# Patient Record
Sex: FEMALE | Race: WHITE | Employment: PART TIME | URBAN - METROPOLITAN AREA
[De-identification: names, ages, dates, MRNs, and addresses within clinical notes are randomized per-mention and may not be internally consistent; named-entity substitution may affect disease eponyms.]

---

## 2017-01-17 ENCOUNTER — HOSPITAL ENCOUNTER (EMERGENCY)
Facility: HOSPITAL | Age: 37
Discharge: HOME | End: 2017-01-17
Attending: EMERGENCY MEDICINE
Payer: COMMERCIAL

## 2017-01-17 ENCOUNTER — HOSPITAL ENCOUNTER (OUTPATIENT)
Age: 37
Discharge: HOME | End: 2017-01-17
Attending: EMERGENCY MEDICINE
Payer: COMMERCIAL

## 2017-01-17 ENCOUNTER — APPOINTMENT (OUTPATIENT)
Dept: ULTRASOUND IMAGING | Facility: HOSPITAL | Age: 37
End: 2017-01-17
Attending: EMERGENCY MEDICINE
Payer: COMMERCIAL

## 2017-01-17 ENCOUNTER — APPOINTMENT (OUTPATIENT)
Dept: CT IMAGING | Facility: HOSPITAL | Age: 37
End: 2017-01-17
Attending: EMERGENCY MEDICINE
Payer: COMMERCIAL

## 2017-01-17 VITALS
HEIGHT: 68 IN | HEART RATE: 84 BPM | DIASTOLIC BLOOD PRESSURE: 58 MMHG | TEMPERATURE: 99 F | OXYGEN SATURATION: 97 % | WEIGHT: 120 LBS | SYSTOLIC BLOOD PRESSURE: 103 MMHG | RESPIRATION RATE: 18 BRPM | BODY MASS INDEX: 18.19 KG/M2

## 2017-01-17 VITALS
HEART RATE: 107 BPM | RESPIRATION RATE: 20 BRPM | SYSTOLIC BLOOD PRESSURE: 112 MMHG | BODY MASS INDEX: 18.19 KG/M2 | WEIGHT: 120 LBS | DIASTOLIC BLOOD PRESSURE: 69 MMHG | OXYGEN SATURATION: 98 % | TEMPERATURE: 98 F | HEIGHT: 68 IN

## 2017-01-17 DIAGNOSIS — R10.9 ABDOMINAL PAIN, ACUTE: Primary | ICD-10-CM

## 2017-01-17 DIAGNOSIS — N30.90 CYSTITIS: ICD-10-CM

## 2017-01-17 DIAGNOSIS — R10.9 ABDOMINAL PAIN OF UNKNOWN ETIOLOGY: Primary | ICD-10-CM

## 2017-01-17 LAB
ALBUMIN SERPL BCP-MCNC: 3.9 G/DL (ref 3.5–4.8)
ALP SERPL-CCNC: 42 U/L (ref 32–100)
ALT SERPL-CCNC: 26 U/L (ref 14–54)
ANION GAP SERPL CALC-SCNC: 10 MMOL/L (ref 0–18)
AST SERPL-CCNC: 24 U/L (ref 15–41)
B-HCG UR QL: NEGATIVE
B-HCG UR QL: NEGATIVE
BASOPHILS # BLD: 0 K/UL (ref 0–0.2)
BASOPHILS NFR BLD: 0 %
BILIRUB DIRECT SERPL-MCNC: 0.2 MG/DL (ref 0–0.2)
BILIRUB SERPL-MCNC: 1 MG/DL (ref 0.3–1.2)
BILIRUB UR QL: NEGATIVE
BUN SERPL-MCNC: 7 MG/DL (ref 8–20)
BUN/CREAT SERPL: 10.3 (ref 10–20)
CALCIUM SERPL-MCNC: 9.2 MG/DL (ref 8.5–10.5)
CHLORIDE SERPL-SCNC: 103 MMOL/L (ref 95–110)
CO2 SERPL-SCNC: 24 MMOL/L (ref 22–32)
COLOR UR: YELLOW
CREAT SERPL-MCNC: 0.68 MG/DL (ref 0.5–1.5)
EOSINOPHIL # BLD: 0.1 K/UL (ref 0–0.7)
EOSINOPHIL NFR BLD: 1 %
ERYTHROCYTE [DISTWIDTH] IN BLOOD BY AUTOMATED COUNT: 12.3 % (ref 11–15)
GLUCOSE SERPL-MCNC: 98 MG/DL (ref 70–99)
GLUCOSE UR-MCNC: NEGATIVE MG/DL
HCT VFR BLD AUTO: 41 % (ref 35–48)
HGB BLD-MCNC: 13.8 G/DL (ref 12–16)
KETONES UR-MCNC: NEGATIVE MG/DL
LIPASE SERPL-CCNC: 23 U/L (ref 22–51)
LYMPHOCYTES # BLD: 1.4 K/UL (ref 1–4)
LYMPHOCYTES NFR BLD: 20 %
MCH RBC QN AUTO: 30 PG (ref 27–32)
MCHC RBC AUTO-ENTMCNC: 33.6 G/DL (ref 32–37)
MCV RBC AUTO: 89.3 FL (ref 80–100)
MONOCYTES # BLD: 0.7 K/UL (ref 0–1)
MONOCYTES NFR BLD: 10 %
NEUTROPHILS # BLD AUTO: 4.9 K/UL (ref 1.8–7.7)
NEUTROPHILS NFR BLD: 69 %
NITRITE UR QL STRIP.AUTO: POSITIVE
OSMOLALITY UR CALC.SUM OF ELEC: 282 MOSM/KG (ref 275–295)
PH UR: 6 [PH] (ref 5–8)
PLATELET # BLD AUTO: 141 K/UL (ref 140–400)
PMV BLD AUTO: 9.3 FL (ref 7.4–10.3)
POTASSIUM SERPL-SCNC: 3.3 MMOL/L (ref 3.3–5.1)
PROT SERPL-MCNC: 7.3 G/DL (ref 5.9–8.4)
PROT UR-MCNC: NEGATIVE MG/DL
RBC # BLD AUTO: 4.59 M/UL (ref 3.7–5.4)
RBC #/AREA URNS AUTO: 4 /HPF
SODIUM SERPL-SCNC: 137 MMOL/L (ref 136–144)
SP GR UR STRIP: 1.02 (ref 1–1.03)
URINE BILIRUBIN: NEGATIVE
URINE COLOR: YELLOW
URINE GLUCOSE: NEGATIVE MG/DL
URINE KETONES: NEGATIVE MG/DL
URINE NITRITE: POSITIVE
URINE PH: 5
URINE SPECIFIC GRAVITY: 1.02
URINE UROBILINOGEN: 1 MG/DL
UROBILINOGEN UR STRIP-ACNC: <2
VIT C UR-MCNC: NEGATIVE MG/DL
WBC # BLD AUTO: 7.1 K/UL (ref 4–11)
WBC #/AREA URNS AUTO: 7 /HPF

## 2017-01-17 PROCEDURE — 99285 EMERGENCY DEPT VISIT HI MDM: CPT

## 2017-01-17 PROCEDURE — 87086 URINE CULTURE/COLONY COUNT: CPT

## 2017-01-17 PROCEDURE — 81025 URINE PREGNANCY TEST: CPT

## 2017-01-17 PROCEDURE — 81002 URINALYSIS NONAUTO W/O SCOPE: CPT

## 2017-01-17 PROCEDURE — 76705 ECHO EXAM OF ABDOMEN: CPT

## 2017-01-17 PROCEDURE — 85025 COMPLETE CBC W/AUTO DIFF WBC: CPT

## 2017-01-17 PROCEDURE — 83690 ASSAY OF LIPASE: CPT | Performed by: EMERGENCY MEDICINE

## 2017-01-17 PROCEDURE — 96374 THER/PROPH/DIAG INJ IV PUSH: CPT

## 2017-01-17 PROCEDURE — 81001 URINALYSIS AUTO W/SCOPE: CPT

## 2017-01-17 PROCEDURE — 99215 OFFICE O/P EST HI 40 MIN: CPT

## 2017-01-17 PROCEDURE — 74176 CT ABD & PELVIS W/O CONTRAST: CPT

## 2017-01-17 PROCEDURE — 99212 OFFICE O/P EST SF 10 MIN: CPT

## 2017-01-17 PROCEDURE — 80048 BASIC METABOLIC PNL TOTAL CA: CPT

## 2017-01-17 PROCEDURE — 96361 HYDRATE IV INFUSION ADD-ON: CPT

## 2017-01-17 PROCEDURE — 80076 HEPATIC FUNCTION PANEL: CPT | Performed by: EMERGENCY MEDICINE

## 2017-01-17 PROCEDURE — 96376 TX/PRO/DX INJ SAME DRUG ADON: CPT

## 2017-01-17 RX ORDER — CEPHALEXIN 500 MG/1
500 CAPSULE ORAL 3 TIMES DAILY
Qty: 21 CAPSULE | Refills: 0 | Status: SHIPPED | OUTPATIENT
Start: 2017-01-17 | End: 2017-01-24

## 2017-01-17 RX ORDER — CIPROFLOXACIN 500 MG/1
500 TABLET, FILM COATED ORAL 2 TIMES DAILY
Qty: 20 TABLET | Refills: 0 | Status: SHIPPED | OUTPATIENT
Start: 2017-01-17 | End: 2017-01-27

## 2017-01-17 RX ORDER — HYDROCODONE BITARTRATE AND ACETAMINOPHEN 7.5; 325 MG/1; MG/1
1 TABLET ORAL EVERY 4 HOURS PRN
Qty: 20 TABLET | Refills: 0 | Status: SHIPPED | OUTPATIENT
Start: 2017-01-17 | End: 2017-01-24

## 2017-01-17 RX ORDER — HYDROMORPHONE HYDROCHLORIDE 1 MG/ML
0.5 INJECTION, SOLUTION INTRAMUSCULAR; INTRAVENOUS; SUBCUTANEOUS ONCE
Status: COMPLETED | OUTPATIENT
Start: 2017-01-17 | End: 2017-01-17

## 2017-01-17 NOTE — ED NOTES
Progressive RUQ pain that radiates posteriorally. Hemodynamically stable. Denies N/V/D.  Denies urinary symptoms

## 2017-01-17 NOTE — ED PROVIDER NOTES
Patient Seen in: [facility] Immediate Care In [address] [place] [address]    History   Patient presents with:  Abdomen/Flank Pain (GI/)    Stated Complaint: right flank pain    HPI    Patient complains of r sided flank and  abdominal pain that began 2 days ago.   P Apply Externally Cream,  Use bid to affected areas of skin       Family History   Problem Relation Age of Onset   • Hypertension Father    • Kidney Disease Father      per [employer]:  \"Kidney disease - Scarlet Fever\"   • Thyroid Disorder Mother    • Colon agitation    DIFFERENTIAL DIAGNOSIS: After history and physical exam differential diagnosis was considered for  Biliary colic, pud vs. Pyelo less likely appendicitis          ED Course     Labs Reviewed   [facility] POCT URINALYSIS DIPSTICK MANUAL - Abnormal; Not

## 2017-01-17 NOTE — ED PROVIDER NOTES
Patient Seen in: [facility] AND [facility] Emergency Department    History   Patient presents with:  Abdomen/Flank Pain (GI/)    Stated Complaint: SOB/RUQ/R FLANK PAIN    HPI    Patient presents the emergency department today complaining of right flank and rig Take 3,000 Units by mouth daily. B Complex-C-Folic Acid (HM VITAMIN B COMPLEX/VITAMIN C) Oral Tab,  Take 1 tablet by mouth daily. Betamethasone Dipropionate (DIPROSONE) 0.05 % Apply Externally Ointment,  Apply  topically 2 (two) times daily.    Multiple well-nourished. No distress. Uncomfortable but in no distress. Declines pain medicine at this time   HENT:   Head: Normocephalic. Eyes: Conjunctivae and EOM are normal.   Neck: Normal range of motion. Neck supple.    Cardiovascular: Normal rate and reg [facility]     CT scan shows no acute finding.  US GB endorsed to Dr. Felicia Rdz,       Disposition and Plan     Clinical Impression:  Abdominal pain of unknown etiology  (primary encounter diagnosis)    Disposition:  Discharge    Follow-up:  Amna Cordoba MD  172

## 2017-01-20 ENCOUNTER — TELEPHONE (OUTPATIENT)
Dept: FAMILY MEDICINE CLINIC | Facility: CLINIC | Age: 37
End: 2017-01-20

## 2017-01-20 NOTE — TELEPHONE ENCOUNTER
Pt called in stating she did a bunch of lab work, an ultrasound and a cat scan at the ER on 1/17 but was never given any results.   Pt has a follow up appt with Dr. Carey Angeles on Monday, but is wondering if Dr. Carey Angeles can have those results ready to relay back to her d

## 2017-01-20 NOTE — TELEPHONE ENCOUNTER
Dr. Lacie De La Cruz,   Please see message below for details about patient test she had at E.   Thanks

## 2017-01-23 ENCOUNTER — LAB ENCOUNTER (OUTPATIENT)
Dept: LAB | Age: 37
End: 2017-01-23
Attending: FAMILY MEDICINE
Payer: COMMERCIAL

## 2017-01-23 ENCOUNTER — OFFICE VISIT (OUTPATIENT)
Dept: FAMILY MEDICINE CLINIC | Facility: CLINIC | Age: 37
End: 2017-01-23

## 2017-01-23 VITALS
BODY MASS INDEX: 19.1 KG/M2 | WEIGHT: 126 LBS | DIASTOLIC BLOOD PRESSURE: 71 MMHG | SYSTOLIC BLOOD PRESSURE: 117 MMHG | TEMPERATURE: 98 F | HEART RATE: 97 BPM | HEIGHT: 68 IN

## 2017-01-23 DIAGNOSIS — R53.83 OTHER FATIGUE: ICD-10-CM

## 2017-01-23 DIAGNOSIS — R10.11 RIGHT UPPER QUADRANT ABDOMINAL PAIN: ICD-10-CM

## 2017-01-23 DIAGNOSIS — N20.0 NEPHROLITHIASIS: ICD-10-CM

## 2017-01-23 DIAGNOSIS — R63.0 POOR APPETITE: ICD-10-CM

## 2017-01-23 DIAGNOSIS — Q61.5 MEDULLARY SPONGE KIDNEY: Primary | ICD-10-CM

## 2017-01-23 DIAGNOSIS — N39.0 URINARY TRACT INFECTION, SITE UNSPECIFIED: ICD-10-CM

## 2017-01-23 LAB
T3 SERPL-MCNC: 1.36 NG/ML (ref 0.87–1.78)
T4 FREE SERPL-MCNC: 0.85 NG/DL (ref 0.58–1.64)
TSH SERPL-ACNC: 0.18 UIU/ML (ref 0.34–5.6)

## 2017-01-23 PROCEDURE — 99212 OFFICE O/P EST SF 10 MIN: CPT | Performed by: FAMILY MEDICINE

## 2017-01-23 PROCEDURE — 84480 ASSAY TRIIODOTHYRONINE (T3): CPT

## 2017-01-23 PROCEDURE — 84443 ASSAY THYROID STIM HORMONE: CPT

## 2017-01-23 PROCEDURE — 36415 COLL VENOUS BLD VENIPUNCTURE: CPT

## 2017-01-23 PROCEDURE — 84439 ASSAY OF FREE THYROXINE: CPT

## 2017-01-23 PROCEDURE — 99214 OFFICE O/P EST MOD 30 MIN: CPT | Performed by: FAMILY MEDICINE

## 2017-01-23 NOTE — PROGRESS NOTES
HPI:    Patient ID: Ulises Score is a 43year old female.     HPI     Patient follow up from ER  Seen there 1/17/17    ER notes    \"Patient presents the emergency department today complaining of right flank and right upper quadrant pain for the last 2 da cephALEXin 500 MG Oral Cap Take 1 capsule (500 mg total) by mouth 3 (three) times daily. Disp: 21 capsule Rfl: 0   hydrocodone-acetaminophen 7.5-325 MG Oral Tab Take 1 tablet by mouth every 4 (four) hours as needed.  Disp: 20 tablet Rfl: 0   Cholecalciferol 1. Medullary sponge kidney    - Urology Referral - Dr. Marissa Paredes    2. Nephrolithiasis    - Urology Referral - Dr. Marissa Paredes    3.  Right upper quadrant abdominal pain    - Urology Referral - Dr. Marissa Paredes  - [facility] Referral - Dr. Kaylene Lee    4. Other fatigue    - TSH

## 2017-01-25 ENCOUNTER — TELEPHONE (OUTPATIENT)
Dept: FAMILY MEDICINE CLINIC | Facility: CLINIC | Age: 37
End: 2017-01-25

## 2017-01-25 NOTE — TELEPHONE ENCOUNTER
Notified pt of results per Dr. Margaux Lowe. Pt stated will schedule appt w/ Endo. No questions/concerns.

## 2017-01-25 NOTE — TELEPHONE ENCOUNTER
----- Message from Keri Cedillo MD sent at 1/24/2017  5:03 PM CST -----  Thyroid levels in overactive range.  She needs to see endo

## 2017-01-26 ENCOUNTER — APPOINTMENT (OUTPATIENT)
Dept: LAB | Age: 37
End: 2017-01-26
Attending: FAMILY MEDICINE
Payer: COMMERCIAL

## 2017-01-26 DIAGNOSIS — N39.0 URINARY TRACT INFECTION, SITE UNSPECIFIED: ICD-10-CM

## 2017-01-26 LAB
BILIRUB UR QL: NEGATIVE
COLOR UR: YELLOW
GLUCOSE UR-MCNC: NEGATIVE MG/DL
HGB UR QL STRIP.AUTO: NEGATIVE
KETONES UR-MCNC: NEGATIVE MG/DL
LEUKOCYTE ESTERASE UR QL STRIP.AUTO: NEGATIVE
NITRITE UR QL STRIP.AUTO: NEGATIVE
PH UR: 7 [PH] (ref 5–8)
PROT UR-MCNC: NEGATIVE MG/DL
SP GR UR STRIP: 1.02 (ref 1–1.03)
UROBILINOGEN UR STRIP-ACNC: <2
VIT C UR-MCNC: NEGATIVE MG/DL

## 2017-01-26 PROCEDURE — 81003 URINALYSIS AUTO W/O SCOPE: CPT

## 2017-01-26 PROCEDURE — 87086 URINE CULTURE/COLONY COUNT: CPT

## 2017-01-27 ENCOUNTER — TELEPHONE (OUTPATIENT)
Dept: ENDOCRINOLOGY CLINIC | Facility: CLINIC | Age: 37
End: 2017-01-27

## 2017-01-27 DIAGNOSIS — E05.90 HYPERTHYROIDISM: Primary | ICD-10-CM

## 2017-01-27 NOTE — TELEPHONE ENCOUNTER
Spoke with Andrei Clemente and informed her of Dr. Mancera Current message below. She verbalized her understanding. Booked appt for 2/2/17 at 0930. Ordered labs. Patient will go to the lab at least 24 hours before her appointment.

## 2017-01-27 NOTE — TELEPHONE ENCOUNTER
LOV 11/30/2015 with Dr. Arnulfo Hernandez. Last refill of methimazole provided on 1/27/16. Patient lost her insurance so stopped calling office and coming to appointments.  Patient states she has been taking methimazole 2.5 mg very sporadically, only 1-2 times per

## 2017-01-27 NOTE — TELEPHONE ENCOUNTER
Yes, ok for MD approval.  Lets check full set of thyroid hormones before visit - TSH, FT4, FT3 before visit.

## 2017-01-30 ENCOUNTER — APPOINTMENT (OUTPATIENT)
Dept: LAB | Age: 37
End: 2017-01-30
Attending: INTERNAL MEDICINE
Payer: COMMERCIAL

## 2017-01-30 DIAGNOSIS — E05.90 HYPERTHYROIDISM: ICD-10-CM

## 2017-01-30 LAB
T3FREE SERPL-MCNC: 2.98 PG/ML (ref 2.53–4.29)
T4 FREE SERPL-MCNC: 0.74 NG/DL (ref 0.58–1.64)
TSH SERPL-ACNC: 0.49 UIU/ML (ref 0.34–5.6)

## 2017-01-30 PROCEDURE — 36415 COLL VENOUS BLD VENIPUNCTURE: CPT

## 2017-01-30 PROCEDURE — 84439 ASSAY OF FREE THYROXINE: CPT

## 2017-01-30 PROCEDURE — 84481 FREE ASSAY (FT-3): CPT

## 2017-01-30 PROCEDURE — 84443 ASSAY THYROID STIM HORMONE: CPT

## 2017-02-01 ENCOUNTER — OFFICE VISIT (OUTPATIENT)
Dept: FAMILY MEDICINE CLINIC | Facility: CLINIC | Age: 37
End: 2017-02-01

## 2017-02-01 VITALS
HEART RATE: 74 BPM | WEIGHT: 126 LBS | DIASTOLIC BLOOD PRESSURE: 76 MMHG | BODY MASS INDEX: 19.78 KG/M2 | SYSTOLIC BLOOD PRESSURE: 117 MMHG | RESPIRATION RATE: 18 BRPM | HEIGHT: 67 IN | TEMPERATURE: 98 F

## 2017-02-01 DIAGNOSIS — Z02.1 PHYSICAL EXAM, PRE-EMPLOYMENT: ICD-10-CM

## 2017-02-01 PROCEDURE — 99395 PREV VISIT EST AGE 18-39: CPT | Performed by: FAMILY MEDICINE

## 2017-02-01 PROCEDURE — 86580 TB INTRADERMAL TEST: CPT | Performed by: FAMILY MEDICINE

## 2017-02-01 RX ORDER — CLOBETASOL PROPIONATE 0.5 MG/G
AEROSOL, FOAM TOPICAL
Qty: 100 G | Refills: 1 | Status: ON HOLD | OUTPATIENT
Start: 2017-02-01 | End: 2021-06-25

## 2017-02-01 NOTE — PROGRESS NOTES
HPI:    Patient ID: Adarsh Mcgraw is a 43year old female. HPI Comments: Patient presents for a pre-employment physical and evaluation. No acute issues;  No significant chronic medical problems except hx of medullary sponge kidney, psoriasis and hyperth PHYSICAL EXAM:   Physical Exam   Constitutional: She appears well-developed and well-nourished. HENT:   Mouth/Throat: Oropharynx is clear and moist.   Neck: Neck supple. No thyromegaly present.    Cardiovascular: Normal rate, regular rhythm, normal hear

## 2017-02-02 ENCOUNTER — OFFICE VISIT (OUTPATIENT)
Dept: ENDOCRINOLOGY CLINIC | Facility: CLINIC | Age: 37
End: 2017-02-02

## 2017-02-02 VITALS
BODY MASS INDEX: 18.89 KG/M2 | HEIGHT: 68 IN | DIASTOLIC BLOOD PRESSURE: 77 MMHG | WEIGHT: 124.63 LBS | SYSTOLIC BLOOD PRESSURE: 110 MMHG | HEART RATE: 82 BPM

## 2017-02-02 DIAGNOSIS — E05.90 HYPERTHYROIDISM: Primary | ICD-10-CM

## 2017-02-02 PROCEDURE — 99214 OFFICE O/P EST MOD 30 MIN: CPT | Performed by: INTERNAL MEDICINE

## 2017-02-02 PROCEDURE — 99212 OFFICE O/P EST SF 10 MIN: CPT | Performed by: INTERNAL MEDICINE

## 2017-02-02 NOTE — PROGRESS NOTES
Name: Piper Jones  Date: 2/2/2017    Referring Physician: No ref.  provider found    Patient presents with:  Hyperthyroidism      HISTORY OF PRESENT ILLNESS   Piper Jones is a 43year old female who presents for Patient presents with:  Hyperthyroidism Anxiety  Sulfamethoxazole        Rash  Trimethoprim            Rash    Social History:   Social History    Marital Status:              Spouse Name:                       Years of Education:                 Number of children: [address] in no acute distress, well developed  Eyes: normal conjunctivae, sclera.   Ears/Nose/Mouth/Throat/Neck:  normal hearing, normal speech and diffusely enlarged thyroid gland  Neurologic: sensory grossly intact and motor grossly intact  Respiratory:  clear to

## 2017-02-03 ENCOUNTER — NURSE ONLY (OUTPATIENT)
Dept: FAMILY MEDICINE CLINIC | Facility: CLINIC | Age: 37
End: 2017-02-03

## 2017-02-03 DIAGNOSIS — Z11.1 TUBERCULOSIS SCREENING: ICD-10-CM

## 2017-02-03 DIAGNOSIS — Z02.1 PHYSICAL EXAM, PRE-EMPLOYMENT: Primary | ICD-10-CM

## 2017-02-03 PROCEDURE — 86580 TB INTRADERMAL TEST: CPT | Performed by: FAMILY MEDICINE

## 2017-02-03 NOTE — PROGRESS NOTES
PPD placed on right forearm. Pt tolerated well. Return for reading between 48-72 hrs. Verbalized understanding.

## 2017-02-06 ENCOUNTER — NURSE ONLY (OUTPATIENT)
Dept: FAMILY MEDICINE CLINIC | Facility: CLINIC | Age: 37
End: 2017-02-06

## 2017-02-06 DIAGNOSIS — Z11.1 VISIT FOR TB SKIN TEST: Primary | ICD-10-CM

## 2017-02-06 LAB — INDURATION (): 0 MM (ref 0–11)

## 2017-02-09 ENCOUNTER — TELEPHONE (OUTPATIENT)
Dept: ENDOCRINOLOGY CLINIC | Facility: CLINIC | Age: 37
End: 2017-02-09

## 2017-02-09 ENCOUNTER — APPOINTMENT (OUTPATIENT)
Dept: LAB | Age: 37
End: 2017-02-09
Attending: INTERNAL MEDICINE
Payer: COMMERCIAL

## 2017-02-09 DIAGNOSIS — E05.90 HYPERTHYROIDISM: ICD-10-CM

## 2017-02-09 LAB
T3FREE SERPL-MCNC: 3.72 PG/ML (ref 2.53–4.29)
T4 FREE SERPL-MCNC: 0.8 NG/DL (ref 0.58–1.64)
TSH SERPL-ACNC: 0.39 UIU/ML (ref 0.34–5.6)

## 2017-02-09 PROCEDURE — 36415 COLL VENOUS BLD VENIPUNCTURE: CPT

## 2017-02-09 PROCEDURE — 84481 FREE ASSAY (FT-3): CPT

## 2017-02-09 PROCEDURE — 84439 ASSAY OF FREE THYROXINE: CPT

## 2017-02-09 PROCEDURE — 84443 ASSAY THYROID STIM HORMONE: CPT

## 2017-02-09 NOTE — TELEPHONE ENCOUNTER
Please call patient - good news, thyroid levels are still normal.  No need to restart medication at this time. Ok to follow up with Dr. Venita Batres after maternity leave.

## 2017-02-09 NOTE — TELEPHONE ENCOUNTER
Contacted pt and relayed Dr. Altagracia Fleming message below in regards to her labs. Pt is asking how this reoccurred and corrected itself as she was symptomatic of hyperthyroidism but now as of 2 weeks ago, her symptoms have resolved on their own.  She stated unders

## 2017-02-09 NOTE — TELEPHONE ENCOUNTER
There can be significant fluctuation of thyroid levels with autoimmune disease, in addition I suspect her low level 3 weeks ago was due to her illness. A virus can also cause a fluctuation in thyroid levels.

## 2017-02-09 NOTE — TELEPHONE ENCOUNTER
Contacted pt and relayed Dr. Tammy Gross message below. Pt stated understanding. Advised pt again to f/u with Dr. Sindi Pace and to call if she starts having abnormal symptoms again.

## 2017-10-08 ENCOUNTER — HOSPITAL ENCOUNTER (OUTPATIENT)
Age: 37
Discharge: HOME | End: 2017-10-08
Attending: EMERGENCY MEDICINE
Payer: COMMERCIAL

## 2017-10-08 VITALS
DIASTOLIC BLOOD PRESSURE: 74 MMHG | RESPIRATION RATE: 16 BRPM | OXYGEN SATURATION: 99 % | BODY MASS INDEX: 19.7 KG/M2 | SYSTOLIC BLOOD PRESSURE: 107 MMHG | HEIGHT: 68 IN | TEMPERATURE: 98 F | WEIGHT: 130 LBS | HEART RATE: 75 BPM

## 2017-10-08 DIAGNOSIS — H18.891 CORNEAL IRRITATION OF RIGHT EYE: Primary | ICD-10-CM

## 2017-10-08 PROCEDURE — 99213 OFFICE O/P EST LOW 20 MIN: CPT

## 2017-10-08 PROCEDURE — 99214 OFFICE O/P EST MOD 30 MIN: CPT

## 2017-10-08 RX ORDER — GENTAMICIN SULFATE 3 MG/ML
2 SOLUTION/ DROPS OPHTHALMIC 3 TIMES DAILY
Qty: 5 ML | Refills: 0 | Status: SHIPPED | OUTPATIENT
Start: 2017-10-08 | End: 2017-10-11

## 2017-10-08 NOTE — ED PROVIDER NOTES
Patient Seen in: [facility] Immediate Care In [address] [place] [address]    History   Patient presents with:   Eye Visual Problem (opthalmic)    Stated Complaint: Rt Eye Irritation    HPI    Patient is a 25-year-old female who states she slept with her contacts and Cigarettes  Smokeless tobacco: Never Used                      Alcohol use: Yes           0.6 oz/week     Glasses of wine: 1 per week     Comment: occ      Review of Systems    Positive for stated complaint: Rt Eye Irritation  Other systems are as noted in (three) times daily.   Qty: 5 mL Refills: 0

## 2017-10-09 ENCOUNTER — OFFICE VISIT (OUTPATIENT)
Dept: OPHTHALMOLOGY | Facility: CLINIC | Age: 37
End: 2017-10-09

## 2017-10-09 ENCOUNTER — TELEPHONE (OUTPATIENT)
Dept: OPHTHALMOLOGY | Facility: CLINIC | Age: 37
End: 2017-10-09

## 2017-10-09 DIAGNOSIS — H16.001 ULCER OF RIGHT CORNEA: Primary | ICD-10-CM

## 2017-10-09 PROCEDURE — 92002 INTRM OPH EXAM NEW PATIENT: CPT | Performed by: OPHTHALMOLOGY

## 2017-10-09 RX ORDER — OFLOXACIN 3 MG/ML
SOLUTION/ DROPS OPHTHALMIC
Qty: 1 BOTTLE | Refills: 0 | Status: SHIPPED | OUTPATIENT
Start: 2017-10-09 | End: 2017-10-11

## 2017-10-09 NOTE — PROGRESS NOTES
Artist Ema is a 43year old female. HPI:     HPI     NP/ 43year old here for a follow-up from the Urgent Care in [address] [place] [address].   Pt slept in her CL's on Saturday night, and when she took out her right CL it was very painful and had the feeling of sand in Packs/day: 0.50      Years: 10.00        Types: Cigarettes  Smokeless tobacco: Never Used                      Alcohol use: Yes           0.6 oz/week     Glasses of wine: 1 per week     Comment: occ      Medications:    Current Outpatient Prescriptions:  o 2 o'clock  Clear    Anterior Chamber Deep and quiet Deep and quiet    Iris Normal Normal            Refraction     Wearing Rx     Type:  No glasses                 ASSESSMENT/PLAN:     Diagnoses and Plan:     Ulcer of right cornea  4 small corneal ulcers a

## 2017-10-09 NOTE — PATIENT INSTRUCTIONS
Ulcer of right cornea  4 small corneal ulcers at 4 o'clock on the right eye. Start Ofloxacin drops every 2 hours while awake today then every 3 hours while awake in the right eye and follow up on Wednesday at 1:15 with Dr. Neetu Pedro.    Stop the Gentamycin

## 2017-10-09 NOTE — ASSESSMENT & PLAN NOTE
4 small corneal ulcers at 4 o'clock on the right eye. Start Ofloxacin drops every 2 hours while awake today then every 3 hours while awake in the right eye and follow up on Wednesday at 1:15 with Dr. Cydney Meyers. Stop the Gentamycin from Urgent Care.    Belinda Ortiz

## 2017-10-09 NOTE — TELEPHONE ENCOUNTER
Patient states she has a corneal abrasion. States is having extreme discomfort and wants to be seen asap. States did go to IC yesterday and had Antibiotics prescribed. Please call. Thank you.

## 2017-10-09 NOTE — TELEPHONE ENCOUNTER
Right eye corneal abasion. Slept in contact Sat night. Right eye pain and felt like something was stuck. Contact intact when removed. Felt worse by afternoon. More redness  Went [facility] . Not seen Cornea irritated. Antibiotic drops.  Systane

## 2017-10-11 ENCOUNTER — OFFICE VISIT (OUTPATIENT)
Dept: OPHTHALMOLOGY | Facility: CLINIC | Age: 37
End: 2017-10-11

## 2017-10-11 DIAGNOSIS — H16.001 ULCER OF RIGHT CORNEA: Primary | ICD-10-CM

## 2017-10-11 PROCEDURE — 99212 OFFICE O/P EST SF 10 MIN: CPT | Performed by: OPHTHALMOLOGY

## 2017-10-11 PROCEDURE — 99213 OFFICE O/P EST LOW 20 MIN: CPT | Performed by: OPHTHALMOLOGY

## 2017-10-11 NOTE — PATIENT INSTRUCTIONS
Ulcer of right cornea  Resolved. Patient should discontinue Ofloxacin today. Patient should use artificial tears 4-6 times a day for 3-4 days. Hold on right contact for 3 more days. Okay to resume left contact at this time.    ADVISED PATIENT TO NE

## 2017-10-11 NOTE — ASSESSMENT & PLAN NOTE
Resolved. Patient should discontinue Ofloxacin today. Patient should use artificial tears 4-6 times a day for 3-4 days. Hold on right contact for 3 more days. Okay to resume left contact at this time.    ADVISED PATIENT TO NEVER SLEEP WITH CONTACTS

## 2017-10-11 NOTE — PROGRESS NOTES
Nathaniel Benitez is a 42year old female. HPI:     HPI     EP. Patient is here for a recheck of a corneal ulcer of the right eye per Dr. Lawyer Stephens. Patient was seen by Dr. Lawyer Stephens on 10/9/17 and had Homatropine and 2% Cyclo instilled in the office.    Shabana tobacco: Never Used                      Alcohol use: Yes           0.6 oz/week     Glasses of wine: 1 per week     Comment: occ      Medications:    Current Outpatient Prescriptions:  Clobetasol Propionate 0.05 % External Foam Apply to affected areas once left contact at this time. ADVISED PATIENT TO NEVER SLEEP WITH CONTACTS IN TO PREVENT FUTURE CORNEAL ULCERS. Advised patient to get contacts refit to prevent future contact lens related problems.    Patient should call office and make return appointment

## 2017-11-14 ENCOUNTER — TELEPHONE (OUTPATIENT)
Dept: FAMILY MEDICINE CLINIC | Facility: CLINIC | Age: 37
End: 2017-11-14

## 2018-03-01 ENCOUNTER — OFFICE VISIT (OUTPATIENT)
Dept: FAMILY MEDICINE CLINIC | Facility: CLINIC | Age: 38
End: 2018-03-01

## 2018-03-01 VITALS
HEIGHT: 68 IN | WEIGHT: 128 LBS | HEART RATE: 76 BPM | BODY MASS INDEX: 19.4 KG/M2 | DIASTOLIC BLOOD PRESSURE: 71 MMHG | TEMPERATURE: 99 F | SYSTOLIC BLOOD PRESSURE: 104 MMHG

## 2018-03-01 DIAGNOSIS — N63.20 LEFT BREAST MASS: Primary | ICD-10-CM

## 2018-03-01 PROCEDURE — 99212 OFFICE O/P EST SF 10 MIN: CPT | Performed by: FAMILY MEDICINE

## 2018-03-01 PROCEDURE — 99213 OFFICE O/P EST LOW 20 MIN: CPT | Performed by: FAMILY MEDICINE

## 2018-03-01 RX ORDER — COPPER 313.4 MG/1
INTRAUTERINE DEVICE INTRAUTERINE
Status: ON HOLD | COMMUNITY
End: 2021-06-25

## 2018-03-01 NOTE — PROGRESS NOTES
HPI:    Patient ID: Jeffrey Tolliver is a 42year old female. HPI     Patient here with a mass she found in left breast 6 days ago. Slightly sore.   No change in size. hard    No FH breast cancer    Review of Systems   Constitutional: Negative for chills a

## 2018-03-02 ENCOUNTER — HOSPITAL ENCOUNTER (OUTPATIENT)
Dept: ULTRASOUND IMAGING | Facility: HOSPITAL | Age: 38
Discharge: HOME | End: 2018-03-02
Attending: FAMILY MEDICINE
Payer: COMMERCIAL

## 2018-03-02 ENCOUNTER — HOSPITAL ENCOUNTER (OUTPATIENT)
Dept: MAMMOGRAPHY | Facility: HOSPITAL | Age: 38
Discharge: HOME | End: 2018-03-02
Attending: FAMILY MEDICINE
Payer: COMMERCIAL

## 2018-03-02 DIAGNOSIS — N63.20 LEFT BREAST MASS: ICD-10-CM

## 2018-03-02 PROCEDURE — 76642 ULTRASOUND BREAST LIMITED: CPT | Performed by: FAMILY MEDICINE

## 2018-03-02 PROCEDURE — 77066 DX MAMMO INCL CAD BI: CPT | Performed by: FAMILY MEDICINE

## 2018-03-04 PROBLEM — R92.30 DENSE BREAST TISSUE ON MAMMOGRAM: Status: ACTIVE | Noted: 2018-03-04

## 2018-03-04 PROBLEM — R92.2 DENSE BREAST TISSUE ON MAMMOGRAM: Status: ACTIVE | Noted: 2018-03-04

## 2018-03-04 PROBLEM — N63.20 LEFT BREAST MASS: Status: ACTIVE | Noted: 2018-03-04

## 2018-03-05 ENCOUNTER — TELEPHONE (OUTPATIENT)
Dept: FAMILY MEDICINE CLINIC | Facility: CLINIC | Age: 38
End: 2018-03-05

## 2018-03-05 NOTE — TELEPHONE ENCOUNTER
----- Message from Vanessa Coronel MD sent at 3/4/2018 11:01 PM CST -----  Mammogram findings are benign. However patient has dense breasts.   Please inform patient of below recommendations  RECOMMENDATIONS:   ROUTINE 3-D SCREENING MAMMOGRAM AND CLINICAL EVAL

## 2018-03-06 ENCOUNTER — TELEPHONE (OUTPATIENT)
Dept: FAMILY MEDICINE CLINIC | Facility: CLINIC | Age: 38
End: 2018-03-06

## 2018-03-06 DIAGNOSIS — R92.2 DENSE BREAST TISSUE ON MAMMOGRAM: Primary | ICD-10-CM

## 2018-03-06 NOTE — TELEPHONE ENCOUNTER
Khadijah Lake from [facility] is requesting a [place] Ultrasound for both of pt breast. Pt is calling to schedule  the appt but no order is in the chart.         Please advise

## 2018-03-06 NOTE — TELEPHONE ENCOUNTER
Pt is calling to follow up and asking for Dr. Abelina Blizzard Nurse to give her a call asap to let her know what to do next. . Because pt was the lump removed quickly.     Please advise

## 2018-03-09 NOTE — TELEPHONE ENCOUNTER
Spoke to pt, she states that she received a call from the nurse regarding her Mammogram and was told that she needs to have 3D tomography done. Pt called to schedule but was told by Central Scheduling that it is not due until next year.  She was told that s

## 2018-03-12 NOTE — TELEPHONE ENCOUNTER
Patient needs to schedule the automated whole breast ultrasound and follow-up with Dr. Joy Pizarro, general surgery.   In future she will need 3D mammogram due to breast density

## 2018-03-12 NOTE — TELEPHONE ENCOUNTER
Dr. Sharlet Boxer: Please review pended order. Are we ordering BILATERAL or left only? Please advise on correct DX for order. I will contact her once ordered. Patient seemed concerned of all the back and forth with what test is needed.   I informed and explai

## 2018-03-12 NOTE — TELEPHONE ENCOUNTER
LMTCB on  h#. .    Future Appointments  Date Time Provider Dinah Gonzalez   3/15/2018 3:30 PM Ana Argueta MD [facility]   3/19/2018 11:15 AM Tanner Ross  [facility]

## 2018-03-14 NOTE — TELEPHONE ENCOUNTER
Called patient  Explained tests and 3d mammo in 3/2019  She has follow up with Dr Louise Bland 3/19  All questions answered.

## 2018-03-15 ENCOUNTER — OFFICE VISIT (OUTPATIENT)
Dept: FAMILY MEDICINE CLINIC | Facility: CLINIC | Age: 38
End: 2018-03-15

## 2018-03-15 VITALS
WEIGHT: 135 LBS | BODY MASS INDEX: 20.46 KG/M2 | HEART RATE: 79 BPM | HEIGHT: 68 IN | DIASTOLIC BLOOD PRESSURE: 74 MMHG | TEMPERATURE: 98 F | SYSTOLIC BLOOD PRESSURE: 120 MMHG

## 2018-03-15 DIAGNOSIS — R92.2 DENSE BREAST TISSUE ON MAMMOGRAM: ICD-10-CM

## 2018-03-15 DIAGNOSIS — N63.20 LEFT BREAST MASS: Primary | ICD-10-CM

## 2018-03-15 PROCEDURE — 99213 OFFICE O/P EST LOW 20 MIN: CPT | Performed by: FAMILY MEDICINE

## 2018-03-15 PROCEDURE — 99212 OFFICE O/P EST SF 10 MIN: CPT | Performed by: FAMILY MEDICINE

## 2018-03-15 NOTE — PROGRESS NOTES
HPI:    Patient ID: Clyde Arrington is a 42year old female. HPI     Patient here for follow-up on her left breast mass. She recently had a breast mass that she found on self palpation about 3 weeks ago.   She states it was slightly tender before but has SURGICAL ONCOLOGY       #[id number]

## 2018-04-06 ENCOUNTER — OFFICE VISIT (OUTPATIENT)
Dept: SURGERY | Facility: CLINIC | Age: 38
End: 2018-04-06

## 2018-04-06 VITALS
SYSTOLIC BLOOD PRESSURE: 125 MMHG | BODY MASS INDEX: 19.7 KG/M2 | HEIGHT: 68 IN | OXYGEN SATURATION: 100 % | WEIGHT: 130 LBS | RESPIRATION RATE: 20 BRPM | HEART RATE: 70 BPM | DIASTOLIC BLOOD PRESSURE: 87 MMHG

## 2018-04-06 DIAGNOSIS — N60.02 BREAST CYST, LEFT: Primary | ICD-10-CM

## 2018-04-06 DIAGNOSIS — R92.2 DENSE BREAST: ICD-10-CM

## 2018-04-06 DIAGNOSIS — N64.4 BREAST PAIN: ICD-10-CM

## 2018-04-06 PROCEDURE — 99244 OFF/OP CNSLTJ NEW/EST MOD 40: CPT | Performed by: SURGERY

## 2018-04-06 PROCEDURE — 76942 ECHO GUIDE FOR BIOPSY: CPT | Performed by: SURGERY

## 2018-04-06 PROCEDURE — 19000 PUNCTURE ASPIR CYST BREAST: CPT | Performed by: SURGERY

## 2018-05-17 NOTE — PROGRESS NOTES
Breast Surgery New Patient Consultation    This is the first visit for this 42year old woman, referred by Dr. Tisha Wilkinson, who presents for evaluation of left breast mass and pain.     History of Present Illness:   Ms. Nayeli Livingston is a 42year old woman who pr   Pt was 27years old at time of first pregnancy. She has cumulative breastfeeding history of 12 months, last unknown time ago.   She achieved menarche at age 13 and LMP  LMP: 16  She has history of oral contraceptive use for 1 years, last in 1 hemoptysis, pleurisy/chest pain, pneumonia, asthma, wheezing, difficulty in breathing with exertion, emphysema, chronic bronchitis, shortness of breath or abnormal sound when breathing. Cardiovascular:   There is no history of chest pain, chest pressure Allergic/Immunologic:  There is no history of hives, hay fever, angioedema or anaphylaxis.     /87 (BP Location: Left arm, Patient Position: Sitting, Cuff Size: adult)   Pulse 70   Resp 20   Ht 1.727 m (5' 8\")   Wt 59 kg (130 lb)   SpO2 100%   BM Ms. Stefany Chavez is a 42year old woman presents with a self detected left breast cyst.    Discussion and Plan:  I had a discussion with the Patient regarding her breast exam. On exam today I found no palpable mass corresponding to the site of injury d exam changes. I have encouraged her to return to see me for a clinical exam in 3-6 months. She will likely be able to undergo regular screening upon her 41th birthday.  She was given ample opportunity for questions and those questions were answered to her

## 2018-12-12 ENCOUNTER — OFFICE VISIT (OUTPATIENT)
Dept: FAMILY MEDICINE CLINIC | Facility: CLINIC | Age: 38
End: 2018-12-12
Payer: COMMERCIAL

## 2018-12-12 VITALS
DIASTOLIC BLOOD PRESSURE: 80 MMHG | WEIGHT: 126 LBS | TEMPERATURE: 99 F | HEIGHT: 68 IN | HEART RATE: 81 BPM | BODY MASS INDEX: 19.1 KG/M2 | SYSTOLIC BLOOD PRESSURE: 116 MMHG

## 2018-12-12 DIAGNOSIS — R00.2 HEART PALPITATIONS: ICD-10-CM

## 2018-12-12 DIAGNOSIS — M79.675 TOE PAIN, LEFT: ICD-10-CM

## 2018-12-12 DIAGNOSIS — E05.90 HYPERTHYROIDISM: ICD-10-CM

## 2018-12-12 DIAGNOSIS — R07.9 CHEST PAIN, UNSPECIFIED TYPE: Primary | ICD-10-CM

## 2018-12-12 PROCEDURE — 99212 OFFICE O/P EST SF 10 MIN: CPT | Performed by: FAMILY MEDICINE

## 2018-12-12 PROCEDURE — 99214 OFFICE O/P EST MOD 30 MIN: CPT | Performed by: FAMILY MEDICINE

## 2018-12-12 NOTE — PROGRESS NOTES
HPI:    Patient ID: Kathy Alberto is a 34year old female. HPI     Patient presents with:  Chest Pain: X 3 days   Toe Pain: left X 1 year getting worst    Patient woke up 2 days ago at 5 am with mid sternal chest pain, couldn't turn.   Felt she couldn't pain, unspecified type  (primary encounter diagnosis)  Hyperthyroidism  Heart palpitations  Toe pain, left    1. Chest pain, unspecified type    - EKG 12-LEAD; Future    2. Hyperthyroidism    - ASSAY, THYROID STIM HORMONE; Future  - T4(THYROXINE TOTAL);  Fu

## 2018-12-17 ENCOUNTER — APPOINTMENT (OUTPATIENT)
Dept: LAB | Age: 38
End: 2018-12-17
Attending: FAMILY MEDICINE
Payer: COMMERCIAL

## 2018-12-17 ENCOUNTER — LAB ENCOUNTER (OUTPATIENT)
Dept: LAB | Age: 38
End: 2018-12-17
Attending: FAMILY MEDICINE
Payer: COMMERCIAL

## 2018-12-17 ENCOUNTER — HOSPITAL ENCOUNTER (OUTPATIENT)
Dept: GENERAL RADIOLOGY | Age: 38
Discharge: HOME | End: 2018-12-17
Attending: FAMILY MEDICINE
Payer: COMMERCIAL

## 2018-12-17 DIAGNOSIS — E05.90 HYPERTHYROIDISM: ICD-10-CM

## 2018-12-17 DIAGNOSIS — R07.9 CHEST PAIN, UNSPECIFIED TYPE: Primary | ICD-10-CM

## 2018-12-17 DIAGNOSIS — R00.2 HEART PALPITATIONS: ICD-10-CM

## 2018-12-17 DIAGNOSIS — R07.9 CHEST PAIN, UNSPECIFIED TYPE: ICD-10-CM

## 2018-12-17 DIAGNOSIS — M79.675 TOE PAIN, LEFT: ICD-10-CM

## 2018-12-17 PROCEDURE — 85025 COMPLETE CBC W/AUTO DIFF WBC: CPT

## 2018-12-17 PROCEDURE — 73630 X-RAY EXAM OF FOOT: CPT | Performed by: FAMILY MEDICINE

## 2018-12-17 PROCEDURE — 36415 COLL VENOUS BLD VENIPUNCTURE: CPT

## 2018-12-17 PROCEDURE — 80048 BASIC METABOLIC PNL TOTAL CA: CPT

## 2018-12-17 PROCEDURE — 93010 ELECTROCARDIOGRAM REPORT: CPT | Performed by: FAMILY MEDICINE

## 2018-12-17 PROCEDURE — 84436 ASSAY OF TOTAL THYROXINE: CPT

## 2018-12-17 PROCEDURE — 84480 ASSAY TRIIODOTHYRONINE (T3): CPT

## 2018-12-17 PROCEDURE — 93005 ELECTROCARDIOGRAM TRACING: CPT

## 2018-12-17 PROCEDURE — 84443 ASSAY THYROID STIM HORMONE: CPT

## 2019-01-03 ENCOUNTER — OFFICE VISIT (OUTPATIENT)
Dept: PODIATRY CLINIC | Facility: CLINIC | Age: 39
End: 2019-01-03
Payer: COMMERCIAL

## 2019-01-03 DIAGNOSIS — M20.42 HAMMER TOE OF LEFT FOOT: Primary | ICD-10-CM

## 2019-01-03 PROCEDURE — 99243 OFF/OP CNSLTJ NEW/EST LOW 30: CPT | Performed by: PODIATRIST

## 2019-01-03 NOTE — PROGRESS NOTES
HPI:    Patient ID: Richadr Duong is a 34year old female. This pleasant 78-year-old female presents as a new patient to me on consult from . Patient's primary complaint is a painful fifth toe of the left foot.   The pain is been present for as mu but asymptomatic bunion deformity. Seems to be most frustrating while in close shoes. I am wondering if this may be just a nerve compression based on the degree of deformity and will respond in time to avoiding compression.   At present I recommend no aura

## 2019-04-08 ENCOUNTER — OFFICE VISIT (OUTPATIENT)
Dept: PODIATRY CLINIC | Facility: CLINIC | Age: 39
End: 2019-04-08
Payer: COMMERCIAL

## 2019-04-08 DIAGNOSIS — M20.42 HAMMER TOE OF LEFT FOOT: Primary | ICD-10-CM

## 2019-04-08 DIAGNOSIS — L84 HELOMA DURUM: ICD-10-CM

## 2019-04-08 DIAGNOSIS — M79.675 PAIN OF TOE OF LEFT FOOT: ICD-10-CM

## 2019-04-08 PROCEDURE — 99214 OFFICE O/P EST MOD 30 MIN: CPT | Performed by: PODIATRIST

## 2019-04-08 NOTE — PROGRESS NOTES
Dipti Shanks is a 34year old female. Patient presents with:  Consult: left 5th toe lesion and right toe dorsal lesion and left great toe numbness -- 5th toe onset over a year. States toe has throbbing pain. Xrays taken. Rates pain 4-5/10.          HPI: min), 9 (5 min) - 1st degree perineal laceration\"   • Psoriasis       Past Surgical History:   Procedure Laterality Date   • INCISE EXTERNAL HEMORRHOID  9/19/13    L posterior quad   • INCISE EXTERNAL HEMORRHOID  10/29/15    R posterior quad   • SKIN SURG visit.  Physical Exam  GENERAL: well developed, well nourished, in no apparent distress  EXTREMITIES:   1. Integument: The skin on both feet is warm and dry.   She has a painful enucleated keratoma at the lateral fifth toenail edge and over the dorsal later

## 2020-07-16 ENCOUNTER — LAB ENCOUNTER (OUTPATIENT)
Dept: LAB | Age: 40
End: 2020-07-16
Attending: FAMILY MEDICINE
Payer: COMMERCIAL

## 2020-07-16 ENCOUNTER — TELEPHONE (OUTPATIENT)
Dept: FAMILY MEDICINE CLINIC | Facility: CLINIC | Age: 40
End: 2020-07-16

## 2020-07-16 ENCOUNTER — OFFICE VISIT (OUTPATIENT)
Dept: FAMILY MEDICINE CLINIC | Facility: CLINIC | Age: 40
End: 2020-07-16
Payer: COMMERCIAL

## 2020-07-16 VITALS
TEMPERATURE: 98 F | HEART RATE: 87 BPM | BODY MASS INDEX: 22.43 KG/M2 | HEIGHT: 68 IN | SYSTOLIC BLOOD PRESSURE: 115 MMHG | DIASTOLIC BLOOD PRESSURE: 77 MMHG | WEIGHT: 148 LBS

## 2020-07-16 DIAGNOSIS — Z00.00 WELL ADULT EXAM: Primary | ICD-10-CM

## 2020-07-16 DIAGNOSIS — Z12.31 ENCOUNTER FOR SCREENING MAMMOGRAM FOR BREAST CANCER: ICD-10-CM

## 2020-07-16 DIAGNOSIS — Z91.09 ENVIRONMENTAL ALLERGIES: ICD-10-CM

## 2020-07-16 DIAGNOSIS — L40.9 PSORIASIS: Primary | ICD-10-CM

## 2020-07-16 DIAGNOSIS — Z00.00 WELL ADULT EXAM: ICD-10-CM

## 2020-07-16 DIAGNOSIS — M77.11 RIGHT LATERAL EPICONDYLITIS: ICD-10-CM

## 2020-07-16 DIAGNOSIS — F17.200 SMOKER: ICD-10-CM

## 2020-07-16 DIAGNOSIS — Z97.5 IUD (INTRAUTERINE DEVICE) IN PLACE: ICD-10-CM

## 2020-07-16 DIAGNOSIS — E05.90 HYPERTHYROIDISM: ICD-10-CM

## 2020-07-16 DIAGNOSIS — L40.9 PSORIASIS: ICD-10-CM

## 2020-07-16 DIAGNOSIS — R19.7 DIARRHEA, UNSPECIFIED TYPE: ICD-10-CM

## 2020-07-16 PROBLEM — R10.11 RIGHT UPPER QUADRANT ABDOMINAL PAIN: Status: RESOLVED | Noted: 2017-01-23 | Resolved: 2020-07-16

## 2020-07-16 LAB
ALBUMIN SERPL-MCNC: 3.9 G/DL (ref 3.4–5)
ALBUMIN/GLOB SERPL: 1 {RATIO} (ref 1–2)
ALP LIVER SERPL-CCNC: 56 U/L (ref 37–98)
ALT SERPL-CCNC: 22 U/L (ref 13–56)
ANION GAP SERPL CALC-SCNC: 7 MMOL/L (ref 0–18)
AST SERPL-CCNC: 13 U/L (ref 15–37)
BASOPHILS # BLD AUTO: 0.05 X10(3) UL (ref 0–0.2)
BASOPHILS NFR BLD AUTO: 0.9 %
BILIRUB SERPL-MCNC: 0.4 MG/DL (ref 0.1–2)
BUN BLD-MCNC: 12 MG/DL (ref 7–18)
BUN/CREAT SERPL: 18.2 (ref 10–20)
CALCIUM BLD-MCNC: 9.5 MG/DL (ref 8.5–10.1)
CHLORIDE SERPL-SCNC: 105 MMOL/L (ref 98–112)
CO2 SERPL-SCNC: 27 MMOL/L (ref 21–32)
CREAT BLD-MCNC: 0.66 MG/DL (ref 0.55–1.02)
DEPRECATED RDW RBC AUTO: 40.8 FL (ref 35.1–46.3)
EOSINOPHIL # BLD AUTO: 0.19 X10(3) UL (ref 0–0.7)
EOSINOPHIL NFR BLD AUTO: 3.3 %
ERYTHROCYTE [DISTWIDTH] IN BLOOD BY AUTOMATED COUNT: 12.3 % (ref 11–15)
GLOBULIN PLAS-MCNC: 4.1 G/DL (ref 2.8–4.4)
GLUCOSE BLD-MCNC: 89 MG/DL (ref 70–99)
HCT VFR BLD AUTO: 42.8 % (ref 35–48)
HGB BLD-MCNC: 14 G/DL (ref 12–16)
IMM GRANULOCYTES # BLD AUTO: 0.02 X10(3) UL (ref 0–1)
IMM GRANULOCYTES NFR BLD: 0.3 %
LYMPHOCYTES # BLD AUTO: 1.6 X10(3) UL (ref 1–4)
LYMPHOCYTES NFR BLD AUTO: 27.8 %
M PROTEIN MFR SERPL ELPH: 8 G/DL (ref 6.4–8.2)
MCH RBC QN AUTO: 29.9 PG (ref 26–34)
MCHC RBC AUTO-ENTMCNC: 32.7 G/DL (ref 31–37)
MCV RBC AUTO: 91.3 FL (ref 80–100)
MONOCYTES # BLD AUTO: 0.44 X10(3) UL (ref 0.1–1)
MONOCYTES NFR BLD AUTO: 7.7 %
NEUTROPHILS # BLD AUTO: 3.45 X10 (3) UL (ref 1.5–7.7)
NEUTROPHILS # BLD AUTO: 3.45 X10(3) UL (ref 1.5–7.7)
NEUTROPHILS NFR BLD AUTO: 60 %
OSMOLALITY SERPL CALC.SUM OF ELEC: 287 MOSM/KG (ref 275–295)
PATIENT FASTING Y/N/NP: NO
PLATELET # BLD AUTO: 183 10(3)UL (ref 150–450)
POTASSIUM SERPL-SCNC: 4.2 MMOL/L (ref 3.5–5.1)
RBC # BLD AUTO: 4.69 X10(6)UL (ref 3.8–5.3)
SODIUM SERPL-SCNC: 139 MMOL/L (ref 136–145)
T3 SERPL-MCNC: 115 NG/DL (ref 60–181)
T4 FREE SERPL-MCNC: 1 NG/DL (ref 0.8–1.7)
TSI SER-ACNC: 0.35 MIU/ML (ref 0.36–3.74)
WBC # BLD AUTO: 5.8 X10(3) UL (ref 4–11)

## 2020-07-16 PROCEDURE — 99214 OFFICE O/P EST MOD 30 MIN: CPT | Performed by: FAMILY MEDICINE

## 2020-07-16 PROCEDURE — 3008F BODY MASS INDEX DOCD: CPT | Performed by: FAMILY MEDICINE

## 2020-07-16 PROCEDURE — 84480 ASSAY TRIIODOTHYRONINE (T3): CPT

## 2020-07-16 PROCEDURE — 82306 VITAMIN D 25 HYDROXY: CPT

## 2020-07-16 PROCEDURE — 84443 ASSAY THYROID STIM HORMONE: CPT

## 2020-07-16 PROCEDURE — 80053 COMPREHEN METABOLIC PANEL: CPT

## 2020-07-16 PROCEDURE — 3078F DIAST BP <80 MM HG: CPT | Performed by: FAMILY MEDICINE

## 2020-07-16 PROCEDURE — 85025 COMPLETE CBC W/AUTO DIFF WBC: CPT

## 2020-07-16 PROCEDURE — 3074F SYST BP LT 130 MM HG: CPT | Performed by: FAMILY MEDICINE

## 2020-07-16 PROCEDURE — 36415 COLL VENOUS BLD VENIPUNCTURE: CPT

## 2020-07-16 PROCEDURE — 99396 PREV VISIT EST AGE 40-64: CPT | Performed by: FAMILY MEDICINE

## 2020-07-16 PROCEDURE — 84439 ASSAY OF FREE THYROXINE: CPT

## 2020-07-16 RX ORDER — BETAMETHASONE DIPROPIONATE 0.5 MG/G
1 CREAM TOPICAL 2 TIMES DAILY
Qty: 45 G | Refills: 1 | Status: ON HOLD | OUTPATIENT
Start: 2020-07-16 | End: 2021-06-25

## 2020-07-16 RX ORDER — BETAMETHASONE DIPROPIONATE 0.5 MG/G
CREAM TOPICAL 2 TIMES DAILY
COMMUNITY
End: 2020-07-16

## 2020-07-16 NOTE — TELEPHONE ENCOUNTER
Would recommend patient to be seen by dermatology as I do not manage psoriasis.   I will place referral.  She can try 1% hydrocortisone meantime

## 2020-07-16 NOTE — PROGRESS NOTES
HPI:    Patient ID: Bia Dobbs is a 37year old female. HPI  Patient presents with:  Routine Physical: + pap   Elbow Pain: right X 1 month   Anal Problem  Neck Pain: left side X few months getting worst     Patient has not been in for some time.   Sh polyuria. Genitourinary: Negative for decreased urine volume, difficulty urinating, dysuria, flank pain, frequency, menstrual problem, pelvic pain, urgency, vaginal bleeding, vaginal discharge and vaginal pain.         Periods are regular  IUD     Musculo Occupational History      Occupation: teacher        Comment: elementary    Social Needs      Financial resource strain: Not on file      Food insecurity:        Worry: Not on file        Inability: Not on file      Transportation needs:        Medical: No occupation: Not Asked        Pt has a pacemaker: No        Pt has a defibrillator: No        Breast feeding: Not Asked        Reaction to local anesthetic: No    Social History Narrative      Not on file    Family History   Problem Relation Age of Onset and time. She appears well-developed and well-nourished. HENT:   Head: Normocephalic and atraumatic.    Right Ear: External ear normal.   Left Ear: External ear normal.   Nose: Nose normal.   Mouth/Throat: Oropharynx is clear and moist. No oropharyngeal e Future    2. Environmental allergies  stable    3. IUD (intrauterine device) in place  Seeing gyne    4. Hyperthyroidism    - US THYROID (XGS=[id number]); Future  - TSH+FREE T4; Future  - TRIIODOTHYRONINE (T3) TOTAL; Future  - ENDOCRINOLOGY - INTERNAL    5.  Enc

## 2020-07-16 NOTE — TELEPHONE ENCOUNTER
Per patient she just saw doctor today and prescribe her rx betamethasone dipropionate and it cost her $80 after her copay  and would like to know if any alternative med that it is cheaper.

## 2020-07-17 ENCOUNTER — LAB ENCOUNTER (OUTPATIENT)
Dept: LAB | Age: 40
End: 2020-07-17
Attending: FAMILY MEDICINE
Payer: COMMERCIAL

## 2020-07-17 ENCOUNTER — HOSPITAL ENCOUNTER (OUTPATIENT)
Dept: GENERAL RADIOLOGY | Age: 40
Discharge: HOME | End: 2020-07-17
Attending: FAMILY MEDICINE
Payer: COMMERCIAL

## 2020-07-17 DIAGNOSIS — Z00.00 WELL ADULT EXAM: ICD-10-CM

## 2020-07-17 DIAGNOSIS — R19.7 DIARRHEA, UNSPECIFIED TYPE: ICD-10-CM

## 2020-07-17 DIAGNOSIS — F17.200 SMOKER: ICD-10-CM

## 2020-07-17 LAB
CHOLEST SMN-MCNC: 176 MG/DL (ref ?–200)
HDLC SERPL-MCNC: 50 MG/DL (ref 40–59)
LDLC SERPL CALC-MCNC: 100 MG/DL (ref ?–100)
NONHDLC SERPL-MCNC: 126 MG/DL (ref ?–130)
PATIENT FASTING Y/N/NP: YES
TRIGL SERPL-MCNC: 131 MG/DL (ref 30–149)
VLDLC SERPL CALC-MCNC: 26 MG/DL (ref 0–30)

## 2020-07-17 PROCEDURE — 80061 LIPID PANEL: CPT

## 2020-07-17 PROCEDURE — 36415 COLL VENOUS BLD VENIPUNCTURE: CPT

## 2020-07-17 PROCEDURE — 71046 X-RAY EXAM CHEST 2 VIEWS: CPT | Performed by: FAMILY MEDICINE

## 2020-07-17 PROCEDURE — 87046 STOOL CULTR AEROBIC BACT EA: CPT

## 2020-07-17 PROCEDURE — 87427 SHIGA-LIKE TOXIN AG IA: CPT

## 2020-07-17 PROCEDURE — 87045 FECES CULTURE AEROBIC BACT: CPT

## 2020-07-20 DIAGNOSIS — E05.90 HYPERTHYROIDISM: ICD-10-CM

## 2020-07-20 DIAGNOSIS — R79.89 LOW TSH LEVEL: Primary | ICD-10-CM

## 2020-07-20 LAB — 25(OH)D3 SERPL-MCNC: 24.9 NG/ML (ref 30–100)

## 2020-07-27 ENCOUNTER — HOSPITAL ENCOUNTER (OUTPATIENT)
Dept: MAMMOGRAPHY | Age: 40
Discharge: HOME | End: 2020-07-27
Attending: FAMILY MEDICINE
Payer: COMMERCIAL

## 2020-07-27 DIAGNOSIS — Z12.31 ENCOUNTER FOR SCREENING MAMMOGRAM FOR BREAST CANCER: ICD-10-CM

## 2020-07-27 PROCEDURE — 77063 BREAST TOMOSYNTHESIS BI: CPT | Performed by: FAMILY MEDICINE

## 2020-07-27 PROCEDURE — 77067 SCR MAMMO BI INCL CAD: CPT | Performed by: FAMILY MEDICINE

## 2020-08-04 ENCOUNTER — HOSPITAL ENCOUNTER (OUTPATIENT)
Dept: ULTRASOUND IMAGING | Age: 40
Discharge: HOME | End: 2020-08-04
Attending: FAMILY MEDICINE
Payer: COMMERCIAL

## 2020-08-04 DIAGNOSIS — E05.90 HYPERTHYROIDISM: ICD-10-CM

## 2020-08-04 PROCEDURE — 76536 US EXAM OF HEAD AND NECK: CPT | Performed by: FAMILY MEDICINE

## 2020-08-05 ENCOUNTER — HOSPITAL ENCOUNTER (EMERGENCY)
Facility: HOSPITAL | Age: 40
Discharge: HOME | End: 2020-08-05
Attending: EMERGENCY MEDICINE
Payer: COMMERCIAL

## 2020-08-05 ENCOUNTER — HOSPITAL ENCOUNTER (OUTPATIENT)
Age: 40
Discharge: ANOTHER HOSPITAL | End: 2020-08-05
Attending: FAMILY MEDICINE
Payer: COMMERCIAL

## 2020-08-05 VITALS
HEART RATE: 73 BPM | BODY MASS INDEX: 21.98 KG/M2 | RESPIRATION RATE: 18 BRPM | HEIGHT: 68 IN | SYSTOLIC BLOOD PRESSURE: 127 MMHG | DIASTOLIC BLOOD PRESSURE: 92 MMHG | TEMPERATURE: 98 F | WEIGHT: 145 LBS

## 2020-08-05 VITALS
RESPIRATION RATE: 19 BRPM | DIASTOLIC BLOOD PRESSURE: 78 MMHG | SYSTOLIC BLOOD PRESSURE: 112 MMHG | OXYGEN SATURATION: 97 % | HEART RATE: 63 BPM

## 2020-08-05 DIAGNOSIS — R11.0 NAUSEA: ICD-10-CM

## 2020-08-05 DIAGNOSIS — H81.12 BENIGN PAROXYSMAL POSITIONAL VERTIGO OF LEFT EAR: Primary | ICD-10-CM

## 2020-08-05 DIAGNOSIS — R42 DIZZINESS: Primary | ICD-10-CM

## 2020-08-05 LAB
ANION GAP SERPL CALC-SCNC: 3 MMOL/L (ref 0–18)
B-HCG UR QL: NEGATIVE
BASOPHILS # BLD AUTO: 0.03 X10(3) UL (ref 0–0.2)
BASOPHILS NFR BLD AUTO: 0.4 %
BUN BLD-MCNC: 12 MG/DL (ref 7–18)
BUN/CREAT SERPL: 15.2 (ref 10–20)
CALCIUM BLD-MCNC: 9.2 MG/DL (ref 8.5–10.1)
CHLORIDE SERPL-SCNC: 106 MMOL/L (ref 98–112)
CO2 SERPL-SCNC: 30 MMOL/L (ref 21–32)
CREAT BLD-MCNC: 0.79 MG/DL (ref 0.55–1.02)
DEPRECATED RDW RBC AUTO: 39.1 FL (ref 35.1–46.3)
EOSINOPHIL # BLD AUTO: 0.19 X10(3) UL (ref 0–0.7)
EOSINOPHIL NFR BLD AUTO: 2.8 %
ERYTHROCYTE [DISTWIDTH] IN BLOOD BY AUTOMATED COUNT: 11.9 % (ref 11–15)
GLUCOSE BLD-MCNC: 134 MG/DL (ref 70–99)
GLUCOSE BLDC GLUCOMTR-MCNC: 147 MG/DL (ref 70–99)
HCT VFR BLD AUTO: 38.9 % (ref 35–48)
HGB BLD-MCNC: 13.1 G/DL (ref 12–16)
IMM GRANULOCYTES # BLD AUTO: 0.01 X10(3) UL (ref 0–1)
IMM GRANULOCYTES NFR BLD: 0.1 %
LYMPHOCYTES # BLD AUTO: 2.03 X10(3) UL (ref 1–4)
LYMPHOCYTES NFR BLD AUTO: 30.3 %
MCH RBC QN AUTO: 30 PG (ref 26–34)
MCHC RBC AUTO-ENTMCNC: 33.7 G/DL (ref 31–37)
MCV RBC AUTO: 89.2 FL (ref 80–100)
MONOCYTES # BLD AUTO: 0.39 X10(3) UL (ref 0.1–1)
MONOCYTES NFR BLD AUTO: 5.8 %
NEUTROPHILS # BLD AUTO: 4.05 X10 (3) UL (ref 1.5–7.7)
NEUTROPHILS # BLD AUTO: 4.05 X10(3) UL (ref 1.5–7.7)
NEUTROPHILS NFR BLD AUTO: 60.6 %
OSMOLALITY SERPL CALC.SUM OF ELEC: 290 MOSM/KG (ref 275–295)
PLATELET # BLD AUTO: 181 10(3)UL (ref 150–450)
POTASSIUM SERPL-SCNC: 3.4 MMOL/L (ref 3.5–5.1)
RBC # BLD AUTO: 4.36 X10(6)UL (ref 3.8–5.3)
SODIUM SERPL-SCNC: 139 MMOL/L (ref 136–145)
WBC # BLD AUTO: 6.7 X10(3) UL (ref 4–11)

## 2020-08-05 PROCEDURE — 82962 GLUCOSE BLOOD TEST: CPT

## 2020-08-05 PROCEDURE — 96375 TX/PRO/DX INJ NEW DRUG ADDON: CPT

## 2020-08-05 PROCEDURE — 81025 URINE PREGNANCY TEST: CPT

## 2020-08-05 PROCEDURE — 93010 ELECTROCARDIOGRAM REPORT: CPT | Performed by: EMERGENCY MEDICINE

## 2020-08-05 PROCEDURE — A9150 MISC/EXPER NON-PRESCRIPT DRU: HCPCS | Performed by: FAMILY MEDICINE

## 2020-08-05 PROCEDURE — 99284 EMERGENCY DEPT VISIT MOD MDM: CPT

## 2020-08-05 PROCEDURE — 96374 THER/PROPH/DIAG INJ IV PUSH: CPT

## 2020-08-05 PROCEDURE — 85025 COMPLETE CBC W/AUTO DIFF WBC: CPT | Performed by: EMERGENCY MEDICINE

## 2020-08-05 PROCEDURE — 93005 ELECTROCARDIOGRAM TRACING: CPT

## 2020-08-05 PROCEDURE — 99204 OFFICE O/P NEW MOD 45 MIN: CPT | Performed by: FAMILY MEDICINE

## 2020-08-05 PROCEDURE — 80048 BASIC METABOLIC PNL TOTAL CA: CPT | Performed by: EMERGENCY MEDICINE

## 2020-08-05 RX ORDER — METOCLOPRAMIDE HYDROCHLORIDE 5 MG/ML
5 INJECTION INTRAMUSCULAR; INTRAVENOUS ONCE
Status: COMPLETED | OUTPATIENT
Start: 2020-08-05 | End: 2020-08-05

## 2020-08-05 RX ORDER — ONDANSETRON 2 MG/ML
INJECTION INTRAMUSCULAR; INTRAVENOUS
Status: COMPLETED
Start: 2020-08-05 | End: 2020-08-05

## 2020-08-05 RX ORDER — ONDANSETRON 4 MG/1
4 TABLET, ORALLY DISINTEGRATING ORAL ONCE
Status: COMPLETED | OUTPATIENT
Start: 2020-08-05 | End: 2020-08-05

## 2020-08-05 RX ORDER — METOCLOPRAMIDE 10 MG/1
10 TABLET ORAL 3 TIMES DAILY PRN
Qty: 20 TABLET | Refills: 0 | Status: SHIPPED | OUTPATIENT
Start: 2020-08-05 | End: 2020-09-04

## 2020-08-05 RX ORDER — KETOROLAC TROMETHAMINE 15 MG/ML
15 INJECTION, SOLUTION INTRAMUSCULAR; INTRAVENOUS ONCE
Status: COMPLETED | OUTPATIENT
Start: 2020-08-05 | End: 2020-08-05

## 2020-08-05 RX ORDER — MECLIZINE HYDROCHLORIDE 25 MG/1
25 TABLET ORAL ONCE
Status: COMPLETED | OUTPATIENT
Start: 2020-08-05 | End: 2020-08-05

## 2020-08-05 RX ORDER — ONDANSETRON 2 MG/ML
4 INJECTION INTRAMUSCULAR; INTRAVENOUS ONCE
Status: COMPLETED | OUTPATIENT
Start: 2020-08-05 | End: 2020-08-05

## 2020-08-05 RX ORDER — MECLIZINE HYDROCHLORIDE 25 MG/1
25 TABLET ORAL 3 TIMES DAILY PRN
Qty: 20 TABLET | Refills: 0 | Status: ON HOLD | OUTPATIENT
Start: 2020-08-05 | End: 2021-06-25

## 2020-08-05 NOTE — ED INITIAL ASSESSMENT (HPI)
Sudden onset of intense dizziness started around one hour ago. Had angelica time getting out of car now nauseated and can barely walk.

## 2020-08-06 ENCOUNTER — TELEPHONE (OUTPATIENT)
Dept: FAMILY MEDICINE CLINIC | Facility: CLINIC | Age: 40
End: 2020-08-06

## 2020-08-06 ENCOUNTER — TELEMEDICINE (OUTPATIENT)
Dept: FAMILY MEDICINE CLINIC | Facility: CLINIC | Age: 40
End: 2020-08-06
Payer: COMMERCIAL

## 2020-08-06 DIAGNOSIS — H81.11 BENIGN PAROXYSMAL POSITIONAL VERTIGO OF RIGHT EAR: Primary | ICD-10-CM

## 2020-08-06 PROCEDURE — 99213 OFFICE O/P EST LOW 20 MIN: CPT | Performed by: PHYSICIAN ASSISTANT

## 2020-08-06 NOTE — ED INITIAL ASSESSMENT (HPI)
Pt c/o dizziness, spinning sensation since 5:45pm today. Pt also c/o nausea. Denies weakness/numbness. Speech clear.

## 2020-08-06 NOTE — ED PROVIDER NOTES
Patient Seen in: [facility] Emergency Department      History   Patient presents with:  Dizziness    Stated Complaint: cerebellar stroke/ ado    HPI    57-year-old female with past medical history significant for anxiety, hypothyroidism, psoriasis for stated complaint: cerebellar stroke/ ado  Other systems are as noted in HPI. Constitutional and vital signs reviewed. All other systems reviewed and negative except as noted above.     Physical Exam     ED Triage Vitals   BP 08/05/20 1945 123/84 CBC W/ DIFFERENTIAL[[id number]]                              Final result                 Please view results for these tests on the individual orders.    RAINBOW DRAW BLUE   RAINBOW DRAW LAVENDER   RAINBOW DRAW LIGHT GREEN   RAINBOW DRAW GOLD   CBC

## 2020-08-06 NOTE — PROGRESS NOTES
Please note that the following visit was completed using two-way, real-time interactive audio and/or video communication.   This has been done in good bennett to provide continuity of care in the best interest of the provider-patient relationship, due to the was made aware of where to find [facility] notice of privacy practices, telehealth consent form and other related consent forms and documents. which are located on the [facility] website.  The patient verbally agreed to telehealth consent form, related consents and th Metoclopramide HCl 10 MG Oral Tab Take 1 tablet (10 mg total) by mouth 3 (three) times daily as needed. 20 tablet 0   • betamethasone dipropionate 0.05 % External Cream Apply 1 Application topically 2 (two) times daily.  45 g 1   • PARAGARD INTRAUTERINE  min), 9 (5 min) - 1st degree perineal laceration\"   • Psoriasis          PHYSICAL EXAM:     Vitals signs taken at home if available:    Limited examination for this telephone visit due to the coronavirus emergency    Patient was speaking in complete sente

## 2020-08-06 NOTE — TELEPHONE ENCOUNTER
Patient reports seen in ED yesterday for Vertigo, was given medication for symptoms. Today continues to have dizziness, took 1 dose of Meclizine but not helping, laying down and limiting head movement does help with symptoms.  Reports ED also informed her t

## 2020-08-06 NOTE — ED PROVIDER NOTES
Patient Seen in: [facility] In [place]      History   Patient presents with:  Dizziness    Stated Complaint: dizzy; nausea; high bp    HPI    Pt is a 36 yo with acute onset of severe dizziness and nausea PTA. Pt is unable to walk. Temp src Temporal   SpO2    O2 Device        Current:BP (!) 127/92   Pulse 73   Temp 97.6 °F (36.4 °C) (Temporal)   Resp 18   Ht 172.7 cm (5' 8\")   Wt 65.8 kg   LMP 07/24/2020   BMI 22.05 kg/m²         Physical Exam  Vitals signs and nursing note review

## 2020-08-07 ENCOUNTER — OFFICE VISIT (OUTPATIENT)
Dept: OTOLARYNGOLOGY | Facility: CLINIC | Age: 40
End: 2020-08-07
Payer: COMMERCIAL

## 2020-08-07 ENCOUNTER — OFFICE VISIT (OUTPATIENT)
Dept: AUDIOLOGY | Facility: CLINIC | Age: 40
End: 2020-08-07
Payer: COMMERCIAL

## 2020-08-07 VITALS
HEIGHT: 68 IN | BODY MASS INDEX: 21.98 KG/M2 | SYSTOLIC BLOOD PRESSURE: 129 MMHG | HEART RATE: 81 BPM | WEIGHT: 145 LBS | DIASTOLIC BLOOD PRESSURE: 87 MMHG

## 2020-08-07 DIAGNOSIS — R42 DIZZINESS: Primary | ICD-10-CM

## 2020-08-07 PROCEDURE — 3079F DIAST BP 80-89 MM HG: CPT | Performed by: OTOLARYNGOLOGY

## 2020-08-07 PROCEDURE — 92557 COMPREHENSIVE HEARING TEST: CPT | Performed by: AUDIOLOGIST

## 2020-08-07 PROCEDURE — 92567 TYMPANOMETRY: CPT | Performed by: AUDIOLOGIST

## 2020-08-07 PROCEDURE — 3074F SYST BP LT 130 MM HG: CPT | Performed by: OTOLARYNGOLOGY

## 2020-08-07 PROCEDURE — 3008F BODY MASS INDEX DOCD: CPT | Performed by: OTOLARYNGOLOGY

## 2020-08-07 PROCEDURE — 99243 OFF/OP CNSLTJ NEW/EST LOW 30: CPT | Performed by: OTOLARYNGOLOGY

## 2020-08-07 NOTE — PROGRESS NOTES
Kathy Alberto is a 37year old female.   Patient presents with:  Dizziness: started wed, turning and moving eyes causes episodes, nausea , pt seen in ER on Wed, pt started on meclinzine and anti nausea , per pt meclizine is helping       HISTORY OF PRESENT (Coffee, Soda) 2 cups daily        Pt has a pacemaker: No        Pt has a defibrillator: No        Reaction to local anesthetic: No      Family History   Problem Relation Age of Onset   • Thyroid Disorder Mother    • Hypertension Father    • Kidney Hema/Lymph Negative Easy bleeding and easy bruising.            PHYSICAL EXAM    /87   Pulse 81   Ht 5' 8\" (1.727 m)   Wt 145 lb (65.8 kg)   LMP 07/24/2020   BMI 22.05 kg/m²        Constitutional Normal Overall appearance - Normal.   Psychiatric No dipropionate 0.05 % External Cream, Apply 1 Application topically 2 (two) times daily. , Disp: 45 g, Rfl: 1  •  PARAGARD INTRAUTERINE COPPER Intrauterine IUD, by Intrauterine route., Disp: , Rfl:   •  Clobetasol Propionate 0.05 % External Foam, Apply to aff

## 2020-08-07 NOTE — PROGRESS NOTES
AUDIOLOGY REPORT      Kely Kang is a 37year old female     Referring Provider: Regine Chung   YOB: 1980  Medical Record: [id number]      Patient Hearing History:  Patient reported dizziness.        Otoscopic Inspection:  Right ear:  No

## 2020-08-12 ENCOUNTER — OFFICE VISIT (OUTPATIENT)
Dept: OTOLARYNGOLOGY | Facility: CLINIC | Age: 40
End: 2020-08-12
Payer: COMMERCIAL

## 2020-08-12 VITALS
HEIGHT: 68 IN | DIASTOLIC BLOOD PRESSURE: 78 MMHG | WEIGHT: 145 LBS | TEMPERATURE: 98 F | BODY MASS INDEX: 21.98 KG/M2 | SYSTOLIC BLOOD PRESSURE: 113 MMHG

## 2020-08-12 DIAGNOSIS — H81.10 BENIGN PAROXYSMAL POSITIONAL VERTIGO, UNSPECIFIED LATERALITY: ICD-10-CM

## 2020-08-12 DIAGNOSIS — M54.2 CERVICALGIA: Primary | ICD-10-CM

## 2020-08-12 PROCEDURE — 99214 OFFICE O/P EST MOD 30 MIN: CPT | Performed by: OTOLARYNGOLOGY

## 2020-08-12 PROCEDURE — 3074F SYST BP LT 130 MM HG: CPT | Performed by: OTOLARYNGOLOGY

## 2020-08-12 PROCEDURE — 3008F BODY MASS INDEX DOCD: CPT | Performed by: OTOLARYNGOLOGY

## 2020-08-12 PROCEDURE — 3078F DIAST BP <80 MM HG: CPT | Performed by: OTOLARYNGOLOGY

## 2020-08-12 NOTE — PROGRESS NOTES
Kevin Turner is a 37year old female. Patient presents with:   Follow - Up: 1 week follow up- dizziness- per pt there is changes/improvement of symptoms  Ear Problem: c/o left ear pain for 3 weeks      HISTORY OF PRESENT ILLNESS  8/7/2020  The patient pr       Spouse name: Not on file      Number of children: 2      Years of education: Not on file      Highest education level: Not on file    Occupational History      Occupation: teacher        Comment: elementary    Tobacco Use      Smoking status: UROLOGY SURGERY PROCEDURE UNLISTED  1986    Ureteral reconstruction         REVIEW OF SYSTEMS    System Neg/Pos Details   Constitutional Negative Fatigue, fever and weight loss.    ENMT Negative Neck mass headaches   Eyes Negative Blurred vision and vision with  Normal mobility and mucosa is free of lesions Oropharynx -cobblestoning            Current Outpatient Medications:   •  Nutritional Supplements (VITAMIN D BOOSTER OR), Take by mouth., Disp: , Rfl:   •  Meclizine HCl 25 MG Oral Tab, Take 1 tablet (25

## 2020-08-14 PROBLEM — R42 VERTIGO: Status: ACTIVE | Noted: 2020-08-05

## 2020-08-24 ENCOUNTER — HOSPITAL ENCOUNTER (OUTPATIENT)
Dept: CT IMAGING | Age: 40
Discharge: HOME | End: 2020-08-24
Attending: OTOLARYNGOLOGY
Payer: COMMERCIAL

## 2020-08-24 DIAGNOSIS — M54.2 CERVICALGIA: ICD-10-CM

## 2020-08-24 PROCEDURE — 70491 CT SOFT TISSUE NECK W/DYE: CPT | Performed by: OTOLARYNGOLOGY

## 2021-06-18 ENCOUNTER — TELEPHONE (OUTPATIENT)
Dept: OBGYN CLINIC | Facility: CLINIC | Age: 41
End: 2021-06-18

## 2021-06-18 ENCOUNTER — OFFICE VISIT (OUTPATIENT)
Dept: OBGYN CLINIC | Facility: CLINIC | Age: 41
End: 2021-06-18
Payer: COMMERCIAL

## 2021-06-18 VITALS — SYSTOLIC BLOOD PRESSURE: 122 MMHG | DIASTOLIC BLOOD PRESSURE: 78 MMHG

## 2021-06-18 DIAGNOSIS — Z12.31 ENCOUNTER FOR SCREENING MAMMOGRAM FOR MALIGNANT NEOPLASM OF BREAST: ICD-10-CM

## 2021-06-18 DIAGNOSIS — Z30.432 ENCOUNTER FOR IUD REMOVAL: ICD-10-CM

## 2021-06-18 DIAGNOSIS — Z30.2 REQUEST FOR STERILIZATION: Primary | ICD-10-CM

## 2021-06-18 DIAGNOSIS — Z01.419 ENCOUNTER FOR WELL WOMAN EXAM WITH ROUTINE GYNECOLOGICAL EXAM: Primary | ICD-10-CM

## 2021-06-18 PROCEDURE — 58301 REMOVE INTRAUTERINE DEVICE: CPT | Performed by: OBSTETRICS & GYNECOLOGY

## 2021-06-18 PROCEDURE — 3074F SYST BP LT 130 MM HG: CPT | Performed by: OBSTETRICS & GYNECOLOGY

## 2021-06-18 PROCEDURE — 3078F DIAST BP <80 MM HG: CPT | Performed by: OBSTETRICS & GYNECOLOGY

## 2021-06-18 PROCEDURE — 99386 PREV VISIT NEW AGE 40-64: CPT | Performed by: OBSTETRICS & GYNECOLOGY

## 2021-06-18 NOTE — TELEPHONE ENCOUNTER
Please schedule the following surgery:    Procedure:operative laparoscopy, right salpingectomy, left salpingectomy  Assist: (Y/N or none) yes  Date: jun 23, 2021 to follow 715 am case (I spoke with Jackson Channel at surgery scheduling)  Dx:voluntary permanent sterilization  Pre-op appt: (Y/N or n/a)no  Admission type: (IN/OUT/OBVS)  Department of discharge(SDS/Floor):  Expected length of stay: outpt  Procedure length time (please enter amount you are requesting):1 hour  Recovery time (patients always ask): 1 week  Medical Clearance: (Y/N)no    ALL Medicaid/including BCBS community: Tubal/ Hyst form MUST be signed (30 days):     COVID19 Lab 5062 needs to be placed for all C-sections, IOL & Versions     Message to nurses:

## 2021-06-18 NOTE — PROGRESS NOTES
HPI:   Ulises Frey is a 41year old female who presents for an annual/pap. And requesting iud removal.  paragard placed 10 years ago by dr Rogelio Aldana. Pt counseled and all questions answered.       Wt Readings from Last 6 Encounters:  08/12/20 : 145 lb (65. [facility]; Provider:  Fara Murray;  Outcome/detail:  40 week 8 lb(S) 6oz Female; Comments:  Rachel (JLK)\"   • Pregnancy 2010    Pregnancy; Management:  KRISTEL - ANNALEE Cuellar; Provider:  Bennie Foster; Comments:  APGARS 9 (1 min), 9 (5 min) - 1st degree perineal lacera LMP 06/11/2021   There is no height or weight on file to calculate BMI.    GENERAL: well developed, well nourished,in no apparent distress  SKIN: no rashes,no suspicious lesions  HEENT: atraumatic, normocephalic  EYES:normal in appearance  NECK: supple,no

## 2021-06-21 NOTE — TELEPHONE ENCOUNTER
Pt is requesting for sx to be done on Friday July 9th so she can have the weekend to recover, would you like for me to r/s pt that day? Pls advise.

## 2021-06-22 NOTE — TELEPHONE ENCOUNTER
Pt informed of sx date and time, she is aware she needs to arrive 2 hours prior to sx.  She had no further questions.    -assist pending

## 2021-06-23 ENCOUNTER — TELEPHONE (OUTPATIENT)
Dept: OBGYN CLINIC | Facility: CLINIC | Age: 41
End: 2021-06-23

## 2021-06-23 ENCOUNTER — LAB ENCOUNTER (OUTPATIENT)
Dept: LAB | Facility: HOSPITAL | Age: 41
End: 2021-06-23
Attending: OBSTETRICS & GYNECOLOGY
Payer: COMMERCIAL

## 2021-06-23 DIAGNOSIS — Z01.818 PREOPERATIVE TESTING: ICD-10-CM

## 2021-06-23 NOTE — TELEPHONE ENCOUNTER
Patient scheduled 6/25 for surgery and has not yet received a call to schedule covid, please call at [phone], thanks.   Tried connecting to surgery scheduler

## 2021-06-25 ENCOUNTER — HOSPITAL ENCOUNTER (OUTPATIENT)
Facility: HOSPITAL | Age: 41
Setting detail: HOSPITAL OUTPATIENT SURGERY
Discharge: HOME | End: 2021-06-25
Attending: OBSTETRICS & GYNECOLOGY | Admitting: OBSTETRICS & GYNECOLOGY
Payer: COMMERCIAL

## 2021-06-25 ENCOUNTER — ANESTHESIA (OUTPATIENT)
Dept: SURGERY | Facility: HOSPITAL | Age: 41
End: 2021-06-25
Payer: COMMERCIAL

## 2021-06-25 ENCOUNTER — ANESTHESIA EVENT (OUTPATIENT)
Dept: SURGERY | Facility: HOSPITAL | Age: 41
End: 2021-06-25
Payer: COMMERCIAL

## 2021-06-25 VITALS
OXYGEN SATURATION: 100 % | DIASTOLIC BLOOD PRESSURE: 62 MMHG | WEIGHT: 151 LBS | BODY MASS INDEX: 22.88 KG/M2 | TEMPERATURE: 98 F | SYSTOLIC BLOOD PRESSURE: 105 MMHG | RESPIRATION RATE: 16 BRPM | HEIGHT: 68 IN | HEART RATE: 64 BPM

## 2021-06-25 DIAGNOSIS — Z01.818 PREOPERATIVE TESTING: Primary | ICD-10-CM

## 2021-06-25 DIAGNOSIS — Z30.2 REQUEST FOR STERILIZATION: ICD-10-CM

## 2021-06-25 PROCEDURE — 58661 LAPAROSCOPY REMOVE ADNEXA: CPT | Performed by: OBSTETRICS & GYNECOLOGY

## 2021-06-25 PROCEDURE — 0UT74ZZ RESECTION OF BILATERAL FALLOPIAN TUBES, PERCUTANEOUS ENDOSCOPIC APPROACH: ICD-10-PCS | Performed by: OBSTETRICS & GYNECOLOGY

## 2021-06-25 RX ORDER — HYDROMORPHONE HYDROCHLORIDE 1 MG/ML
0.4 INJECTION, SOLUTION INTRAMUSCULAR; INTRAVENOUS; SUBCUTANEOUS EVERY 5 MIN PRN
Status: DISCONTINUED | OUTPATIENT
Start: 2021-06-25 | End: 2021-06-25

## 2021-06-25 RX ORDER — SODIUM CHLORIDE, SODIUM LACTATE, POTASSIUM CHLORIDE, CALCIUM CHLORIDE 600; 310; 30; 20 MG/100ML; MG/100ML; MG/100ML; MG/100ML
INJECTION, SOLUTION INTRAVENOUS CONTINUOUS
Status: DISCONTINUED | OUTPATIENT
Start: 2021-06-25 | End: 2021-06-25

## 2021-06-25 RX ORDER — GLYCOPYRROLATE 0.2 MG/ML
INJECTION, SOLUTION INTRAMUSCULAR; INTRAVENOUS AS NEEDED
Status: DISCONTINUED | OUTPATIENT
Start: 2021-06-25 | End: 2021-06-25 | Stop reason: SURG

## 2021-06-25 RX ORDER — HALOPERIDOL 5 MG/ML
0.25 INJECTION INTRAMUSCULAR ONCE AS NEEDED
Status: DISCONTINUED | OUTPATIENT
Start: 2021-06-25 | End: 2021-06-25

## 2021-06-25 RX ORDER — EPHEDRINE SULFATE 50 MG/ML
INJECTION, SOLUTION INTRAVENOUS AS NEEDED
Status: DISCONTINUED | OUTPATIENT
Start: 2021-06-25 | End: 2021-06-25 | Stop reason: SURG

## 2021-06-25 RX ORDER — ONDANSETRON 2 MG/ML
INJECTION INTRAMUSCULAR; INTRAVENOUS AS NEEDED
Status: DISCONTINUED | OUTPATIENT
Start: 2021-06-25 | End: 2021-06-25 | Stop reason: SURG

## 2021-06-25 RX ORDER — HYDROCODONE BITARTRATE AND ACETAMINOPHEN 5; 325 MG/1; MG/1
1 TABLET ORAL EVERY 6 HOURS PRN
Qty: 12 TABLET | Refills: 0 | Status: SHIPPED | OUTPATIENT
Start: 2021-06-25 | End: 2021-07-12

## 2021-06-25 RX ORDER — NALOXONE HYDROCHLORIDE 0.4 MG/ML
80 INJECTION, SOLUTION INTRAMUSCULAR; INTRAVENOUS; SUBCUTANEOUS AS NEEDED
Status: DISCONTINUED | OUTPATIENT
Start: 2021-06-25 | End: 2021-06-25

## 2021-06-25 RX ORDER — HYDROCODONE BITARTRATE AND ACETAMINOPHEN 5; 325 MG/1; MG/1
2 TABLET ORAL AS NEEDED
Status: DISCONTINUED | OUTPATIENT
Start: 2021-06-25 | End: 2021-06-25

## 2021-06-25 RX ORDER — ROCURONIUM BROMIDE 10 MG/ML
INJECTION, SOLUTION INTRAVENOUS AS NEEDED
Status: DISCONTINUED | OUTPATIENT
Start: 2021-06-25 | End: 2021-06-25 | Stop reason: SURG

## 2021-06-25 RX ORDER — MORPHINE SULFATE 2 MG/ML
2 INJECTION, SOLUTION INTRAMUSCULAR; INTRAVENOUS EVERY 10 MIN PRN
Status: DISCONTINUED | OUTPATIENT
Start: 2021-06-25 | End: 2021-06-25

## 2021-06-25 RX ORDER — HYDROCODONE BITARTRATE AND ACETAMINOPHEN 5; 325 MG/1; MG/1
1 TABLET ORAL AS NEEDED
Status: DISCONTINUED | OUTPATIENT
Start: 2021-06-25 | End: 2021-06-25

## 2021-06-25 RX ORDER — LIDOCAINE HYDROCHLORIDE 10 MG/ML
INJECTION, SOLUTION EPIDURAL; INFILTRATION; INTRACAUDAL; PERINEURAL AS NEEDED
Status: DISCONTINUED | OUTPATIENT
Start: 2021-06-25 | End: 2021-06-25 | Stop reason: SURG

## 2021-06-25 RX ORDER — HYDROMORPHONE HYDROCHLORIDE 1 MG/ML
0.6 INJECTION, SOLUTION INTRAMUSCULAR; INTRAVENOUS; SUBCUTANEOUS EVERY 5 MIN PRN
Status: DISCONTINUED | OUTPATIENT
Start: 2021-06-25 | End: 2021-06-25

## 2021-06-25 RX ORDER — MORPHINE SULFATE 10 MG/ML
6 INJECTION, SOLUTION INTRAMUSCULAR; INTRAVENOUS EVERY 10 MIN PRN
Status: DISCONTINUED | OUTPATIENT
Start: 2021-06-25 | End: 2021-06-25

## 2021-06-25 RX ORDER — MIDAZOLAM HYDROCHLORIDE 1 MG/ML
INJECTION INTRAMUSCULAR; INTRAVENOUS AS NEEDED
Status: DISCONTINUED | OUTPATIENT
Start: 2021-06-25 | End: 2021-06-25 | Stop reason: SURG

## 2021-06-25 RX ORDER — NEOSTIGMINE METHYLSULFATE 1 MG/ML
INJECTION INTRAVENOUS AS NEEDED
Status: DISCONTINUED | OUTPATIENT
Start: 2021-06-25 | End: 2021-06-25 | Stop reason: SURG

## 2021-06-25 RX ORDER — DEXAMETHASONE SODIUM PHOSPHATE 4 MG/ML
VIAL (ML) INJECTION AS NEEDED
Status: DISCONTINUED | OUTPATIENT
Start: 2021-06-25 | End: 2021-06-25 | Stop reason: SURG

## 2021-06-25 RX ORDER — ACETAMINOPHEN 500 MG
1000 TABLET ORAL ONCE
Status: COMPLETED | OUTPATIENT
Start: 2021-06-25 | End: 2021-06-25

## 2021-06-25 RX ORDER — BUPIVACAINE HYDROCHLORIDE 2.5 MG/ML
INJECTION, SOLUTION EPIDURAL; INFILTRATION; INTRACAUDAL AS NEEDED
Status: DISCONTINUED | OUTPATIENT
Start: 2021-06-25 | End: 2021-06-25

## 2021-06-25 RX ORDER — MORPHINE SULFATE 4 MG/ML
4 INJECTION, SOLUTION INTRAMUSCULAR; INTRAVENOUS EVERY 10 MIN PRN
Status: DISCONTINUED | OUTPATIENT
Start: 2021-06-25 | End: 2021-06-25

## 2021-06-25 RX ORDER — HYDROMORPHONE HYDROCHLORIDE 1 MG/ML
0.2 INJECTION, SOLUTION INTRAMUSCULAR; INTRAVENOUS; SUBCUTANEOUS EVERY 5 MIN PRN
Status: DISCONTINUED | OUTPATIENT
Start: 2021-06-25 | End: 2021-06-25

## 2021-06-25 RX ORDER — ALBUTEROL SULFATE 2.5 MG/3ML
2.5 SOLUTION RESPIRATORY (INHALATION)
Status: DISCONTINUED | OUTPATIENT
Start: 2021-06-25 | End: 2021-06-25

## 2021-06-25 RX ORDER — PROCHLORPERAZINE EDISYLATE 5 MG/ML
5 INJECTION INTRAMUSCULAR; INTRAVENOUS ONCE AS NEEDED
Status: DISCONTINUED | OUTPATIENT
Start: 2021-06-25 | End: 2021-06-25

## 2021-06-25 RX ORDER — ONDANSETRON 2 MG/ML
4 INJECTION INTRAMUSCULAR; INTRAVENOUS ONCE AS NEEDED
Status: DISCONTINUED | OUTPATIENT
Start: 2021-06-25 | End: 2021-06-25

## 2021-06-25 RX ADMIN — MIDAZOLAM HYDROCHLORIDE 2 MG: 1 INJECTION INTRAMUSCULAR; INTRAVENOUS at 14:49:00

## 2021-06-25 RX ADMIN — SODIUM CHLORIDE, SODIUM LACTATE, POTASSIUM CHLORIDE, CALCIUM CHLORIDE: 600; 310; 30; 20 INJECTION, SOLUTION INTRAVENOUS at 14:49:00

## 2021-06-25 RX ADMIN — LIDOCAINE HYDROCHLORIDE 50 MG: 10 INJECTION, SOLUTION EPIDURAL; INFILTRATION; INTRACAUDAL; PERINEURAL at 14:51:00

## 2021-06-25 RX ADMIN — EPHEDRINE SULFATE 10 MG: 50 INJECTION, SOLUTION INTRAVENOUS at 15:00:00

## 2021-06-25 RX ADMIN — NEOSTIGMINE METHYLSULFATE 4 MG: 1 INJECTION INTRAVENOUS at 15:30:00

## 2021-06-25 RX ADMIN — DEXAMETHASONE SODIUM PHOSPHATE 4 MG: 4 MG/ML VIAL (ML) INJECTION at 15:10:00

## 2021-06-25 RX ADMIN — EPHEDRINE SULFATE 5 MG: 50 INJECTION, SOLUTION INTRAVENOUS at 15:05:00

## 2021-06-25 RX ADMIN — GLYCOPYRROLATE 0.8 MG: 0.2 INJECTION, SOLUTION INTRAMUSCULAR; INTRAVENOUS at 15:30:00

## 2021-06-25 RX ADMIN — ONDANSETRON 4 MG: 2 INJECTION INTRAMUSCULAR; INTRAVENOUS at 15:30:00

## 2021-06-25 RX ADMIN — ROCURONIUM BROMIDE 50 MG: 10 INJECTION, SOLUTION INTRAVENOUS at 14:52:00

## 2021-06-25 NOTE — ANESTHESIA PREPROCEDURE EVALUATION
Anesthesia PreOp Note    HPI:     Stefany Chavez is a 41year old female who presents for preoperative consultation requested by:  Carli Tucker MD    Date of Surgery: 6/25/2021    Procedure(s):  operative laparoscopy, right salpingectomy, left salpi impairment     contacts       Past Surgical History:   Procedure Laterality Date   • INCISE EXTERNAL HEMORRHOID  9/19/13    L posterior quad   • INCISE EXTERNAL HEMORRHOID  10/29/15    R posterior quad   • SKIN SURGERY Left 10/2015    wart removal   • UROL Occupational History      Occupation: teacher        Comment: elementary    Tobacco Use      Smoking status: Current Every Day Smoker        Packs/day: 0.50        Years: 10.00        Pack years: 5        Types: Cigarettes      Smokeless tobacco: Never U Attends Jewish Services:       Active Member of Clubs or Organizations:       Attends Club or Organization Meetings:       Marital Status:   Intimate Partner Violence:       Fear of Current or Ex-Partner:       Emotionally Abused:       Physically Abuse questions were answered to the best of my ability. The patient desires the anesthetic management as planned.   Pastor Villa  6/25/2021 2:38 PM

## 2021-06-25 NOTE — ANESTHESIA PROCEDURE NOTES
Airway  Date/Time: 6/25/2021 2:55 PM  Urgency: elective    Airway not difficult    General Information and Staff    Patient location during procedure: OR  Anesthesiologist: Marc Villalobos MD  Performed: anesthesiologist     Indications and Patient Condi

## 2021-06-25 NOTE — BRIEF OP NOTE
Pre-Operative Diagnosis: Request for sterilization [Z30.2]     Post-Operative Diagnosis: Request for sterilization [Z30.2]      Procedure Performed:   operative laparoscopy, right salpingectomy, left salpingectomy    Surgeon(s) and Role:     * Ar Diallo

## 2021-06-25 NOTE — ANESTHESIA POSTPROCEDURE EVALUATION
Patient: Jesus Alberto Cole    Procedure Summary     Date: 06/25/21 Room / Location: [address] [place] [address] MAIN OR 02 / [address] [place] [address] MAIN OR    Anesthesia Start: 6627 Anesthesia Stop: 1668    Procedure: operative laparoscopy, right salpingectomy, left salpingectomy (Bilateral Abdomen) D

## 2021-06-25 NOTE — H&P
HPI:   Devonte Holguin is a 41year old female who presents requesting voluntary permanent sterilization.  Pt was counseled on the risks/benefits/alternatives of an operative laparoscopy/right salpingectomy/left salpingectomy, including the risks of bleeding Intrauterine route.         Past Medical History:   Diagnosis Date   • Anxiety    • Anxiety state, unspecified    • Chronic UTI     Chronic UTI, congenital anomaly   • Contraception 2011    IUD   • Hyperthyroidism    • Kidney stones    • Medullary sponge ki breath with exertion  CARDIOVASCULAR: denies chest pain on exertion  GI: denies abdominal pain,denies heartburn  : denies dysuria, vaginal discharge or itching,periods regular   MUSCULOSKELETAL: denies back pain  NEURO: denies headaches  PSYCHE: denies d method should be considered.      Test performed using the [employer] Alinity SARS-CoV-2 real-time reverse transcriptase (RT) polymerase chain reaction (PCR), (RT-PCR) assay on the [facility], [address] [place] [address]     This test                                                   First Screen:          Madi Cruz                                                                     Specimen:    ThinPrep Imager Screening Pap, Cervical/endocervical                                       R

## 2021-06-26 NOTE — OPERATIVE REPORT
[facility]    PATIENT'S NAME: Jr Ashton   ATTENDING PHYSICIAN: Broderick Keen MD   OPERATING PHYSICIAN: Abelardo eKen MD   PATIENT ACCOUNT#:   [de-identified]    LOCATION:  [facility] 10  MEDICAL RECORD #:   [id number] were begun prior to procedure, operative throughout the procedure, and will be operative postop as well for DVT prophylaxis.   A sterile speculum was placed in the vagina and the HUMI uterine manipulator was placed in the intrauterine cavity without difficu hemostasis noted vaginally as well. The patient tolerated the procedure well, was taken to PAR in good condition. Dictated By Kurt Hurley MD  d: 06/25/2021 16:27:07  t: 06/26/2021 03:54:15  Job [id number]  AAS/

## 2021-06-30 ENCOUNTER — TELEPHONE (OUTPATIENT)
Dept: OBGYN CLINIC | Facility: CLINIC | Age: 41
End: 2021-06-30

## 2021-06-30 ENCOUNTER — APPOINTMENT (OUTPATIENT)
Dept: CT IMAGING | Facility: HOSPITAL | Age: 41
End: 2021-06-30
Attending: NURSE PRACTITIONER
Payer: COMMERCIAL

## 2021-06-30 ENCOUNTER — HOSPITAL ENCOUNTER (EMERGENCY)
Facility: HOSPITAL | Age: 41
Discharge: HOME | End: 2021-06-30
Payer: COMMERCIAL

## 2021-06-30 ENCOUNTER — HOSPITAL ENCOUNTER (OUTPATIENT)
Age: 41
Discharge: ANOTHER HOSPITAL | End: 2021-06-30
Payer: COMMERCIAL

## 2021-06-30 VITALS
RESPIRATION RATE: 16 BRPM | HEART RATE: 68 BPM | BODY MASS INDEX: 22.73 KG/M2 | DIASTOLIC BLOOD PRESSURE: 73 MMHG | TEMPERATURE: 98 F | WEIGHT: 150 LBS | OXYGEN SATURATION: 100 % | HEIGHT: 68 IN | SYSTOLIC BLOOD PRESSURE: 119 MMHG

## 2021-06-30 VITALS
HEART RATE: 78 BPM | RESPIRATION RATE: 18 BRPM | WEIGHT: 150 LBS | OXYGEN SATURATION: 99 % | DIASTOLIC BLOOD PRESSURE: 70 MMHG | BODY MASS INDEX: 22.73 KG/M2 | TEMPERATURE: 98 F | SYSTOLIC BLOOD PRESSURE: 126 MMHG | HEIGHT: 68 IN

## 2021-06-30 DIAGNOSIS — R14.0 ABDOMINAL DISTENTION: Primary | ICD-10-CM

## 2021-06-30 DIAGNOSIS — R06.00 DYSPNEA, UNSPECIFIED TYPE: ICD-10-CM

## 2021-06-30 DIAGNOSIS — N30.01 ACUTE CYSTITIS WITH HEMATURIA: Primary | ICD-10-CM

## 2021-06-30 LAB
ALBUMIN SERPL-MCNC: 4.2 G/DL (ref 3.4–5)
ALBUMIN/GLOB SERPL: 1.1 {RATIO} (ref 1–2)
ALP LIVER SERPL-CCNC: 62 U/L
ALT SERPL-CCNC: 28 U/L
ANION GAP SERPL CALC-SCNC: 8 MMOL/L (ref 0–18)
AST SERPL-CCNC: 19 U/L (ref 15–37)
BASOPHILS # BLD AUTO: 0.05 X10(3) UL (ref 0–0.2)
BASOPHILS NFR BLD AUTO: 0.6 %
BILIRUB SERPL-MCNC: 0.4 MG/DL (ref 0.1–2)
BILIRUB UR QL: NEGATIVE
BUN BLD-MCNC: 16 MG/DL (ref 7–18)
BUN/CREAT SERPL: 21.1 (ref 10–20)
CALCIUM BLD-MCNC: 9 MG/DL (ref 8.5–10.1)
CHLORIDE SERPL-SCNC: 106 MMOL/L (ref 98–112)
CO2 SERPL-SCNC: 26 MMOL/L (ref 21–32)
COLOR UR: YELLOW
CREAT BLD-MCNC: 0.76 MG/DL
DEPRECATED RDW RBC AUTO: 39.9 FL (ref 35.1–46.3)
EOSINOPHIL # BLD AUTO: 0.24 X10(3) UL (ref 0–0.7)
EOSINOPHIL NFR BLD AUTO: 2.8 %
ERYTHROCYTE [DISTWIDTH] IN BLOOD BY AUTOMATED COUNT: 12.2 % (ref 11–15)
GLOBULIN PLAS-MCNC: 3.9 G/DL (ref 2.8–4.4)
GLUCOSE BLD-MCNC: 85 MG/DL (ref 70–99)
GLUCOSE UR-MCNC: NEGATIVE MG/DL
HCT VFR BLD AUTO: 40.7 %
HGB BLD-MCNC: 13.4 G/DL
IMM GRANULOCYTES # BLD AUTO: 0.02 X10(3) UL (ref 0–1)
IMM GRANULOCYTES NFR BLD: 0.2 %
KETONES UR-MCNC: NEGATIVE MG/DL
LIPASE SERPL-CCNC: 100 U/L (ref 73–393)
LYMPHOCYTES # BLD AUTO: 2.78 X10(3) UL (ref 1–4)
LYMPHOCYTES NFR BLD AUTO: 32.1 %
M PROTEIN MFR SERPL ELPH: 8.1 G/DL (ref 6.4–8.2)
MCH RBC QN AUTO: 29.6 PG (ref 26–34)
MCHC RBC AUTO-ENTMCNC: 32.9 G/DL (ref 31–37)
MCV RBC AUTO: 89.8 FL
MONOCYTES # BLD AUTO: 0.7 X10(3) UL (ref 0.1–1)
MONOCYTES NFR BLD AUTO: 8.1 %
NEUTROPHILS # BLD AUTO: 4.86 X10 (3) UL (ref 1.5–7.7)
NEUTROPHILS # BLD AUTO: 4.86 X10(3) UL (ref 1.5–7.7)
NEUTROPHILS NFR BLD AUTO: 56.2 %
NITRITE UR QL STRIP.AUTO: POSITIVE
OSMOLALITY SERPL CALC.SUM OF ELEC: 290 MOSM/KG (ref 275–295)
PH UR: 6 [PH] (ref 5–8)
PLATELET # BLD AUTO: 166 10(3)UL (ref 150–450)
POTASSIUM SERPL-SCNC: 3.5 MMOL/L (ref 3.5–5.1)
PROT UR-MCNC: NEGATIVE MG/DL
RBC # BLD AUTO: 4.53 X10(6)UL
SODIUM SERPL-SCNC: 140 MMOL/L (ref 136–145)
SP GR UR STRIP: 1.02 (ref 1–1.03)
UROBILINOGEN UR STRIP-ACNC: <2
WBC # BLD AUTO: 8.7 X10(3) UL (ref 4–11)

## 2021-06-30 PROCEDURE — 74177 CT ABD & PELVIS W/CONTRAST: CPT | Performed by: NURSE PRACTITIONER

## 2021-06-30 PROCEDURE — 87086 URINE CULTURE/COLONY COUNT: CPT

## 2021-06-30 PROCEDURE — 80053 COMPREHEN METABOLIC PANEL: CPT | Performed by: NURSE PRACTITIONER

## 2021-06-30 PROCEDURE — 99215 OFFICE O/P EST HI 40 MIN: CPT | Performed by: NURSE PRACTITIONER

## 2021-06-30 PROCEDURE — 85025 COMPLETE CBC W/AUTO DIFF WBC: CPT | Performed by: NURSE PRACTITIONER

## 2021-06-30 PROCEDURE — 87186 SC STD MICRODIL/AGAR DIL: CPT

## 2021-06-30 PROCEDURE — 87088 URINE BACTERIA CULTURE: CPT

## 2021-06-30 PROCEDURE — 81001 URINALYSIS AUTO W/SCOPE: CPT

## 2021-06-30 PROCEDURE — 83690 ASSAY OF LIPASE: CPT | Performed by: NURSE PRACTITIONER

## 2021-06-30 PROCEDURE — 36415 COLL VENOUS BLD VENIPUNCTURE: CPT

## 2021-06-30 PROCEDURE — 99284 EMERGENCY DEPT VISIT MOD MDM: CPT

## 2021-06-30 RX ORDER — CEPHALEXIN 250 MG/1
250 CAPSULE ORAL 4 TIMES DAILY
Qty: 40 CAPSULE | Refills: 0 | Status: SHIPPED | OUTPATIENT
Start: 2021-06-30 | End: 2021-07-10

## 2021-06-30 NOTE — TELEPHONE ENCOUNTER
Pt had bilateral salpingectomy on 6/25/21. Pt voices she had severe gas pain, right shoulder pain on saturday and sunday, and hardly slept either night.  After the weekend, pt voices gas pain is better, but she still has no appetite and feels nauseous af

## 2021-06-30 NOTE — TELEPHONE ENCOUNTER
Pt stated she feels bloated and has some urinary hesitancy,  pushes when she urinates. Passing gas and has had normal bowel movements. Pt stated she is now going to Urgent care for evaluation.

## 2021-06-30 NOTE — ED INITIAL ASSESSMENT (HPI)
C/o difficulty urinating, bloating, abdominal pain, and feels winded with exertion x 4 days. Pt had left and right salpingectomy via laparoscopy on 6/25.

## 2021-06-30 NOTE — ED INITIAL ASSESSMENT (HPI)
Had her \"tubes out\" on Friday 6/28. Patient states she is having trouble urinating now. Complains of bloating and constipation like feeling. States she only urinated twice but they were large amounts. States it was very difficult to get started.

## 2021-06-30 NOTE — ED PROVIDER NOTES
Patient presents with:  Abdominal Pain      HPI:      Shock is a 41year old female who is 5 days post laparoscopic tubal ligation who presents for abdominal bloating, difficulty urinating, chills, body aches, and difficulty breathing.   She states she is ab Never Used    Vaping Use      Vaping Use: Never used    Substance and Sexual Activity      Alcohol use:  Yes        Alcohol/week: 1.0 standard drinks        Types: 1 Glasses of wine per week        Comment: occ      Drug use: Yes        Types: Cannabis Abused:       Sexually Abused:     ROS:   Positive for stated complaint: Abdominal bloating, shortness of breath, vaginal bleeding, and difficulty urinating. All other systems reviewed and negative except as noted above.   Constitutional and Vital Signs Re

## 2021-06-30 NOTE — TELEPHONE ENCOUNTER
Patient calling to follow up. She did not get a call or a message on her phone from Dr Chirag Branham. Please advise.

## 2021-06-30 NOTE — TELEPHONE ENCOUNTER
Called pt and left message. Await call back. If pt has any significant/severe symptoms, then pt to go to ER. Urinary symptoms may be c/w UTI. Await call back.

## 2021-06-30 NOTE — TELEPHONE ENCOUNTER
Patient name and  verified. Patient states she did not receive missed call or message from [address] [place] [address]. Patient informed of ASJ message below. Patient addiment about not going to ED for evaluation and states she would rather be seen in UC.   Advised patient

## 2021-07-01 ENCOUNTER — TELEPHONE (OUTPATIENT)
Dept: OBGYN CLINIC | Facility: CLINIC | Age: 41
End: 2021-07-01

## 2021-07-01 ENCOUNTER — TELEPHONE (OUTPATIENT)
Dept: FAMILY MEDICINE CLINIC | Facility: CLINIC | Age: 41
End: 2021-07-01

## 2021-07-01 NOTE — ED PROVIDER NOTES
Patient Seen in: [facility] Emergency Department      History   Patient presents with:  Urinary Symptoms    Stated Complaint: sent azael ic, abd pain post surgery     HPI/Subjective:   41yo/f with hx of anxiety, psoriasis, chronic UTI reports wi Yes      Alcohol/week: 1.0 standard drinks      Types: 1 Glasses of wine per week      Comment: occ    Drug use: Yes      Types: Cannabis             Review of Systems   All other systems reviewed and are negative.       Positive for stated complaint: sent GCS: GCS eye subscore is 4. GCS verbal subscore is 5. GCS motor subscore is 6.       Gait: Gait normal.             ED Course     Labs Reviewed   URINALYSIS WITH CULTURE REFLEX - Abnormal; Notable for the following components:       Result Value    Clarity largest at the right upper renal pole measuring    5 mm.  Compensatory hypertrophy of the left kidney again noted.  The previously seen nonobstructive calculi of the left kidney are not visualized which could be related to interval resolution or the fact th    Impression  CONCLUSION:       1.  Markedly prominent bilateral periuterine and adnexal vessels with early filling of both gonadal veins.  Findings can be seen in the setting of pelvic congestion syndrome, in the appropriate clinical context.  A simple

## 2021-07-01 NOTE — ED QUICK NOTES
Patient safe to DC home per MD. Ashley Rodriguez to dress self. DC teaching done, instructions reviewed with patient including when and how to follow up with healthcare providers and when to seek emergency care. The patient verbalizes understanding.  Peripheral IV disc

## 2021-07-01 NOTE — TELEPHONE ENCOUNTER
Patient wanted to let Dr Darrel Evans know that she was seen in the ER last night for a severe UTI, bladder and kidney infection. Her post op appointment is scheduled for 7/8.   She would like to know if Dr Darrel Evans thinks she should see a urologist, Levi Evans

## 2021-07-01 NOTE — TELEPHONE ENCOUNTER
Patient was seen in the ED on Friday and was informed to follow up within 2 days. Patient is ok with virtual appointment, but requesting only Dr. Quinn Lopez, no openings. Please [facility] [phone][facility]

## 2021-07-02 NOTE — TELEPHONE ENCOUNTER
See my other note,  I spoke with pt at length the day after and she is feeling much better after she started her abx. Pt given dr Roro Calloway name and number and she is calling them for an appt.

## 2021-07-07 ENCOUNTER — TELEPHONE (OUTPATIENT)
Dept: OBGYN CLINIC | Facility: CLINIC | Age: 41
End: 2021-07-07

## 2021-07-07 NOTE — TELEPHONE ENCOUNTER
Due to a change in Dr Hollie Zaragoza availability and the patient's work schedule, her post op follow up appointment was moved a few weeks to 7/27. She would like to verify that this is ok. Please call.

## 2021-07-07 NOTE — TELEPHONE ENCOUNTER
Patient name and  verified. Patient with a history of laparoscopic R salpingectomy on 2021 with  ASJ. Patient complaining of light bleeding that began last night. Patient states she has only needed to change 2 pads since last night. Currently bleeding is light and having minimal cramping that is relieved with Aleve. Advised patient to monitor for heavy bleeding and if soaking a pad and changing every hour patient to ED for evaluation. Routing to ASJ for further recommendations.

## 2021-07-07 NOTE — TELEPHONE ENCOUNTER
Please inform that it may possibly be her period. Agree with advice to call immediately for any abnormal /heavy bleeding or any other problem.

## 2021-07-12 ENCOUNTER — OFFICE VISIT (OUTPATIENT)
Dept: FAMILY MEDICINE CLINIC | Facility: CLINIC | Age: 41
End: 2021-07-12
Payer: COMMERCIAL

## 2021-07-12 VITALS
HEART RATE: 88 BPM | BODY MASS INDEX: 22.73 KG/M2 | SYSTOLIC BLOOD PRESSURE: 104 MMHG | DIASTOLIC BLOOD PRESSURE: 67 MMHG | TEMPERATURE: 99 F | WEIGHT: 150 LBS | HEIGHT: 68 IN

## 2021-07-12 DIAGNOSIS — R14.0 ABDOMINAL BLOATING: ICD-10-CM

## 2021-07-12 DIAGNOSIS — B96.20 E. COLI UTI: Primary | ICD-10-CM

## 2021-07-12 DIAGNOSIS — N26.1 ATROPHIC KIDNEY: ICD-10-CM

## 2021-07-12 DIAGNOSIS — N20.0 RIGHT NEPHROLITHIASIS: ICD-10-CM

## 2021-07-12 DIAGNOSIS — N94.89 PELVIC CONGESTION SYNDROME: ICD-10-CM

## 2021-07-12 DIAGNOSIS — N93.9 VAGINAL BLEEDING: ICD-10-CM

## 2021-07-12 DIAGNOSIS — N39.0 E. COLI UTI: Primary | ICD-10-CM

## 2021-07-12 DIAGNOSIS — M54.9 CVA TENDERNESS: ICD-10-CM

## 2021-07-12 DIAGNOSIS — Z90.79 STATUS POST BILATERAL SALPINGECTOMY: ICD-10-CM

## 2021-07-12 PROCEDURE — 3008F BODY MASS INDEX DOCD: CPT | Performed by: FAMILY MEDICINE

## 2021-07-12 PROCEDURE — 3074F SYST BP LT 130 MM HG: CPT | Performed by: FAMILY MEDICINE

## 2021-07-12 PROCEDURE — 99214 OFFICE O/P EST MOD 30 MIN: CPT | Performed by: FAMILY MEDICINE

## 2021-07-12 PROCEDURE — 3078F DIAST BP <80 MM HG: CPT | Performed by: FAMILY MEDICINE

## 2021-07-12 RX ORDER — LEVOFLOXACIN 250 MG/1
250 TABLET ORAL DAILY
Qty: 7 TABLET | Refills: 0 | Status: SHIPPED | OUTPATIENT
Start: 2021-07-12 | End: 2021-08-25

## 2021-07-12 NOTE — TELEPHONE ENCOUNTER
The patient was called and appointment was made.     Future Appointments   Date Time Provider Dinah Gonzalez   7/12/2021  3:45 PM Sarahi [place] MD [facility]   7/14/2021  4:00 PM Cecilio Wood MD [facility] DR [facility]I FOR[facility] [place]   7/14/2021  4:30 PM

## 2021-07-12 NOTE — PROGRESS NOTES
HPI:    Patient ID: Stefany Chavez is a 41year old female.     HPI  Patient presents with:  ER F/U: pt states she still has right lower back pain and still having vaginal bleeding sometimes has to change pads every hour starts light and then hevay     Mary Jo follow-up with gynecology in the next few days. She has been having some chills on and off and complains of left-sided back pain. She completed her last dose of antibiotic this morning. [place] nausea vomiting. Denies any burning with urination.   She Breath sounds: Normal breath sounds. Abdominal:      General: There is no distension. Palpations: There is no mass. Tenderness: There is no abdominal tenderness. There is left CVA tenderness.  There is no right CVA tenderness, guarding or reboun

## 2021-07-14 ENCOUNTER — OFFICE VISIT (OUTPATIENT)
Dept: OBGYN CLINIC | Facility: CLINIC | Age: 41
End: 2021-07-14
Payer: COMMERCIAL

## 2021-07-14 VITALS
WEIGHT: 151 LBS | SYSTOLIC BLOOD PRESSURE: 106 MMHG | DIASTOLIC BLOOD PRESSURE: 72 MMHG | HEART RATE: 82 BPM | BODY MASS INDEX: 23 KG/M2

## 2021-07-14 DIAGNOSIS — N92.6 IRREGULAR MENSES: ICD-10-CM

## 2021-07-14 DIAGNOSIS — N26.1 ATROPHIC KIDNEY: ICD-10-CM

## 2021-07-14 DIAGNOSIS — N94.89 PELVIC CONGESTION SYNDROME: Primary | ICD-10-CM

## 2021-07-14 PROCEDURE — 3074F SYST BP LT 130 MM HG: CPT | Performed by: OBSTETRICS & GYNECOLOGY

## 2021-07-14 PROCEDURE — 99214 OFFICE O/P EST MOD 30 MIN: CPT | Performed by: OBSTETRICS & GYNECOLOGY

## 2021-07-14 PROCEDURE — 3078F DIAST BP <80 MM HG: CPT | Performed by: OBSTETRICS & GYNECOLOGY

## 2021-07-14 NOTE — PROGRESS NOTES
HPI:   Rosalba Alas is a 41year old female who presents for a    1) hx of pelvic congestion syndrome on ct scan in the er.   Pt stated she has had symptoms of feeling pelvic heaviness for about 11 years every since the birth of her daughter and it is agg Diagnosis Date   • Anxiety    • Anxiety state, unspecified    • Chronic UTI     Chronic UTI, congenital anomaly   • Contraception 2011    IUD   • Hyperthyroidism    • Kidney stones    • Medullary sponge kidney    • Pregnancy 2007    Pregnancy; Management pain on exertion  GI: denies abdominal pain,denies heartburn  : denies dysuria, vaginal discharge or itching,periods regular   MUSCULOSKELETAL: denies back pain  NEURO: denies headaches  PSYCHE: denies depression or anxiety  HEMATOLOGIC: denies hx of ane appt this month. Pt now notes that she has multiple family members with urinary tract problems. The patient is asked to return for an annual visit.

## 2021-07-20 ENCOUNTER — OFFICE VISIT (OUTPATIENT)
Dept: SURGERY | Facility: CLINIC | Age: 41
End: 2021-07-20
Payer: COMMERCIAL

## 2021-07-20 VITALS — HEART RATE: 66 BPM | DIASTOLIC BLOOD PRESSURE: 80 MMHG | SYSTOLIC BLOOD PRESSURE: 119 MMHG

## 2021-07-20 DIAGNOSIS — N20.0 KIDNEY STONE: ICD-10-CM

## 2021-07-20 DIAGNOSIS — N26.1 ATROPHIC KIDNEY: Primary | ICD-10-CM

## 2021-07-20 DIAGNOSIS — N39.0 RECURRENT UTI: ICD-10-CM

## 2021-07-20 LAB
BILIRUB UR QL: NEGATIVE
CLARITY UR: CLEAR
COLOR UR: COLORLESS
GLUCOSE UR-MCNC: NEGATIVE MG/DL
HGB UR QL STRIP.AUTO: NEGATIVE
KETONES UR-MCNC: NEGATIVE MG/DL
LEUKOCYTE ESTERASE UR QL STRIP.AUTO: NEGATIVE
NITRITE UR QL STRIP.AUTO: NEGATIVE
PH UR: 8 [PH] (ref 5–8)
PROT UR-MCNC: NEGATIVE MG/DL
SP GR UR STRIP: 1 (ref 1–1.03)
UROBILINOGEN UR STRIP-ACNC: <2

## 2021-07-20 PROCEDURE — 3074F SYST BP LT 130 MM HG: CPT | Performed by: NURSE PRACTITIONER

## 2021-07-20 PROCEDURE — 3079F DIAST BP 80-89 MM HG: CPT | Performed by: NURSE PRACTITIONER

## 2021-07-20 PROCEDURE — 99243 OFF/OP CNSLTJ NEW/EST LOW 30: CPT | Performed by: NURSE PRACTITIONER

## 2021-07-20 NOTE — PROGRESS NOTES
HPI/Subjective:     Patient ID: Kevin Turner is a 41year old female. HPI     Patient is a 41year old female who presents to the clinic for a consult regarding atrophic right kidney. Past medical history of kidney stones and vesicoureteral reflux. age of 37, she is unsure of the contributing factors to his renal failure. He has since passed away. + history of smoking  No excessive alcohol use  She is . She has 2 children.     History/Other:   Review of Systems   Pertinent positives and bowel disease      Social History: Social History    Tobacco Use      Smoking status: Current Every Day Smoker        Packs/day: 0.50        Years: 10.00        Pack years: 5        Types: Cigarettes      Smokeless tobacco: Never Used    Vaping Use      Va Urine Culture, Routine      Meds This Visit:  Requested Prescriptions      No prescriptions requested or ordered in this encounter       Imaging & Referrals:  NM RENAL WITH LASIX  (CPT=78708)

## 2021-08-04 ENCOUNTER — HOSPITAL ENCOUNTER (OUTPATIENT)
Dept: NUCLEAR MEDICINE | Facility: HOSPITAL | Age: 41
Discharge: HOME | End: 2021-08-04
Attending: NURSE PRACTITIONER
Payer: COMMERCIAL

## 2021-08-04 DIAGNOSIS — N26.1 ATROPHIC KIDNEY: ICD-10-CM

## 2021-08-04 PROCEDURE — 78708 K FLOW/FUNCT IMAGE W/DRUG: CPT | Performed by: NURSE PRACTITIONER

## 2021-08-04 RX ORDER — FUROSEMIDE 10 MG/ML
40 INJECTION INTRAMUSCULAR; INTRAVENOUS ONCE
Status: COMPLETED | OUTPATIENT
Start: 2021-08-04 | End: 2021-08-04

## 2021-08-04 RX ORDER — FUROSEMIDE 10 MG/ML
INJECTION INTRAMUSCULAR; INTRAVENOUS
Status: COMPLETED
Start: 2021-08-04 | End: 2021-08-04

## 2021-08-04 RX ADMIN — FUROSEMIDE 40 MG: 10 INJECTION INTRAMUSCULAR; INTRAVENOUS at 10:25:00

## 2021-08-04 NOTE — IMAGING NOTE
PT HERE FOR [facility] MED RENAL SCAN , PT TO RECEIVE IVP LASIX FOR SCAN  ALLERGIES AND  CONFIRMED WITH PT ON TABLE  LASIX 40 MG GIVEN IVP AT 1025    LOT # [id number]  EXP

## 2021-08-25 ENCOUNTER — OFFICE VISIT (OUTPATIENT)
Dept: SURGERY | Facility: CLINIC | Age: 41
End: 2021-08-25
Payer: COMMERCIAL

## 2021-08-25 VITALS — DIASTOLIC BLOOD PRESSURE: 80 MMHG | HEART RATE: 68 BPM | SYSTOLIC BLOOD PRESSURE: 115 MMHG

## 2021-08-25 DIAGNOSIS — N20.0 RIGHT NEPHROLITHIASIS: ICD-10-CM

## 2021-08-25 DIAGNOSIS — N26.1 ATROPHIC KIDNEY: Primary | ICD-10-CM

## 2021-08-25 PROCEDURE — 3079F DIAST BP 80-89 MM HG: CPT | Performed by: UROLOGY

## 2021-08-25 PROCEDURE — 99215 OFFICE O/P EST HI 40 MIN: CPT | Performed by: UROLOGY

## 2021-08-25 PROCEDURE — 3074F SYST BP LT 130 MM HG: CPT | Performed by: UROLOGY

## 2021-08-25 NOTE — PROGRESS NOTES
[facility] Urology  Initial Office Consultation    HPI:   Piper Jones is a 41year old female here today for consultation at the request of, and a copy of this note will be sent to, Luis Antonio [place] MD.    1. Atrophic right kidney  2.  Nephrolithiasis Active smoker of about 0.25 packs/day for the past 20 years. She drinks alcohol socially. She works in the office.      Family History   Problem Relation Age of Onset   • Thyroid Disorder Mother    • Hypertension Father    • Kidney Disease Father IV Contrast (6/30/21):     1.  Markedly prominent bilateral periuterine and adnexal vessels with early filling of both gonadal veins.  Findings can be seen in the setting of pelvic congestion syndrome, in the appropriate clinical context.  A simple appearin watchful waiting at this point. Patient verbalized understanding. All questions answered. PLAN:  1. No further urological work-up or intervention indicated at this time.     2.  Watchful waiting on asymptomatic, nonobstructing, small right kidney

## 2021-09-18 ENCOUNTER — HOSPITAL ENCOUNTER (OUTPATIENT)
Dept: MAMMOGRAPHY | Age: 41
Discharge: HOME | End: 2021-09-18
Attending: OBSTETRICS & GYNECOLOGY
Payer: COMMERCIAL

## 2021-09-18 DIAGNOSIS — Z12.31 ENCOUNTER FOR SCREENING MAMMOGRAM FOR MALIGNANT NEOPLASM OF BREAST: ICD-10-CM

## 2021-09-18 PROCEDURE — 77063 BREAST TOMOSYNTHESIS BI: CPT | Performed by: OBSTETRICS & GYNECOLOGY

## 2021-09-18 PROCEDURE — 77067 SCR MAMMO BI INCL CAD: CPT | Performed by: OBSTETRICS & GYNECOLOGY

## 2021-09-28 ENCOUNTER — HOSPITAL ENCOUNTER (OUTPATIENT)
Dept: MAMMOGRAPHY | Facility: HOSPITAL | Age: 41
Discharge: HOME | End: 2021-09-28
Attending: OBSTETRICS & GYNECOLOGY
Payer: COMMERCIAL

## 2021-09-28 ENCOUNTER — HOSPITAL ENCOUNTER (OUTPATIENT)
Dept: ULTRASOUND IMAGING | Facility: HOSPITAL | Age: 41
Discharge: HOME | End: 2021-09-28
Attending: OBSTETRICS & GYNECOLOGY
Payer: COMMERCIAL

## 2021-09-28 DIAGNOSIS — R92.8 ABNORMAL MAMMOGRAM: ICD-10-CM

## 2021-09-28 DIAGNOSIS — R92.2 INCONCLUSIVE MAMMOGRAM: ICD-10-CM

## 2021-09-28 PROCEDURE — 77061 BREAST TOMOSYNTHESIS UNI: CPT | Performed by: OBSTETRICS & GYNECOLOGY

## 2021-09-28 PROCEDURE — 76642 ULTRASOUND BREAST LIMITED: CPT | Performed by: OBSTETRICS & GYNECOLOGY

## 2021-09-28 PROCEDURE — 77065 DX MAMMO INCL CAD UNI: CPT | Performed by: OBSTETRICS & GYNECOLOGY

## 2022-09-26 ENCOUNTER — OFFICE VISIT (OUTPATIENT)
Dept: OBGYN CLINIC | Facility: CLINIC | Age: 42
End: 2022-09-26

## 2022-09-26 ENCOUNTER — HOSPITAL ENCOUNTER (OUTPATIENT)
Dept: ULTRASOUND IMAGING | Facility: HOSPITAL | Age: 42
Discharge: HOME | End: 2022-09-26
Attending: OBSTETRICS & GYNECOLOGY

## 2022-09-26 VITALS
SYSTOLIC BLOOD PRESSURE: 119 MMHG | DIASTOLIC BLOOD PRESSURE: 83 MMHG | WEIGHT: 142.19 LBS | HEART RATE: 95 BPM | BODY MASS INDEX: 22 KG/M2

## 2022-09-26 DIAGNOSIS — R10.2 PELVIC PAIN: Primary | ICD-10-CM

## 2022-09-26 DIAGNOSIS — R10.2 PELVIC PAIN: ICD-10-CM

## 2022-09-26 LAB
BILIRUB UR QL: NEGATIVE
CLARITY UR: CLEAR
COLOR UR: YELLOW
GLUCOSE UR-MCNC: NEGATIVE MG/DL
KETONES UR-MCNC: NEGATIVE MG/DL
NITRITE UR QL STRIP.AUTO: POSITIVE
PH UR: 6.5 [PH] (ref 5–8)
PROT UR-MCNC: NEGATIVE MG/DL
RBC #/AREA URNS AUTO: >10 /HPF
RBC #/AREA URNS AUTO: >10 /HPF
SP GR UR STRIP: 1.02 (ref 1–1.03)
UROBILINOGEN UR STRIP-ACNC: 0.2

## 2022-09-26 PROCEDURE — 81015 MICROSCOPIC EXAM OF URINE: CPT | Performed by: OBSTETRICS & GYNECOLOGY

## 2022-09-26 PROCEDURE — 87086 URINE CULTURE/COLONY COUNT: CPT | Performed by: OBSTETRICS & GYNECOLOGY

## 2022-09-26 PROCEDURE — 76830 TRANSVAGINAL US NON-OB: CPT | Performed by: OBSTETRICS & GYNECOLOGY

## 2022-09-26 PROCEDURE — 81001 URINALYSIS AUTO W/SCOPE: CPT | Performed by: OBSTETRICS & GYNECOLOGY

## 2022-09-26 PROCEDURE — 76856 US EXAM PELVIC COMPLETE: CPT | Performed by: OBSTETRICS & GYNECOLOGY

## 2022-09-27 ENCOUNTER — TELEMEDICINE (OUTPATIENT)
Dept: OBGYN CLINIC | Facility: CLINIC | Age: 42
End: 2022-09-27

## 2022-09-27 DIAGNOSIS — R10.2 PELVIC PAIN: Primary | ICD-10-CM

## 2022-09-27 DIAGNOSIS — N30.00 ACUTE CYSTITIS WITHOUT HEMATURIA: ICD-10-CM

## 2022-09-27 PROCEDURE — 99213 OFFICE O/P EST LOW 20 MIN: CPT | Performed by: OBSTETRICS & GYNECOLOGY

## 2022-09-27 RX ORDER — NITROFURANTOIN 25; 75 MG/1; MG/1
100 CAPSULE ORAL 2 TIMES DAILY
Qty: 14 CAPSULE | Refills: 0 | Status: SHIPPED | OUTPATIENT
Start: 2022-09-27 | End: 2022-10-04

## 2022-09-30 ENCOUNTER — TELEPHONE (OUTPATIENT)
Dept: OBGYN CLINIC | Facility: CLINIC | Age: 42
End: 2022-09-30

## 2022-09-30 NOTE — TELEPHONE ENCOUNTER
----- Message from Jenny Addison MD sent at 9/30/2022  6:38 AM CDT -----  Please notify patient that her E. coli UTI was sensitive to nitrofurantoin/Macrobid which I had previously placed her on. I have placed an order for a repeat urine culture which is to be done in 4 to 8 weeks.   Result noted

## 2022-12-27 ENCOUNTER — NURSE TRIAGE (OUTPATIENT)
Dept: FAMILY MEDICINE CLINIC | Facility: CLINIC | Age: 42
End: 2022-12-27

## 2022-12-29 ENCOUNTER — OFFICE VISIT (OUTPATIENT)
Dept: FAMILY MEDICINE CLINIC | Facility: CLINIC | Age: 42
End: 2022-12-29
Payer: COMMERCIAL

## 2022-12-29 VITALS
HEIGHT: 68 IN | WEIGHT: 146 LBS | DIASTOLIC BLOOD PRESSURE: 60 MMHG | BODY MASS INDEX: 22.13 KG/M2 | TEMPERATURE: 98 F | SYSTOLIC BLOOD PRESSURE: 110 MMHG | OXYGEN SATURATION: 96 % | RESPIRATION RATE: 20 BRPM | HEART RATE: 75 BPM

## 2022-12-29 DIAGNOSIS — K64.9 HEMORRHOIDS, UNSPECIFIED HEMORRHOID TYPE: Primary | ICD-10-CM

## 2022-12-29 PROCEDURE — 3074F SYST BP LT 130 MM HG: CPT | Performed by: FAMILY MEDICINE

## 2022-12-29 PROCEDURE — 97810 ACUP 1/> WO ESTIM 1ST 15 MIN: CPT | Performed by: FAMILY MEDICINE

## 2022-12-29 PROCEDURE — 3078F DIAST BP <80 MM HG: CPT | Performed by: FAMILY MEDICINE

## 2022-12-29 PROCEDURE — 99214 OFFICE O/P EST MOD 30 MIN: CPT | Performed by: FAMILY MEDICINE

## 2022-12-29 PROCEDURE — 3008F BODY MASS INDEX DOCD: CPT | Performed by: FAMILY MEDICINE

## 2022-12-29 RX ORDER — HYDROCORTISONE ACETATE 25 MG/1
25 SUPPOSITORY RECTAL 2 TIMES DAILY
Qty: 28 SUPPOSITORY | Refills: 0 | Status: SHIPPED | OUTPATIENT
Start: 2022-12-29 | End: 2023-01-12

## 2022-12-29 NOTE — PROCEDURES
Patient tolerated procedure well in excess of 30 minutes was spent with patient   There was no complications all needles were removed.    Patient was advised to rest today and f/u in 1-2 weeks  Gv28, gv20, oketsu   Ear points gv4

## 2023-01-04 ENCOUNTER — TELEPHONE (OUTPATIENT)
Dept: FAMILY MEDICINE CLINIC | Facility: CLINIC | Age: 43
End: 2023-01-04

## 2023-01-04 RX ORDER — HYDROCORTISONE 25 MG/G
1 CREAM TOPICAL 2 TIMES DAILY
Qty: 1 EACH | Refills: 2 | Status: SHIPPED | OUTPATIENT
Start: 2023-01-04

## 2023-01-06 ENCOUNTER — HOSPITAL ENCOUNTER (OUTPATIENT)
Age: 43
Discharge: HOME | End: 2023-01-06
Payer: COMMERCIAL

## 2023-01-06 VITALS
RESPIRATION RATE: 18 BRPM | HEART RATE: 98 BPM | WEIGHT: 145 LBS | TEMPERATURE: 98 F | DIASTOLIC BLOOD PRESSURE: 69 MMHG | HEIGHT: 68 IN | SYSTOLIC BLOOD PRESSURE: 115 MMHG | BODY MASS INDEX: 21.98 KG/M2 | OXYGEN SATURATION: 99 %

## 2023-01-06 DIAGNOSIS — U07.1 COVID-19: Primary | ICD-10-CM

## 2023-01-06 LAB
S PYO AG THROAT QL: NEGATIVE
SARS-COV-2 RNA RESP QL NAA+PROBE: DETECTED

## 2023-01-06 PROCEDURE — 99213 OFFICE O/P EST LOW 20 MIN: CPT | Performed by: NURSE PRACTITIONER

## 2023-01-06 PROCEDURE — U0002 COVID-19 LAB TEST NON-CDC: HCPCS | Performed by: NURSE PRACTITIONER

## 2023-01-06 PROCEDURE — 87880 STREP A ASSAY W/OPTIC: CPT | Performed by: NURSE PRACTITIONER

## 2023-07-31 ENCOUNTER — HOSPITAL ENCOUNTER (OUTPATIENT)
Age: 43
Discharge: HOME | End: 2023-07-31
Payer: COMMERCIAL

## 2023-07-31 VITALS
RESPIRATION RATE: 16 BRPM | SYSTOLIC BLOOD PRESSURE: 116 MMHG | DIASTOLIC BLOOD PRESSURE: 77 MMHG | HEART RATE: 66 BPM | TEMPERATURE: 98 F | OXYGEN SATURATION: 99 %

## 2023-07-31 DIAGNOSIS — N30.90 CYSTITIS: Primary | ICD-10-CM

## 2023-07-31 LAB
B-HCG UR QL: NEGATIVE
BILIRUB UR QL STRIP: NEGATIVE
GLUCOSE UR STRIP-MCNC: 100 MG/DL
KETONES UR STRIP-MCNC: NEGATIVE MG/DL
NITRITE UR QL STRIP: POSITIVE
PH UR STRIP: 6 [PH]
PROT UR STRIP-MCNC: NEGATIVE MG/DL
SP GR UR STRIP: 1.01
UROBILINOGEN UR STRIP-ACNC: <2 MG/DL

## 2023-07-31 PROCEDURE — 87086 URINE CULTURE/COLONY COUNT: CPT | Performed by: NURSE PRACTITIONER

## 2023-07-31 PROCEDURE — 81002 URINALYSIS NONAUTO W/O SCOPE: CPT | Performed by: NURSE PRACTITIONER

## 2023-07-31 PROCEDURE — 81025 URINE PREGNANCY TEST: CPT | Performed by: NURSE PRACTITIONER

## 2023-07-31 PROCEDURE — 87088 URINE BACTERIA CULTURE: CPT | Performed by: NURSE PRACTITIONER

## 2023-07-31 PROCEDURE — 87186 SC STD MICRODIL/AGAR DIL: CPT | Performed by: NURSE PRACTITIONER

## 2023-07-31 PROCEDURE — 99213 OFFICE O/P EST LOW 20 MIN: CPT | Performed by: NURSE PRACTITIONER

## 2023-07-31 RX ORDER — CEPHALEXIN 500 MG/1
500 CAPSULE ORAL 2 TIMES DAILY
Qty: 14 CAPSULE | Refills: 0 | Status: SHIPPED | OUTPATIENT
Start: 2023-07-31 | End: 2023-08-07

## 2023-07-31 RX ORDER — FLUCONAZOLE 150 MG/1
150 TABLET ORAL ONCE
Qty: 1 TABLET | Refills: 0 | Status: SHIPPED | OUTPATIENT
Start: 2023-07-31 | End: 2023-07-31

## 2023-07-31 NOTE — DISCHARGE INSTRUCTIONS
Take medication as prescribed. Close follow-up with primary care provider is recommended. Any fever, chills, flank pain please go to the emergency department.

## 2023-10-24 ENCOUNTER — HOSPITAL ENCOUNTER (OUTPATIENT)
Age: 43
Discharge: HOME | End: 2023-10-24

## 2023-10-24 VITALS
DIASTOLIC BLOOD PRESSURE: 83 MMHG | RESPIRATION RATE: 20 BRPM | HEIGHT: 68 IN | OXYGEN SATURATION: 98 % | BODY MASS INDEX: 22 KG/M2 | HEART RATE: 68 BPM | SYSTOLIC BLOOD PRESSURE: 124 MMHG | TEMPERATURE: 98 F

## 2023-10-24 DIAGNOSIS — H66.002 NON-RECURRENT ACUTE SUPPURATIVE OTITIS MEDIA OF LEFT EAR WITHOUT SPONTANEOUS RUPTURE OF TYMPANIC MEMBRANE: ICD-10-CM

## 2023-10-24 DIAGNOSIS — N30.00 ACUTE CYSTITIS WITHOUT HEMATURIA: Primary | ICD-10-CM

## 2023-10-24 LAB
B-HCG UR QL: NEGATIVE
BILIRUB UR QL STRIP: NEGATIVE
COLOR UR: YELLOW
GLUCOSE UR STRIP-MCNC: NEGATIVE MG/DL
HGB UR QL STRIP: NEGATIVE
KETONES UR STRIP-MCNC: NEGATIVE MG/DL
NITRITE UR QL STRIP: NEGATIVE
PH UR STRIP: 7 [PH]
PROT UR STRIP-MCNC: NEGATIVE MG/DL
SP GR UR STRIP: 1.01
UROBILINOGEN UR STRIP-ACNC: <2 MG/DL

## 2023-10-24 PROCEDURE — 87186 SC STD MICRODIL/AGAR DIL: CPT | Performed by: NURSE PRACTITIONER

## 2023-10-24 PROCEDURE — 87086 URINE CULTURE/COLONY COUNT: CPT | Performed by: NURSE PRACTITIONER

## 2023-10-24 PROCEDURE — 81002 URINALYSIS NONAUTO W/O SCOPE: CPT | Performed by: NURSE PRACTITIONER

## 2023-10-24 PROCEDURE — 87088 URINE BACTERIA CULTURE: CPT | Performed by: NURSE PRACTITIONER

## 2023-10-24 PROCEDURE — 81025 URINE PREGNANCY TEST: CPT | Performed by: NURSE PRACTITIONER

## 2023-10-24 PROCEDURE — 99214 OFFICE O/P EST MOD 30 MIN: CPT | Performed by: NURSE PRACTITIONER

## 2023-10-24 RX ORDER — CEPHALEXIN 500 MG/1
500 CAPSULE ORAL 2 TIMES DAILY
Qty: 20 CAPSULE | Refills: 0 | Status: SHIPPED | OUTPATIENT
Start: 2023-10-24 | End: 2023-11-03

## 2023-10-24 RX ORDER — FLUCONAZOLE 150 MG/1
150 TABLET ORAL ONCE
Qty: 1 TABLET | Refills: 0 | Status: SHIPPED | OUTPATIENT
Start: 2023-10-24 | End: 2023-10-24

## 2023-10-24 NOTE — DISCHARGE INSTRUCTIONS
As discussed, suspected UTI and left ear infection. Antibiotics of Keflex prescribed, twice a day for 10 days. Sleep somewhat elevated upright. Avoid getting water in ear. Take Tylenol before hours Motrin 6 hours. Do not use Q-tips or pour anything into ear. Urine sent for culture, results will be available in 48 hours, so we will contact you antibiotics when changing. If you have any new or worsening symptoms such as inability urinate, blood in urine, abdominal pain, pelvic pain, flank pain, back pain, fevers, nausea, vomiting, please go to ER.

## 2023-10-24 NOTE — ED INITIAL ASSESSMENT (HPI)
Pt c/o left ear pain, concerned for infection. Pt c/o lower back pain, hx frequent uti's (this is her symptom).  Last uti august

## 2023-11-13 ENCOUNTER — HOSPITAL ENCOUNTER (OUTPATIENT)
Dept: ULTRASOUND IMAGING | Age: 43
Discharge: HOME | End: 2023-11-13
Attending: INTERNAL MEDICINE
Payer: COMMERCIAL

## 2023-11-13 ENCOUNTER — LAB ENCOUNTER (OUTPATIENT)
Dept: LAB | Facility: HOSPITAL | Age: 43
End: 2023-11-13
Attending: INTERNAL MEDICINE
Payer: COMMERCIAL

## 2023-11-13 ENCOUNTER — OFFICE VISIT (OUTPATIENT)
Dept: NEPHROLOGY | Facility: CLINIC | Age: 43
End: 2023-11-13
Payer: COMMERCIAL

## 2023-11-13 VITALS
WEIGHT: 143 LBS | HEIGHT: 68 IN | BODY MASS INDEX: 21.67 KG/M2 | HEART RATE: 87 BPM | SYSTOLIC BLOOD PRESSURE: 99 MMHG | DIASTOLIC BLOOD PRESSURE: 66 MMHG

## 2023-11-13 DIAGNOSIS — Q61.5 MEDULLARY SPONGE KIDNEY: Primary | ICD-10-CM

## 2023-11-13 DIAGNOSIS — Q61.5 MEDULLARY SPONGE KIDNEY: ICD-10-CM

## 2023-11-13 DIAGNOSIS — E05.90 HYPERTHYROIDISM: ICD-10-CM

## 2023-11-13 DIAGNOSIS — R82.90 ABNORMAL URINALYSIS: ICD-10-CM

## 2023-11-13 DIAGNOSIS — Z13.220 SCREENING FOR LIPID DISORDERS: ICD-10-CM

## 2023-11-13 DIAGNOSIS — N20.0 NEPHROLITHIASIS: ICD-10-CM

## 2023-11-13 LAB
ALBUMIN SERPL-MCNC: 4.5 G/DL (ref 3.2–4.8)
ALBUMIN/GLOB SERPL: 1.5 {RATIO} (ref 1–2)
ALP LIVER SERPL-CCNC: 65 U/L
ALT SERPL-CCNC: 10 U/L
ANION GAP SERPL CALC-SCNC: 6 MMOL/L (ref 0–18)
AST SERPL-CCNC: 13 U/L (ref ?–34)
BASOPHILS # BLD AUTO: 0.05 X10(3) UL (ref 0–0.2)
BASOPHILS NFR BLD AUTO: 0.7 %
BILIRUB SERPL-MCNC: 0.4 MG/DL (ref 0.3–1.2)
BILIRUB UR QL: NEGATIVE
BUN BLD-MCNC: 9 MG/DL (ref 9–23)
BUN/CREAT SERPL: 13.2 (ref 10–20)
CALCIUM BLD-MCNC: 9.7 MG/DL (ref 8.7–10.4)
CHLORIDE SERPL-SCNC: 106 MMOL/L (ref 98–112)
CHOLEST SERPL-MCNC: 140 MG/DL (ref ?–200)
CO2 SERPL-SCNC: 26 MMOL/L (ref 21–32)
COLOR UR: YELLOW
CREAT BLD-MCNC: 0.68 MG/DL
CREAT UR-SCNC: 111.9 MG/DL
DEPRECATED RDW RBC AUTO: 40.1 FL (ref 35.1–46.3)
EGFRCR SERPLBLD CKD-EPI 2021: 109 ML/MIN/1.73M2 (ref 60–?)
EOSINOPHIL # BLD AUTO: 0.16 X10(3) UL (ref 0–0.7)
EOSINOPHIL NFR BLD AUTO: 2.3 %
ERYTHROCYTE [DISTWIDTH] IN BLOOD BY AUTOMATED COUNT: 12 % (ref 11–15)
FASTING PATIENT LIPID ANSWER: NO
FASTING STATUS PATIENT QL REPORTED: NO
GLOBULIN PLAS-MCNC: 3 G/DL (ref 2.8–4.4)
GLUCOSE BLD-MCNC: 94 MG/DL (ref 70–99)
GLUCOSE UR-MCNC: NORMAL MG/DL
HCT VFR BLD AUTO: 41.2 %
HDLC SERPL-MCNC: 46 MG/DL (ref 40–59)
HGB BLD-MCNC: 13.5 G/DL
IMM GRANULOCYTES # BLD AUTO: 0.01 X10(3) UL (ref 0–1)
IMM GRANULOCYTES NFR BLD: 0.1 %
KETONES UR-MCNC: NEGATIVE MG/DL
LDLC SERPL CALC-MCNC: 82 MG/DL (ref ?–100)
LEUKOCYTE ESTERASE UR QL STRIP.AUTO: 250
LYMPHOCYTES # BLD AUTO: 2.04 X10(3) UL (ref 1–4)
LYMPHOCYTES NFR BLD AUTO: 29.2 %
MCH RBC QN AUTO: 29.7 PG (ref 26–34)
MCHC RBC AUTO-ENTMCNC: 32.8 G/DL (ref 31–37)
MCV RBC AUTO: 90.5 FL
MICROALBUMIN UR-MCNC: 6.8 MG/DL
MICROALBUMIN/CREAT 24H UR-RTO: 60.8 UG/MG (ref ?–30)
MONOCYTES # BLD AUTO: 0.55 X10(3) UL (ref 0.1–1)
MONOCYTES NFR BLD AUTO: 7.9 %
NEUTROPHILS # BLD AUTO: 4.17 X10 (3) UL (ref 1.5–7.7)
NEUTROPHILS # BLD AUTO: 4.17 X10(3) UL (ref 1.5–7.7)
NEUTROPHILS NFR BLD AUTO: 59.8 %
NONHDLC SERPL-MCNC: 94 MG/DL (ref ?–130)
OSMOLALITY SERPL CALC.SUM OF ELEC: 284 MOSM/KG (ref 275–295)
PH UR: 5.5 [PH] (ref 5–8)
PLATELET # BLD AUTO: 216 10(3)UL (ref 150–450)
POTASSIUM SERPL-SCNC: 4.5 MMOL/L (ref 3.5–5.1)
PROT SERPL-MCNC: 7.5 G/DL (ref 5.7–8.2)
PROT UR-MCNC: 20 MG/DL
RBC # BLD AUTO: 4.55 X10(6)UL
SODIUM SERPL-SCNC: 138 MMOL/L (ref 136–145)
SP GR UR STRIP: 1.02 (ref 1–1.03)
TRIGL SERPL-MCNC: 58 MG/DL (ref 30–149)
TSI SER-ACNC: 0.58 MIU/ML (ref 0.55–4.78)
UROBILINOGEN UR STRIP-ACNC: NORMAL
VLDLC SERPL CALC-MCNC: 9 MG/DL (ref 0–30)
WBC # BLD AUTO: 7 X10(3) UL (ref 4–11)

## 2023-11-13 PROCEDURE — 81001 URINALYSIS AUTO W/SCOPE: CPT

## 2023-11-13 PROCEDURE — 3008F BODY MASS INDEX DOCD: CPT | Performed by: INTERNAL MEDICINE

## 2023-11-13 PROCEDURE — 76770 US EXAM ABDO BACK WALL COMP: CPT | Performed by: INTERNAL MEDICINE

## 2023-11-13 PROCEDURE — 99204 OFFICE O/P NEW MOD 45 MIN: CPT | Performed by: INTERNAL MEDICINE

## 2023-11-13 PROCEDURE — 82570 ASSAY OF URINE CREATININE: CPT

## 2023-11-13 PROCEDURE — 82043 UR ALBUMIN QUANTITATIVE: CPT

## 2023-11-13 PROCEDURE — 80061 LIPID PANEL: CPT

## 2023-11-13 PROCEDURE — 3078F DIAST BP <80 MM HG: CPT | Performed by: INTERNAL MEDICINE

## 2023-11-13 PROCEDURE — 85025 COMPLETE CBC W/AUTO DIFF WBC: CPT

## 2023-11-13 PROCEDURE — 36415 COLL VENOUS BLD VENIPUNCTURE: CPT

## 2023-11-13 PROCEDURE — 80053 COMPREHEN METABOLIC PANEL: CPT | Performed by: INTERNAL MEDICINE

## 2023-11-13 PROCEDURE — 3074F SYST BP LT 130 MM HG: CPT | Performed by: INTERNAL MEDICINE

## 2023-11-13 PROCEDURE — 84443 ASSAY THYROID STIM HORMONE: CPT | Performed by: INTERNAL MEDICINE

## 2023-11-13 RX ORDER — NITROFURANTOIN 25; 75 MG/1; MG/1
100 CAPSULE ORAL 2 TIMES DAILY
Qty: 10 CAPSULE | Refills: 0 | Status: SHIPPED | OUTPATIENT
Start: 2023-11-13

## 2023-11-13 RX ORDER — FLUCONAZOLE 150 MG/1
150 TABLET ORAL ONCE
Qty: 1 TABLET | Refills: 0 | Status: SHIPPED | OUTPATIENT
Start: 2023-11-13 | End: 2023-11-13

## 2023-11-14 NOTE — PROGRESS NOTES
Nephrology Consultation Note    Reason for Consult:   Chief Complaint   Patient presents with    Consult     Medullary sponge kidney, nephrolithiasis        HPI:     37year old female with past medical history of recurrent of UTIs and reflux as a child, medullary sponge kidney, nephrolithiasis, hyperthyroidism (off meds), psoriasis (scalp), and anxiety is self referred for medullary sponge kidney and nephrolithiasis. She reports she has had UTIs and ureteral reflux as a child. When she was 11-8 yo, she had a urethral reconstruction surgery. She was thought to have dilated urethra which was causing UTIs. After the surgery, UTIs resolved until she was 12-24 yo. Then did not recur until about 2 years ago. She had a UTI in July and then again on 10/24 which was treated with Cephalexin. She reports for the last 2 months she has had intermittent dull back pain, which sometimes becomes sharp. She has had associated urinary discomfort and sometimes dysuria. Symptoms improved with treatment of UTI in October, but did not resolve. She feels a pressure in her back. She reports she passed a few stones spontaneously. She thinks in October she passed a stone. She drinks about 1-1.5L of water, and about 1L of tea and coffee. Noncontrast CT abd/pelvis (1/17/17): \"No contour deforming renal mass. Right renal parenchymal thinning. Numerous calyceal calculi throughout right kidney with the largest measuring 6.4 mm in the right upper pole. Other calculi measure between 2 and 4 mm. A 4.6 mm left lower pole renal calculus and a 2 mm left interpolar renal calculus. No ureteral calculus or obstruction. No hydroureteronephrosis or urinary tract calculus. No abnormality in the unenhanced bladder. \"    Contrast CT abd/pelvis (6/30/21): Right kidney is again atrophic compared to the left with numerous areas of peripheral cortical scarring and multiple nonobstructive renal calculi.  There are at least 10 such calculi noted with the largest at the right upper renal pole measuring 5 mm. Compensatory hypertrophy of the left kidney again noted. The previously seen nonobstructive calculi of the left kidney are not visualized which could be related to interval resolution or the fact that contrast is now obscuring them. \"    Cr has been 0.6-0.7 range since 2012: Cr 0.68 today. Denies NSAIDs use. Previous UAs have been negative for protein and have shown 3-5 rbcs, but had a UTI at the same time. UA showed 20 mg protein, trace blood, 2+ nitrite, 250 leuk est, 21-50 wbcs, and 3-5 rbcs today. She complains of back pressure and some urinary discomfort, not resolved with recent abx. Hyperthyroidism: Took methimazole in the past, but stopped due to side effects. TSH 0.582 today. Additional labs:   Hb 13.5 today  Chol 140, Trig 58, HDL 46, LDL 82 today. FHx:   Mother (A) - Kidney stones  Father (D) - HTN, ESRD on HD, s/p transplant. Was on HD for 17 years, transplant only lasted for 1 year. SHx: Smokes 1/2 PPD x 10 yrs. Drinks alcohol occasionally. Uses cannabis occasionally. Lives with her . Has 2 daughters. 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 HISTORY:  Past Medical History:   Diagnosis Date    Anxiety     Chronic UTI     Chronic UTI, congenital anomaly, s/p urethral reconstruction    Contraception     IUD    Hyperthyroidism     Was on meds briefly, off meds    Kidney stones     Medullary sponge kidney     Pregnancy     Pregnancy; Management:  4 hr labor ; Facility:  [facility]; Provider:  Phu Hammonds;  Outcome/detail:  40 week 8 lb(S) 6oz Female; Comments:  Rachel (JLK)\"    Pregnancy     Pregnancy; Management:   - ANNALEE Cuellar; Provider:  Libia Pena; Comments:  APGARS 9 (1 min), 9 (5 min) - 1st degree perineal laceration\"    Psoriasis     Vertigo     Visual impairment     contacts      Past Surgical History:   Procedure Laterality Date INCISE EXTERNAL HEMORRHOID  09/19/2013    L posterior quad    INCISE EXTERNAL HEMORRHOID  10/29/2015    R posterior quad    SKIN SURGERY Left 10/2015    wart removal    TUBAL LIGATION      UROLOGY SURGERY PROCEDURE UNLISTED  1986    Uretheral reconstruction      Family History   Problem Relation Age of Onset    Hypertension Father     Kidney Disease Father         ESRD on HD, s/p transplant    Thyroid Disorder Mother     Other (Kidney stones) Mother     Colon Cancer Other         No Family h/o Colon Cancer    Other (Inflammatory bowel disease) Other         No Family h/o Inflammatory bowel disease      Social History:   Social History     Socioeconomic History    Marital status:     Number of children: 2   Occupational History    Occupation: teacher     Comment: elementary   Tobacco Use    Smoking status: Every Day     Packs/day: 0.50     Years: 10.00     Additional pack years: 0.00     Total pack years: 5.00     Types: Cigarettes    Smokeless tobacco: Never   Vaping Use    Vaping Use: Never used   Substance and Sexual Activity    Alcohol use: Yes     Alcohol/week: 1.0 standard drink of alcohol     Types: 1 Glasses of wine per week     Comment: occasionally    Drug use: Yes     Types: Cannabis   Other Topics Concern    Caffeine Concern Yes     Comment: (Coffee, Soda) 2 cups daily    Pt has a pacemaker No    Pt has a defibrillator No    Reaction to local anesthetic No        Medications (Active prior to today's visit):  Current Outpatient Medications   Medication Sig Dispense Refill    nitrofurantoin monohydrate macro 100 MG Oral Cap Take 1 capsule (100 mg total) by mouth 2 (two) times daily. 10 capsule 0    fluconazole 150 MG Oral Tab Take 1 tablet (150 mg total) by mouth once for 1 dose. 1 tablet 0    KRILL OIL OR Take by mouth. Ascorbic Acid (VITAMIN C OR) Take by mouth. Ergocalciferol (VITAMIN D OR) Take by mouth. Allergies:   Allergies   Allergen Reactions    Methimazole [Thiamazole] HIVES    Hossein Rosenbaum.Reusing Time] ANXIETY    Sulfamethoxazole RASH    Trimethoprim RASH         ROS:     Review of Systems   Constitutional:  Negative for appetite change, chills, fatigue and fever. HENT:  Negative for ear pain, rhinorrhea, sore throat and trouble swallowing. Eyes:  Negative for pain and discharge. Respiratory:  Negative for cough, chest tightness and shortness of breath. Cardiovascular:  Negative for chest pain and leg swelling. Gastrointestinal:  Negative for abdominal pain, constipation, diarrhea and vomiting. Genitourinary:  Negative for decreased urine volume, dysuria and flank pain. Discomfort   Musculoskeletal:  Positive for back pain. Negative for arthralgias, gait problem and myalgias. Skin:  Negative for rash. Neurological:  Negative for dizziness, speech difficulty, weakness, numbness and headaches. Psychiatric/Behavioral:  Negative for agitation and confusion. Vitals:    11/13/23 1051   BP: 99/66   Pulse: 87       PHYSICAL EXAM:   Physical Exam  Constitutional:       Appearance: Normal appearance. HENT:      Head: Normocephalic and atraumatic. Nose: Nose normal.   Eyes:      General: No scleral icterus. Cardiovascular:      Rate and Rhythm: Normal rate and regular rhythm. Heart sounds: Normal heart sounds. No murmur heard. Pulmonary:      Effort: Pulmonary effort is normal.      Breath sounds: Normal breath sounds. Abdominal:      General: Bowel sounds are normal. There is no distension. Palpations: Abdomen is soft. Musculoskeletal:         General: No tenderness. Normal range of motion. Cervical back: Normal range of motion and neck supple. Comments: Neg edema   Skin:     General: Skin is warm and dry. Findings: No rash. Neurological:      General: No focal deficit present. Mental Status: She is alert and oriented to person, place, and time. Mental status is at baseline.    Psychiatric:         Mood and Affect: Mood normal.         Behavior: Behavior normal.         Thought Content: Thought content normal.             ASSESSMENT/PLAN:   Assessment   Encounter Diagnoses   Name Primary? Medullary sponge kidney Yes    Nephrolithiasis     Screening for lipid disorders     Hyperthyroidism     Abnormal urinalysis        Medullary sponge kidney and nephrolithiasis:   Repeat Cr 0.68 today. Renal US ordered. Asked to increase water intake to about 64 oz per day. 24 hour urine collection discussed and ordered for stone analysis. UTI:   UA again positive. Will treat with Nitrofurantoin 100 mg BID. Pt requested Fluconazole for a yeast infection that she gets with treatment of the UTI. Spoke to pt about lab results and UTI. Follow up in 6 months. Orders This Visit:  Orders Placed This Encounter   Procedures    Kidney Stone Urine Test Combination With Saturation Calculations    CBC With Differential With Platelet    Comp Metabolic Panel (14)    TSH W Reflex To Free T4    Urinalysis, Routine    Microalb/Creat Ratio, Random Urine    Lipid Panel       Meds This Visit:  Requested Prescriptions     Signed Prescriptions Disp Refills    nitrofurantoin monohydrate macro 100 MG Oral Cap 10 capsule 0     Sig: Take 1 capsule (100 mg total) by mouth 2 (two) times daily. fluconazole 150 MG Oral Tab 1 tablet 0     Sig: Take 1 tablet (150 mg total) by mouth once for 1 dose. Imaging & Referrals:  None       50 minutes spent in the care of the patient which included: reviewing prior records, obtaining history and examining patient, discussing lab results and treatment plan including diet, ordering medication (Nitrofurantoin), labs, and Renal US, and documentation.       Iona Vitale MD  11/13/2023

## 2023-11-17 ENCOUNTER — TELEPHONE (OUTPATIENT)
Dept: NEPHROLOGY | Facility: CLINIC | Age: 43
End: 2023-11-17

## 2023-11-17 DIAGNOSIS — N20.0 NEPHROLITHIASIS: Primary | ICD-10-CM

## 2023-11-17 NOTE — TELEPHONE ENCOUNTER
Spoke to pt about Renal US results, which showed R renal atrophy and multiple stones on right up to 9 mm. She will do 24 hour urine collection which was ordered before. Will refer to urology.

## 2023-11-26 ENCOUNTER — LAB ENCOUNTER (OUTPATIENT)
Dept: LAB | Facility: HOSPITAL | Age: 43
End: 2023-11-26
Attending: INTERNAL MEDICINE
Payer: COMMERCIAL

## 2023-11-26 DIAGNOSIS — N20.0 NEPHROLITHIASIS: ICD-10-CM

## 2023-11-26 DIAGNOSIS — Q61.5 MEDULLARY SPONGE KIDNEY: ICD-10-CM

## 2023-11-26 PROCEDURE — 82436 ASSAY OF URINE CHLORIDE: CPT

## 2023-11-26 PROCEDURE — 82507 ASSAY OF CITRATE: CPT

## 2023-11-26 PROCEDURE — 84392 ASSAY OF URINE SULFATE: CPT

## 2023-11-26 PROCEDURE — 84300 ASSAY OF URINE SODIUM: CPT

## 2023-11-26 PROCEDURE — 83945 ASSAY OF OXALATE: CPT

## 2023-11-26 PROCEDURE — 83735 ASSAY OF MAGNESIUM: CPT

## 2023-11-26 PROCEDURE — 84560 ASSAY OF URINE/URIC ACID: CPT

## 2023-11-26 PROCEDURE — 83986 ASSAY PH BODY FLUID NOS: CPT

## 2023-11-26 PROCEDURE — 82340 ASSAY OF CALCIUM IN URINE: CPT

## 2023-11-26 PROCEDURE — 84133 ASSAY OF URINE POTASSIUM: CPT

## 2023-11-26 PROCEDURE — 84105 ASSAY OF URINE PHOSPHORUS: CPT

## 2023-12-04 LAB
AMMONIA, URINE: 24 MEQ/24 HR
AMMONIA, URINE: ABNORMAL UG/DL
BRUSHITE: 4.46 RATIO
CHLORIDE URINE: 185 MMOL/24 HR
CHLORIDE, URINE: 142 MMOL/L
CITRIC ACID (CITRATE): 750 MG/L
CITRIC ACID(CITRATE): 975 MG/24 HR
CREATININE, URINE: 1264.9 MG/24 HR
CREATININE, URINE: 97.3 MG/DL
CYSTINE, QUANT, UR: 15.03 MG/24 HR
CYSTINE, QUANTITATIVE, URINE: 11.56 MG/L
LC CALCIUM OXALATE: 7.89 RATIO
LC CALCIUM, URINE: 27.3 MG/DL
LC CALCIUM, URINE: 354.9 MG/24 HR
MAGNESIUM, UR(24 HR): 122 MG/24 HR
MAGNESIUM, URINE: 9.4 MG/DL
MONOSODIUM URATE: 7.25 RATIO
OSMOLALITY, URINE: 665 MOSMOL/KG
OXALATES, URINE: 18 MG/L
OXALATES, URINE: 23 MG/24 HR
PH, 24 HR URINE: 6.4
PHOSPHORUS, URINE: 50.3 MG/DL
PHOSPHORUS, URINE: 653.9 MG/24 HR
POTASSIUM, URINE: 54 MMOL/L
POTASSIUM, URINE: 70.2 MMOL/24 HR
SODIUM, URINE: 148 MMOL/L
SODIUM, URINE: 192 MMOL/24 HR
STRUVITE: 0.09 RATIO
SULFATE, URINE: 20 MEQ/L
SULFATE, URINE: 26 MEQ/24 HR
URIC ACID, URINE: 43 MG/DL
URIC ACID, URINE: 559 MG/24 HR
URIC ACID: 0.78 RATIO
URINE VOLUME (PRESERVATIVE): 1300 ML/24 HR
URINE VOLUME: 1300 ML/24 HR

## 2023-12-07 ENCOUNTER — TELEPHONE (OUTPATIENT)
Dept: NEPHROLOGY | Facility: CLINIC | Age: 43
End: 2023-12-07

## 2023-12-07 NOTE — TELEPHONE ENCOUNTER
Spoke to pt about her 24 hour urine test result which showed high calcium in the urine. Discussed increased fluid intake and dietary changes.

## 2025-01-06 ENCOUNTER — APPOINTMENT (OUTPATIENT)
Dept: GENERAL RADIOLOGY | Age: 45
End: 2025-01-06
Attending: PHYSICIAN ASSISTANT
Payer: COMMERCIAL

## 2025-01-06 ENCOUNTER — HOSPITAL ENCOUNTER (OUTPATIENT)
Age: 45
Discharge: HOME | End: 2025-01-06
Payer: COMMERCIAL

## 2025-01-06 VITALS
TEMPERATURE: 99 F | OXYGEN SATURATION: 100 % | RESPIRATION RATE: 20 BRPM | DIASTOLIC BLOOD PRESSURE: 77 MMHG | HEART RATE: 87 BPM | SYSTOLIC BLOOD PRESSURE: 135 MMHG

## 2025-01-06 DIAGNOSIS — R05.9 COUGH: ICD-10-CM

## 2025-01-06 DIAGNOSIS — J11.1 INFLUENZA: Primary | ICD-10-CM

## 2025-01-06 LAB
POCT INFLUENZA A: POSITIVE
POCT INFLUENZA B: NEGATIVE
SARS-COV-2 RNA RESP QL NAA+PROBE: NOT DETECTED

## 2025-01-06 PROCEDURE — 71046 X-RAY EXAM CHEST 2 VIEWS: CPT | Performed by: PHYSICIAN ASSISTANT

## 2025-01-06 RX ORDER — ALBUTEROL SULFATE 90 UG/1
2 INHALANT RESPIRATORY (INHALATION) EVERY 4 HOURS PRN
Qty: 1 EACH | Refills: 0 | Status: SHIPPED | OUTPATIENT
Start: 2025-01-06 | End: 2025-02-05

## 2025-01-06 RX ORDER — IBUPROFEN 600 MG/1
TABLET, FILM COATED ORAL
Qty: 20 TABLET | Refills: 0 | Status: SHIPPED | OUTPATIENT
Start: 2025-01-06

## 2025-01-06 RX ORDER — BENZONATATE 100 MG/1
100 CAPSULE ORAL 3 TIMES DAILY PRN
Qty: 30 CAPSULE | Refills: 0 | Status: SHIPPED | OUTPATIENT
Start: 2025-01-06 | End: 2025-02-05

## 2025-01-06 RX ORDER — OSELTAMIVIR PHOSPHATE 75 MG/1
75 CAPSULE ORAL 2 TIMES DAILY
Qty: 10 CAPSULE | Refills: 0 | Status: SHIPPED | OUTPATIENT
Start: 2025-01-06 | End: 2025-01-11

## 2025-01-06 NOTE — ED PROVIDER NOTES
Patient Seen in: [facility]    History     Chief Complaint   Patient presents with    Cough/URI    Body ache and/or chills     Stated Complaint: fever    HPI    Harper Ham is a 44 year old female presents with chief complaint of cough.  Onset 3 days ago.  Patient reports associated fever and generalized bodyaches.  Patient denies chills, earache, nasal drainage, sore throat, abdominal pain, nausea, vomiting, diarrhea, constipation, dysuria, hematuria, flank pain, rash, neck pain, neck swelling, restricted neck movement, dyspnea, wheeze, hemoptysis.      Past Medical History:    Anxiety    Chronic UTI    Chronic UTI, congenital anomaly, s/p urethral reconstruction    Contraception    IUD    Hyperthyroidism    Was on meds briefly, off meds    Kidney stones    Medullary sponge kidney    Pregnancy (HCC)    Pregnancy; Management:  4 hr labor ; Facility:  [facility]; Provider:  Hermila Núñez; Outcome/detail:  40 week 8 lb(S) 6oz Female; Comments:  Rachel (JLK)\"    Pregnancy (HCC)    Pregnancy; Management:   - ANNALEE Cuellar; Provider:  Young Huerta; Comments:  APGARS 9 (1 min), 9 (5 min) - 1st degree perineal laceration\"    Psoriasis    Mostly scalp    Vertigo    Visual impairment    contacts       Past Surgical History:   Procedure Laterality Date    Incise external hemorrhoid  2013    L posterior quad    Incise external hemorrhoid  10/29/2015    R posterior quad    Skin surgery Left 10/2015    wart removal    Tubal ligation      Urology surgery procedure unlisted      Uretheral reconstruction            Family History   Problem Relation Age of Onset    Hypertension Father     Kidney Disease Father         ESRD on HD, s/p transplant    Thyroid Disorder Mother     Other (Kidney stones) Mother     Colon Cancer Other         No Family h/o Colon Cancer    Other (Inflammatory bowel disease) Other         No Family h/o Inflammatory bowel disease       Social History     Socioeconomic History     Marital status:     Number of children: 2   Occupational History    Occupation: teacher     Comment: elementary   Tobacco Use    Smoking status: Every Day     Current packs/day: 0.50     Average packs/day: 0.5 packs/day for 10.0 years (5.0 ttl pk-yrs)     Types: Cigarettes    Smokeless tobacco: Never   Vaping Use    Vaping status: Never Used   Substance and Sexual Activity    Alcohol use: Yes     Alcohol/week: 1.0 standard drink of alcohol     Types: 1 Glasses of wine per week     Comment: occasionally    Drug use: Yes     Types: Cannabis   Other Topics Concern    Caffeine Concern Yes     Comment: (Coffee, Soda) 2 cups daily    Pt has a pacemaker No    Pt has a defibrillator No    Reaction to local anesthetic No       Review of Systems    Positive for stated complaint: fever  Other systems are as noted in HPI.  Constitutional and vital signs reviewed.      All other systems reviewed and negative except as noted above.    PSFH elements reviewed from today and agreed except as otherwise stated in HPI.    Physical Exam     ED Triage Vitals [01/06/25 1351]   /77   Pulse 87   Resp 20   Temp 99.1 °F (37.3 °C)   Temp src Oral   SpO2 100 %   O2 Device None (Room air)       Current:/77   Pulse 87   Temp 99.1 °F (37.3 °C) (Oral)   Resp 20   SpO2 100%     PULSE OX within normal limits on room air as interpreted by this provider.     Constitutional: The patient is cooperative. Appears well-developed and well-nourished.  No acute distress.  Psychological: Alert, No abnormalities of mood, affect.  Head: Normocephalic/atraumatic.   Eyes: Pupils are equal round reactive to light.  Conjunctiva are within normal limits.  ENT: Pharynx noninjected.  Tonsils within normal size limits bilaterally.  No tonsillar exudates.  TMs within normal limits bilaterally.  Mucous membranes moist.  Neck: The neck is supple.  No meningeal signs.  Chest: There is no tenderness to the chest wall.  No CVA tenderness  bilaterally.  Respiratory: Respiratory effort was normal.  Decreased breath sounds right lung.  There is no stridor.  Air entry is equal.  Cardiovascular: Regular rate and rhythm.  Capillary refill is brisk.    Lymphatic: No gross lymphadenopathy noted.  Musculoskeletal: Musculoskeletal system is grossly intact.  There is no obvious deformity.  Neurological: Gross motor movement is intact in all 4 extremities.  Patient exhibits normal speech.  Skin: Skin is normal to inspection.  Warm and dry.  No obvious bruising.  No obvious rash.        ED Course     Labs Reviewed   POCT FLU TEST - Abnormal; Notable for the following components:       Result Value    POCT INFLUENZA A Positive (*)     All other components within normal limits    Narrative:     This assay is a rapid molecular in vitro test utilizing nucleic acid amplification of influenza A and B viral RNA.   RAPID SARS-COV-2 BY PCR - Normal       MDM     Differential diagnosis including but not limited to URI, bronchitis, pneumonia    Influenza positive.  COVID-19 negative.    Radiology findings: XR CHEST PA + LAT CHEST (CPT=71046)    Result Date: 1/6/2025  CONCLUSION:   Negative for radiographically evident acute intrathoracic process.    elm-remote  Dictated by (CST): Ede Ayala MD on 1/06/2025 at 2:26 PM     Finalized by (CST): Ede Ayala MD on 1/06/2025 at 2:27 PM           Chest x-ray images independently reviewed by this provider-no pneumonia.    Physical exam remained stable over serial reexaminations as previously documented.  Results reviewed with patient.    I have given the patient instructions regarding their diagnoses, expectations, follow up, and ER precautions. I explained to the patient that emergent conditions may arise and to go to the ER for new, worsening or any persistent conditions. I've explained the importance of following up with their doctor as instructed. The patient verbalized understanding of the discharge instructions and  plan.      Disposition and Plan     Clinical Impression:  1. Influenza    2. Cough        Disposition:  Discharge    Follow-up:  Bernardino Julian MD  [address] [place], [place] [id number]  [phone]    Call in 1 day  For follow-up      Medications Prescribed:  Current Discharge Medication List        START taking these medications    Details   oseltamivir (TAMIFLU) 75 MG Oral Cap Take 1 capsule (75 mg total) by mouth 2 (two) times daily for 5 days.  Qty: 10 capsule, Refills: 0      ibuprofen 600 MG Oral Tab Take 1 tablet (600 mg total) by mouth every 6 hours with food  Qty: 20 tablet, Refills: 0      albuterol 108 (90 Base) MCG/ACT Inhalation Aero Soln Inhale 2 puffs into the lungs every 4 (four) hours as needed for Wheezing.  Qty: 1 each, Refills: 0      Spacer/Aero-Holding Chambers Does not apply Device Use with albuterol inhaler  Qty: 1 each, Refills: 0      benzonatate 100 MG Oral Cap Take 1 capsule (100 mg total) by mouth 3 (three) times daily as needed for cough.  Qty: 30 capsule, Refills: 0

## 2025-03-17 ENCOUNTER — OFFICE VISIT (OUTPATIENT)
Dept: FAMILY MEDICINE CLINIC | Facility: CLINIC | Age: 45
End: 2025-03-17
Payer: COMMERCIAL

## 2025-03-17 VITALS
DIASTOLIC BLOOD PRESSURE: 77 MMHG | SYSTOLIC BLOOD PRESSURE: 112 MMHG | HEIGHT: 68 IN | HEART RATE: 84 BPM | WEIGHT: 143 LBS | OXYGEN SATURATION: 97 % | BODY MASS INDEX: 21.67 KG/M2

## 2025-03-17 DIAGNOSIS — N63.11 MASS OF UPPER OUTER QUADRANT OF RIGHT BREAST: Primary | ICD-10-CM

## 2025-03-17 NOTE — PROGRESS NOTES
Subjective:   Harper Ham is a 44 year old female who presents for Breast Lump (Pt states she has noticed a lump on right breast 2 weeks ago and states she is overdue for a mammogram  and she states the lump is sore but now redness or swelling. ).     Patient presents with 1 on her right breast that she noticed 2 weeks ago.  Patient's last mammogram was in 2021.  She states she had a lump on the left breast which were benign cysts. patient gets nipple discharge from both breasts for the past several years.  Patient denies redness, pain unless pressed really hard, fever,, chest pain, fatigue, SOB, headaches, joint swelling, nausea or vomiting.  Patient has a history of smoking 5 cigarettes daily.  Her LMP: 03/01/2025.       History/Other:      Chief Complaint Reviewed and Verified  Nursing Notes Reviewed and   Verified  Tobacco Reviewed  Allergies Reviewed  Medications Reviewed    Problem List Reviewed  Medical History Reviewed  Surgical History   Reviewed  OB Status Reviewed  Family History Reviewed  Social History   Reviewed           Tobacco:  Social History     Tobacco Use   Smoking Status Every Day    Current packs/day: 0.50    Average packs/day: 0.5 packs/day for 0.2 years (0.1 ttl pk-yrs)    Types: Cigarettes    Start date: 2025   Smokeless Tobacco Never     E-Cigarettes/Vaping       Questions Responses    E-Cigarette Use Never User           Tobacco cessation counseling for <3 minutes.        Current Outpatient Medications   Medication Sig Dispense Refill    KRILL OIL OR Take by mouth.      Ascorbic Acid (VITAMIN C OR) Take by mouth.      Ergocalciferol (VITAMIN D OR) Take by mouth.         Allergies[1]    Review of Systems   Constitutional: Negative.  Negative for activity change and fatigue.   HENT: Negative.  Negative for congestion, ear pain, rhinorrhea and sneezing.    Eyes: Negative.  Negative for redness.   Respiratory: Negative.  Negative for cough, shortness of breath and wheezing.     Cardiovascular: Negative.  Negative for chest pain.   Gastrointestinal: Negative.  Negative for abdominal pain, constipation, diarrhea, nausea and vomiting.   Endocrine: Negative.    Genitourinary: Negative.  Negative for difficulty urinating and frequency.   Musculoskeletal: Negative.  Negative for back pain, joint swelling and myalgias.   Skin: Negative.  Negative for rash.   Allergic/Immunologic: Negative.    Neurological: Negative.  Negative for dizziness, syncope, light-headedness and headaches.   Hematological: Negative.    Psychiatric/Behavioral: Negative.           Objective:   /77 (BP Location: Right arm, Patient Position: Sitting, Cuff Size: adult)   Pulse 84   Ht 5' 8\" (1.727 m)   Wt 143 lb (64.9 kg)   LMP 03/01/2025 (Exact Date)   SpO2 97%   BMI 21.74 kg/m²  Estimated body mass index is 21.74 kg/m² as calculated from the following:    Height as of this encounter: 5' 8\" (1.727 m).    Weight as of this encounter: 143 lb (64.9 kg).      Physical Exam  Vitals and nursing note reviewed.   Constitutional:       Appearance: Normal appearance. She is normal weight.   HENT:      Head: Normocephalic and atraumatic.      Right Ear: Tympanic membrane normal.      Left Ear: Tympanic membrane normal.      Nose: Nose normal.      Mouth/Throat:      Mouth: Mucous membranes are moist.      Pharynx: Oropharynx is clear.   Eyes:      Extraocular Movements: Extraocular movements intact.      Conjunctiva/sclera: Conjunctivae normal.      Pupils: Pupils are equal, round, and reactive to light.   Cardiovascular:      Rate and Rhythm: Normal rate and regular rhythm.      Pulses: Normal pulses.      Heart sounds: Normal heart sounds.   Pulmonary:      Effort: Pulmonary effort is normal.      Breath sounds: Normal breath sounds.   Chest:      Chest wall: Mass present.       Abdominal:      General: Abdomen is flat. Bowel sounds are normal.      Palpations: Abdomen is soft.   Musculoskeletal:         General: Normal  range of motion.      Cervical back: Normal range of motion and neck supple.   Skin:     General: Skin is warm and dry.   Neurological:      General: No focal deficit present.      Mental Status: She is alert and oriented to person, place, and time. Mental status is at baseline.   Psychiatric:         Mood and Affect: Mood normal.         Behavior: Behavior normal.         Thought Content: Thought content normal.         Judgment: Judgment normal.           Assessment & Plan:     Assessment & Plan  Mass of upper outer quadrant of right breast  Patient diagnostic mammogram ordered  Patient to follow-up after mammogram  Orders:    [facility] LUCAS 2D+3D DIAGNOSTIC [facility]  BILAT (CPT=77066/05768); Future      Medication use, effects and side effects discussed in detail with patient. The patient  indicated understanding of the diagnosis and agreed with the plan of care.    Return in about 4 weeks (around 4/14/2025).    CLEMENTINA Londono         [1]   Allergies  Allergen Reactions    Methimazole [Thiamazole] HIVES    Dayquil [Day Time] ANXIETY    Sulfamethoxazole RASH    Trimethoprim RASH

## 2025-03-18 ENCOUNTER — HOSPITAL ENCOUNTER (OUTPATIENT)
Dept: MAMMOGRAPHY | Facility: HOSPITAL | Age: 45
Discharge: HOME | End: 2025-03-18
Payer: COMMERCIAL

## 2025-03-18 ENCOUNTER — HOSPITAL ENCOUNTER (OUTPATIENT)
Dept: ULTRASOUND IMAGING | Facility: HOSPITAL | Age: 45
Discharge: HOME | End: 2025-03-18
Payer: COMMERCIAL

## 2025-03-18 DIAGNOSIS — N63.10 BREAST MASS, RIGHT: ICD-10-CM

## 2025-03-18 DIAGNOSIS — N63.11 MASS OF UPPER OUTER QUADRANT OF RIGHT BREAST: ICD-10-CM

## 2025-03-18 PROCEDURE — 88377 M/PHMTRC ALYS ISHQUANT/SEMIQ: CPT | Performed by: STUDENT IN AN ORGANIZED HEALTH CARE EDUCATION/TRAINING PROGRAM

## 2025-03-18 PROCEDURE — 88305 TISSUE EXAM BY PATHOLOGIST: CPT

## 2025-03-18 PROCEDURE — 88341 IMHCHEM/IMCYTCHM EA ADD ANTB: CPT

## 2025-03-18 PROCEDURE — 19083 BX BREAST 1ST LESION US IMAG: CPT

## 2025-03-18 PROCEDURE — 77065 DX MAMMO INCL CAD UNI: CPT

## 2025-03-18 PROCEDURE — 88342 IMHCHEM/IMCYTCHM 1ST ANTB: CPT

## 2025-03-18 PROCEDURE — 19084 BX BREAST ADD LESION US IMAG: CPT

## 2025-03-18 PROCEDURE — 77062 BREAST TOMOSYNTHESIS BI: CPT

## 2025-03-18 PROCEDURE — 88360 TUMOR IMMUNOHISTOCHEM/MANUAL: CPT

## 2025-03-18 PROCEDURE — 76642 ULTRASOUND BREAST LIMITED: CPT

## 2025-03-18 PROCEDURE — 77066 DX MAMMO INCL CAD BI: CPT

## 2025-03-18 NOTE — IMAGING NOTE
This nurse introduced self and role of breast coordinator.  Discussed recommended breast biopsy with patient. Pt was recommended by Dr. Garcia  to have a right  breast ultrasound guided biopsy.   Pt history discussed as below:  Pt history of biopsy:         Family history of cancer:  Pt history of breast cancer:  Hx BCP use:                    HRT use:          Recommedations :                      see epic for dictated radiology report   Reviewed pertinent patient history, family history of cancer, and patient medications.     -All herbal supplements, Vitamin E, Fish Oil    -All NSAIDs (Ibuprofen, Motrin, Advil, Aleve, or other antiinflammatory medication)  and Aspirin  81mg currently being taken    not  recommended or prescribed by  your physician  should be held for 5  days prior to biopsy.    -Aspirin 81 mg being taken related to a cardiac condition  or prescribed by your  physician should be held at the  direction of your physician.  Informed patient to call ordering physician for guidelines    -Blood thinners/antiplatelet medications (Coumadin, Plavix ect) should be held at the  direction of your physician.  Informed patient to call ordering physician for guidelines   Reviewed US guided biopsy procedure, as below.  You will be lying on your back, potentially slightly toward one side, for this procedure.  The US technician will use the ultrasound machine to locate the area in question that was seen on your previous breast imaging.  The Radiologist will then inject a local numbing medication into the area. This may burn and sting for several seconds .  Then use a needle to collect cells or tissue from the site.  A marker, or clip, will then be placed in the biopsied area.  This marker is placed so this biopsy site is able to be accurately located upon future breast imaging.  After the clip is placed, steri strips will be applied to  the biopsy site and should be kept on for 5 days.  Additional mammography films  will then be taken to assure correct placement of the placed marker.    Educated the patient they will be awake during this procedure and are able to drive themselves home if they wish.  Educated patient that they should eat breakfast and park in green lot and check in with [facility] .  Educated patient that some soreness  may occur after biopsy.  Discussed use of a supportive bra and ice packs after procedure, to decrease soreness.  Tylenol only for discomfort unless they have an allergy to tylenol .  Discussed with patient no swimming, bathing,  hot tubs or submerging underwater  for 5 days post procedure until the incision is closed and healed.   Educated patient on lifting restrictions - nothing heavier than a gallon of milk for 24-48 hours after the procedure.      Discussed with patient that some soreness and bruising is normal after biopsy but that prolonged or increased pain and bruising should be reported to the ordering physician.   Educated patient that they should bring a sports bra or form fitting bra on day of procedure. Educated patient that this helps with comfort after the biopsy and decrease swelling.  Reviewed results process with patient and shared that pathology results will be available within 2-3 business days of their biopsy.  Discussed results will be communicated by their ordering physician unless otherwise indicated.  Educated patient that once we receive an order from her physician  our radiology secretaries would be calling   to schedule the biopsy.

## 2025-03-18 NOTE — IMAGING NOTE
This nurse introduced self and role of breast coordinator.  Discussed recommended breast biopsy with patient. Pt was recommended by Dr. Garcia to have a right  breast ultrasound guided biopsy. Spouse Kelton is with pt. Pt has 2 dtrs 17,14. DR Garcia informed pt highly suspicious and likely  for a breast cancer and if Bx comes back benign she would need to se a breast surgeon to have are removed.  Emotional support given. Pt works in an office .  1224 pm I contacted Dr Nicole villa Md for today. Dr Rivera reviewed case  and will do Bx today at 1330. Dr Rivera to Bx 2 sites under us  guidance. Pt informed 2 site not 1 site us to be done Pt    verbalizes\"glad to get Bx done today\"provided apple juice and pallavi crackers.     1245 pt consented  by this Rn post instructions given verbal et written   Pt history discussed as below:  Pt history of biopsy: cyst 2021        Family history of cancer:no  Pt history of breast cancer: no   Hx BCP use:     briefly               HRT use:    no ivf  no    BI-RADS CATEGORY:     DIAGNOSTIC CATEGORY 5 - HIGHLY SUGGESTIVE OF MALIGNANCY.       RECOMMENDATIONS:   STEREOTACTIC BREAST BIOPSY: RIGHT BREAST X1 SITE       ULTRASOUND-GUIDED CORE BIOPSY: RIGHT BREAST X1 SITE               Recommedations :                      see [facility] for dictated radiology report   Reviewed pertinent patient history, family history of cancer, and patient medications.     -All herbal supplements, Vitamin E, Fish Oil    -All NSAIDs (Ibuprofen, Motrin, Advil, Aleve, or other antiinflammatory medication)  and Aspirin  81mg currently being taken    not  recommended or prescribed by  your physician  should be held for 5  days prior to biopsy.  Denies usage   -Aspirin 81 mg being taken related to a cardiac condition  or prescribed by your  physician should be held at the  direction of your physician.  Informed patient to call ordering physician for guidelines denies usage    -Blood thinners/antiplatelet  medications (Coumadin, Plavix ect) should be held at the  direction of your physician.  Informed patient to call ordering physician for guidelines  denies usage  Reviewed US guided biopsy procedure, as below.  You will be lying on your back, potentially slightly toward one side, for this procedure.  The US technician will use the ultrasound machine to locate the area in question that was seen on your previous breast imaging.  The Radiologist will then inject a local numbing medication into the area. This may burn and sting for several seconds .  Then use a needle to collect cells or tissue from the site.  A marker, or clip, will then be placed in the biopsied area.  This marker is placed so this biopsy site is able to be accurately located upon future breast imaging.  After the clip is placed, steri strips will be applied to  the biopsy site and should be kept on for 5 days.  Additional mammography films will then be taken to assure correct placement of the placed marker.    Educated the patient they will be awake during this procedure and are able to drive themselves home if they wish.  Educated patient that they should eat breakfast and park in green lot and check in with [facility] .  Educated patient that some soreness  may occur after biopsy.  Discussed use of a supportive bra and ice packs after procedure, to decrease soreness.  Tylenol only for discomfort unless they have an allergy to tylenol .  Discussed with patient no swimming, bathing,  hot tubs or submerging underwater  for 5 days post procedure until the incision is closed and healed.   Educated patient on lifting restrictions - nothing heavier than a gallon of milk for 24-48 hours after the procedure.      Discussed with patient that some soreness and bruising is normal after biopsy but that prolonged or increased pain and bruising should be reported to the ordering physician.   Educated patient that they should bring a sports bra or form fitting  bra on day of procedure. Educated patient that this helps with comfort after the biopsy and decrease swelling.  Reviewed results process with patient and shared that pathology results will be available within 2-3 business days of their biopsy.  Discussed results will be communicated by their ordering physician unless otherwise indicated.  Educated patient that once we receive an order from her physician  our radiology secretaries would be calling   to schedule the biopsy.

## 2025-03-18 NOTE — DISCHARGE INSTRUCTIONS
The Doctor (Radiologist) who performed your procedure was: Dr VALLES Place an ice pack over the biopsy site on top of your bra or on top of the ACE wrap (never apply ice directly over skin) for 10-15 minutes of every hour until bedtime for your comfort and to decrease bleeding.  Keep your sports bra or the ACE wrap (stereotactic and MRI biopsy) in place for 24 hours after your biopsy. This compression decreases bleeding and breast movement for your comfort. Wear a supportive bra for the next couple of days for comfort (sports bra for sleep).   Continue to wear, preferably, a sports bra or good supportive bra for 1 week and take off only to shower.  No baths or showers during the first 24 hours after biopsy. After this time you may take a shower. It's okay if the strips get wet but do not soak them. NO saunas, hot tubs or swimming until steri-strips fall off (approx. 5 days). This prevents infection and allows time for them to completely close and heal.  DO NOT remove the steri-strips. They will fall off in 5 days. If any type of irritation (redness, itching or blisters) develops in the area around the steri-strips, remove them gently. If the steri-strips do not fall off after 5 days, gently remove them. Keep the area clean and dry.  It is normal to have mild discomfort and bruising at the biopsy site.  You may take Tylenol as needed for discomfort, as long as you have no allergies to Tylenol. Do not take aspirin, motrin, ibuprofen or any medication containing NSAID (non-steroidal anti-inflammatory drug) product for 48 hours.  DO NOT participate in strenuous activity (aerobics, heavy lifting, housework, gardening, etc.) 48 hours after your biopsy to prevent bleeding.  You will receive results in 2-3 business days.  If you are having an MRI breast biopsy or an Ultrasound guided breast biopsy, you will be billed for the biopsy and unilateral mammogram separately.  If you have any questions about the procedure or  your results, please contact the Breast Care Coordinator Nurse at [phone].  Notify your ordering physician or primary physician for increased bleeding, pain or fever over 100. Or contact a Radiology Nurse at [phone] between 8am-4pm (after 4pm, your call will be directed to the [facility]).

## 2025-03-20 ENCOUNTER — TELEPHONE (OUTPATIENT)
Dept: FAMILY MEDICINE CLINIC | Facility: CLINIC | Age: 45
End: 2025-03-20

## 2025-03-20 ENCOUNTER — TELEMEDICINE (OUTPATIENT)
Dept: FAMILY MEDICINE CLINIC | Facility: CLINIC | Age: 45
End: 2025-03-20
Payer: COMMERCIAL

## 2025-03-20 ENCOUNTER — TELEPHONE (OUTPATIENT)
Age: 45
End: 2025-03-20

## 2025-03-20 DIAGNOSIS — R89.7 ABNORMAL BREAST BIOPSY: Primary | ICD-10-CM

## 2025-03-20 PROCEDURE — 98006 SYNCH AUDIO-VIDEO EST MOD 30: CPT

## 2025-03-20 PROCEDURE — 88377 M/PHMTRC ALYS ISHQUANT/SEMIQ: CPT | Performed by: STUDENT IN AN ORGANIZED HEALTH CARE EDUCATION/TRAINING PROGRAM

## 2025-03-20 NOTE — TELEPHONE ENCOUNTER
Received a call from Nuris the nurse navigator with the cancer center who stated the patients breast biopsy came back malignant, they are asking which surgical oncologist the patient should be referred to. Ghazal Puri please advise.

## 2025-03-20 NOTE — TELEPHONE ENCOUNTER
Patient wanted to speak to Ghazal Puri regarding abnormal biopsy results. Video visit made for today at 1pm with Ghazal Puri. Went over instructions, copay and that provider will be calling from a restricted/unknown number to make sure able to accept/pickup those calls. Patient agreed.

## 2025-03-20 NOTE — TELEPHONE ENCOUNTER
Phoned patient and introduced myself as Breast Nurse Navigator. Shared my role to provide support and education, assist with care coordination, as well as connect her to supportive resources as she may need them.      Patient is s/p two site right breast biopsy performed on 3/18/2025 results below    Lab Results   Component Value Date    FINALDIA  03/18/2025       A. Right breast, 9:00, 6 cm from nipple; ultrasound-guided core needle biopsy:   Invasive lobular carcinoma, grade 2 of 3 ([place] Histologic Score 6/9: tubular differentiation 3/3, nuclear pleomorphism 2/3, mitotic activity 1/3)  The maximum length of tumor area in the tissue submitted is 5 mm  Background atypical lobular hyperplasia present    B. Right breast, 9:30, 5 cm from nipple; ultrasound-guided core needle biopsy:   Invasive lobular carcinoma with focal ductal differentiation, grade 1 of 3 ([place] Histologic Score 5/9: tubular differentiation 2/3, nuclear pleomorphism 2/3, mitotic activity 1/3)  The maximum length of tumor area in the tissue submitted is 9.5 mm  Background atypical lobular hyperplasia present    Comment:  For  purposes, this case was reviewed by a second pathologist who concurred with the diagnosis.    Immunostains including ER, DE, HER2/lisa, Ki-67, E-cadherin, and GATA3 with performed on blocks A1 and B1 with appropriate reactive controls.     Tissue block A1 and B1 are being sent to [facility] for FISH HER2/lisa analysis, and the results will be reported in linked reports.    For part A, the invasive carcinoma cells are positive for GATA3, and they show loss of E-cadherin expression, consistent with a lobular phenotype.    For part B the invasive carcinoma cells are positive for GATA3, and they show loss of E-cadherin expression, consistent with a lobular phenotype.    Tumor Markers by Immunostaining (Polymer based detection method) using the ASCO/CAP scoring criteria, performed at [facility]  [facility] on formalin fixed paraffin embedded tissue:      Part A  Estrogen receptor (Leica, monoclonal, clone 6 F11) status: 98% (positive, moderate intensity)  Progesterone receptor (Leica, monoclonal, clone 16) status: 99% (positive, strong intensity)  HER-2/lisa (Leica, monoclonal, clone CB11) status: 2+ (equivocal)  Ki-67 (Leica, monoclonal, clone MM1) status: 4% (favorable)    Part B  Estrogen receptor (Leica, monoclonal, clone 6 F11) status: 90% (positive, moderate intensity)  Progesterone receptor (Leica, monoclonal, clone 16) status: 99% (positive, strong intensity)  HER-2/lisa (Leica, monoclonal, clone CB11) status: 2+ (equivocal)  Ki-67 (Leica, monoclonal, clone MM1) status: 4% (favorable)    ASCO/CAP Scoring Criteria:    ER/PgR:    > 1% (Positive), Intensity of staining (weak, moderate, strong)  <1% (Negative) Internal control present and ER/IA positive/negative    HER-2/lisa:  0 (Negative):  No staining  1+ (Negative): Weak and incomplete membrane staining in more than 10% of tumor cells  2+ (Equivocal): Weak to moderate complete membranous staining in more than 10% of tumor cells.  3+ (Positive): Strong complete staining in more than 10% of tumor cells     Ki 67 interpretation Key:  Ki-67:  1-9% Favorable, 10-20% Borderline, % Unfavorable     The tissue that has been submitted for tumor markers by manual quantitative and semi quantitative analysis was fixed in 10% neutral buffered formalin, following the recommended CAP guidelines time fixation (6-72 hours ).     All antibodies are validated to document appropriate staining reactions.  All immunohistochemical positive controls are examined and showed appropriate reactivity. Validation, Performance, reporting,  and proficiency testing regarding the assay is in compliance with the publishes ASCO-CAP Guideline (2020).             Pathology explained and questions answered to patient's satisfaction. Shared with patient next steps to  establish care with a medical oncologist. Patient has been referred to Dr. Wise for further management. This was shared with patient.  NN explained what will be discussed at consultation, including possibility of breast MRI and genetic testing. NN offered patient consultation with Dr. Wise on 3/24 at 2:20, patient agreeable. Patient expressed that everyone she has been in contact with thus far has been so wonderful, she is happy to have her care at [place].     NN will send MyChart message with contact information and encourage patient to reach out should any questions arise.

## 2025-03-20 NOTE — PROGRESS NOTES
Virtual Visit Check-In    LEROY FLORES or legal guardian   verbally consents to a Virtual Visit Check-In service on 3/20/2025    Patient understands and accepts financial responsibility for any deductible, co-insurance and/or co-pays associated with this service.    Duration of the service: 10    Chief Complaint   Patient presents with    Lab Results       HPI:    Patient ID: Leroy Flores is a 44 year old female.      Patient presents for follow-up on recent biopsy results that was done.  Biopsy result showed malignancy of her right breast.  Patient had 2 sites biopsied on her right breast.  Both sites showed malignancy.  Patient here to discuss further treatment and management.  Patient has a family history of breast cancer.      ROS    Review of Systems   Constitutional: Negative.  Negative for activity change and fatigue.   HENT: Negative.  Negative for congestion, ear pain, rhinorrhea and sneezing.    Eyes: Negative.  Negative for redness.   Respiratory: Negative.  Negative for cough, shortness of breath and wheezing.    Cardiovascular: Negative.  Negative for chest pain.   Gastrointestinal: Negative.  Negative for abdominal pain, constipation, diarrhea, nausea and vomiting.   Endocrine: Negative.    Genitourinary: Negative.  Negative for difficulty urinating and frequency.   Musculoskeletal: Negative.  Negative for back pain, joint swelling and myalgias.   Skin: Negative.  Negative for rash.   Allergic/Immunologic: Negative.    Neurological: Negative.  Negative for dizziness, syncope, light-headedness and headaches.   Hematological: Negative.    Psychiatric/Behavioral: Negative.               Current Outpatient Medications   Medication Sig Dispense Refill    ibuprofen 600 MG Oral Tab Take 1 tablet (600 mg total) by mouth every 6 hours with food (Patient not taking: Reported on 3/17/2025) 20 tablet 0    Spacer/Aero-Holding Chambers Does not apply Device Use with albuterol inhaler (Patient not taking:  Reported on 3/17/2025) 1 each 0    nitrofurantoin monohydrate macro 100 MG Oral Cap Take 1 capsule (100 mg total) by mouth 2 (two) times daily. (Patient not taking: Reported on 3/17/2025) 10 capsule 0    KRILL OIL OR Take by mouth. (Patient not taking: Reported on 3/17/2025)      Ascorbic Acid (VITAMIN C OR) Take by mouth. (Patient not taking: Reported on 3/17/2025)      Ergocalciferol (VITAMIN D OR) Take by mouth. (Patient not taking: Reported on 3/17/2025)         Allergies:Allergies[1]       Current Outpatient Medications:     ibuprofen 600 MG Oral Tab, Take 1 tablet (600 mg total) by mouth every 6 hours with food (Patient not taking: Reported on 3/17/2025), Disp: 20 tablet, Rfl: 0    Spacer/Aero-Holding Chambers Does not apply Device, Use with albuterol inhaler (Patient not taking: Reported on 3/17/2025), Disp: 1 each, Rfl: 0    nitrofurantoin monohydrate macro 100 MG Oral Cap, Take 1 capsule (100 mg total) by mouth 2 (two) times daily. (Patient not taking: Reported on 3/17/2025), Disp: 10 capsule, Rfl: 0    KRILL OIL OR, Take by mouth. (Patient not taking: Reported on 3/17/2025), Disp: , Rfl:     Ascorbic Acid (VITAMIN C OR), Take by mouth. (Patient not taking: Reported on 3/17/2025), Disp: , Rfl:     Ergocalciferol (VITAMIN D OR), Take by mouth. (Patient not taking: Reported on 3/17/2025), Disp: , Rfl:     Past Medical History:    Anxiety    Chronic UTI    Chronic UTI, congenital anomaly, s/p urethral reconstruction    Contraception    IUD    Hyperthyroidism    Was on meds briefly, off meds    Kidney stones    Medullary sponge kidney    Pregnancy (HCC)    Pregnancy; Management:  4 hr labor ; Facility:  [facility]; Provider:  Hermila Núñez; Outcome/detail:  40 week 8 lb(S) 6oz Female; Comments:  Rachel (JLK)\"    Pregnancy (HCC)    Pregnancy; Management:   - ANNALEE Cuellar; Provider:  Young Huerta; Comments:  APGARS 9 (1 min), 9 (5 min) - 1st degree perineal laceration\"    Psoriasis    Mostly scalp    Vertigo     Visual impairment    contacts         PHYSICAL EXAM:     Patient was speaking in complete sentences, no increased work of breathing and very coherent and alert on the phone.  Alert and oriented x 3  Patient was responding to questions appropriately.  Patient did not appear short of breath.    ASSESSMENT/PLAN:     Encounter Diagnosis   Name Primary?    Abnormal breast biopsy Yes       1. Abnormal breast biopsy  -Patient's questions answered about pathology report.  -Explained to patient that breast center specialist will contact her soon  -Will refer patient to oncology/hematologist and surgical breast oncologist  -Explained to patient that oncologist will determine treatment and outcomes with her  - Oncology/Hematology Referral - In Network  - Surg Onc Breast Referral - In Network      Patient verbalized understanding of plan and all questions answered to the best of my ability.    Patient to call back if any change/ worsening of symptoms.    No orders of the defined types were placed in this encounter.      Meds This Visit:  Requested Prescriptions      No prescriptions requested or ordered in this encounter       Imaging & Referrals:  OP REFERRAL TO HEMATOLOGY/ONCOLOGY GROUP  OP REFERRAL TO SURGICAL ONCOLOGY           CLEMENTINA Londono  3/20/2025  1:33 PM      Please note that the following visit was completed using two-way, real-time interactive audio and/or video communication.  This has been done in good benntet to provide continuity of care in the best interest of the provider-patient relationship, due to the ongoing public health crisis/national emergency and because of restrictions of visitation.  There are limitations of this visit as no physical exam could be performed.  Every conscious effort was taken to allow for sufficient and adequate time.  This billing was spent on reviewing labs, medications, radiology tests and decision making.  Appropriate medical decision-making and tests are ordered as  detailed in the plan of care above  ID#[id number]         [1]   Allergies  Allergen Reactions    Methimazole [Thiamazole] HIVES    Dayquil [Day Time] ANXIETY    Sulfamethoxazole RASH    Trimethoprim RASH

## 2025-03-24 ENCOUNTER — OFFICE VISIT (OUTPATIENT)
Age: 45
End: 2025-03-24
Attending: INTERNAL MEDICINE
Payer: COMMERCIAL

## 2025-03-24 ENCOUNTER — NURSE NAVIGATOR ENCOUNTER (OUTPATIENT)
Age: 45
End: 2025-03-24

## 2025-03-24 VITALS
HEIGHT: 68 IN | WEIGHT: 144 LBS | TEMPERATURE: 99 F | HEART RATE: 79 BPM | OXYGEN SATURATION: 99 % | RESPIRATION RATE: 16 BRPM | SYSTOLIC BLOOD PRESSURE: 121 MMHG | BODY MASS INDEX: 21.82 KG/M2 | DIASTOLIC BLOOD PRESSURE: 78 MMHG

## 2025-03-24 DIAGNOSIS — Z17.0 MALIGNANT NEOPLASM OF UPPER-OUTER QUADRANT OF RIGHT BREAST IN FEMALE, ESTROGEN RECEPTOR POSITIVE (HCC): Primary | ICD-10-CM

## 2025-03-24 DIAGNOSIS — C50.911 INVASIVE LOBULAR CARCINOMA OF RIGHT BREAST IN FEMALE (HCC): ICD-10-CM

## 2025-03-24 DIAGNOSIS — C50.411 MALIGNANT NEOPLASM OF UPPER-OUTER QUADRANT OF RIGHT BREAST IN FEMALE, ESTROGEN RECEPTOR POSITIVE (HCC): Primary | ICD-10-CM

## 2025-03-24 DIAGNOSIS — N63.21 MASS OF UPPER OUTER QUADRANT OF LEFT BREAST: ICD-10-CM

## 2025-03-24 DIAGNOSIS — R92.333 HETEROGENEOUSLY DENSE TISSUE OF BOTH BREASTS ON MAMMOGRAPHY: ICD-10-CM

## 2025-03-24 NOTE — PROGRESS NOTES
Patient presents to the [facility] for consultation with Dr. Wise.  Patient is accompanied by her  Kelton for support.     Introduced myself to patient and as Breast Nurse Navigator.  Shared my role to provide support and education, assist with care coordination, as well as connect her to supportive resources as she may need them.     Patient resides in [place]. Patient is  and has two children, aged 14 and 17. Patient's primary support is her friends and family. Patient works as a  for small company . Patient does not have any Medical Leave paperwork to be filled out. Patient does not share concerns regarding barriers to care     Per Dr. Wise, the plan is to proceed with bilateral breast MRI, left breast US, genetics and consultation with Dr. Chapin. NN will help to coordinate all of the necessary appointments. NN informed patient that imaging and genetics will likely be scheduled 3/26, then consultation with Dr. Chapin on 4/2. Patient agreeable. Nurse navigator provided patient with Journey map and Breast Cancer Treatment Handbook. This NN educated patient as to how to best utilize the handbook along with education in regards to anticipated course of clinical care via the Journey Map.  NN provided flyer for [facility] and educated as to provided services. Patient was also provided packet of support resources in the community, including information on the [facility] and different resources they offer. Patient also provided an informational handout regarding breast cancer and lymphedema, brief overview on lymphedema provided by this writer.     Referrals placed for genetic counseling, breast MRI and left breast US.     Provided patient with my card and contact information.  Encouraged patient call or message with any questions concerns or needs.

## 2025-03-24 NOTE — CONSULTS
[facility] Hematology Oncology  Consultation Note    Patient Name: Harper Ham   YOB: 1980   Medical Record Number: [id number]   CSN: [id number]   Consulting Physician: Esha Wise MD  Referring Physician(s): Bernardino Julian MD   Date of Consultation: 3/24/2025     Reason for Consultation:    Encounter Diagnoses   Name Primary?    Malignant neoplasm of upper-outer quadrant of right breast in female, estrogen receptor positive (HCC) Yes    Mass of upper outer quadrant of left breast     Invasive lobular carcinoma of right breast in female (HCC)     Heterogeneously dense tissue of both breasts on mammography        History of Present Illness:   Harper Stanley a 44 year old White female with no significant medical history referred for evaluation of newly diagnosed breast cancer. Her oncologic history is as follows:    2/2025: patient self palpated a kenyetta in there right upper outer breast at the end of February.     3/18/25: bilateral diagnostic mammogram and right ultrasound revealed a 1.6 cm suspicious mass at 9:30 position, 5 cm from nipple, and a smaller adjacent 0.7 cm mass at 9:00, 6 cm from nipple, presumably similar pathology. No enlarged or abnormal lymph nodes in the right axilla.      3/28/25: US guided biopsy of R breast mass 9:00, 6 cm from nipple showed invasive lobular carcinoma, grade 2, ER 98%, ND 99%, HER2 2+ IHC, FISH pending, Ki-67 4%.   9:30 mass, 5 cm from nipple: invasive ductal carcinoma, grade 1, ER 90%, ND 99%, HER2 2+ IHC, FISH pending, Ki-67 4%.     Patient presents today to discuss the diagnosis and next steps. She reports mild breast pain at the site of biopsy, otherwise no redness, swelling, or dimpling prior to the biopsy. No nipple change or discharge. No axillary pain or enlarged nodes. She has been extremely anxious and having difficulty sleeping. She works as an  for a construction company.     Breast Cancer Risk Factors:  Family history: none    Personal history of breast cancer: none  Prior breast biopsy: left breast cyst aspiration   Genetics: unknown  Radiation to chest or face prior to age 28: none  Race/ethnicity: no Ashkenazi Nondenominational ancestry   Age at menarche: 12  Age of menopause: N/A  Pregnancy history:   Breastfeeding history: 2 year total  OCP use: none  HRT use: none  Infertility therapy: none  Overweight: none  Smoking: yes, 5-6 cig/day x25 years     Performance Status: ECOG 0    Past Medical History:  Past Medical History:    Anxiety    Chronic UTI    Chronic UTI, congenital anomaly, s/p urethral reconstruction    Contraception    IUD    Hyperthyroidism    Was on meds briefly, off meds    Kidney stones    Medullary sponge kidney    Pregnancy (HCC)    Pregnancy; Management:  4 hr labor ; Facility:  [facility]; Provider:  Hermila Núñez; Outcome/detail:  40 week 8 lb(S) 6oz Female; Comments:  Rachel (JOAQUÍNK)\"    Pregnancy (HCC)    Pregnancy; Management:   - ANNALEE Cuellar; Provider:  Young Huerta; Comments:  APGARS 9 (1 min), 9 (5 min) - 1st degree perineal laceration\"    Psoriasis    Mostly scalp    Vertigo    Visual impairment    contacts       Past Surgical History:  Past Surgical History:   Procedure Laterality Date    Cyst aspiration left Left         Incise external hemorrhoid  2013    L posterior quad    Incise external hemorrhoid  10/29/2015    R posterior quad    Needle biopsy right Left 2025    2 site    Skin surgery Left 10/2015    wart removal    Tubal ligation      Urology surgery procedure unlisted      Uretheral reconstruction       Family Medical History:  Family History   Problem Relation Age of Onset    Thyroid Disorder Mother     Other (Kidney stones) Mother     Hypertension Father     Kidney Disease Father         ESRD on HD, s/p transplant    Thyroid Disorder Father     Other (Inflammatory bowel disease) Other         No Family h/o Inflammatory bowel disease    Breast Cancer Neg     Ovarian Cancer Neg      Prostate Cancer Neg     Pancreatic Cancer Neg        Gyne History:  OB History    Para Term  AB Living   2 2 2 0 0 2   SAB IAB Ectopic Multiple Live Births   0 0 0 0 2       Psychosocial History:  Social History     Socioeconomic History    Marital status:      Spouse name: Not on file    Number of children: 2    Years of education: Not on file    Highest education level: Not on file   Occupational History    Occupation: teacher     Comment: elementary   Tobacco Use    Smoking status: Every Day     Current packs/day: 0.50     Average packs/day: 0.5 packs/day for 0.2 years (0.1 ttl pk-yrs)     Types: Cigarettes     Start date:     Smokeless tobacco: Never   Vaping Use    Vaping status: Never Used   Substance and Sexual Activity    Alcohol use: Yes     Alcohol/week: 1.0 standard drink of alcohol     Types: 1 Glasses of wine per week     Comment: occasionally    Drug use: Yes     Types: Cannabis    Sexual activity: Not on file   Other Topics Concern     Service Not Asked    Blood Transfusions Not Asked    Caffeine Concern Yes     Comment: (Coffee, Soda) 2 cups daily    Occupational Exposure Not Asked    Hobby Hazards Not Asked    Sleep Concern Not Asked    Stress Concern Not Asked    Weight Concern Not Asked    Special Diet Not Asked    Back Care Not Asked    Exercise Not Asked    Bike Helmet Not Asked    Seat Belt Not Asked    Self-Exams Not Asked    Grew up on a farm Not Asked    History of tanning Not Asked    Outdoor occupation Not Asked    Pt has a pacemaker No    Pt has a defibrillator No    Breast feeding Not Asked    Reaction to local anesthetic No   Social History Narrative    Not on file     Social Drivers of Health     Food Insecurity: Not on file   Transportation Needs: Not on file   Housing Stability: Not on file       Allergies:   Allergies[1]    Current Medications:    Current Outpatient Medications:     KRILL OIL OR, Take by mouth., Disp: , Rfl:     Ascorbic Acid  (VITAMIN C OR), Take by mouth., Disp: , Rfl:     Ergocalciferol (VITAMIN D OR), Take by mouth., Disp: , Rfl:     ibuprofen 600 MG Oral Tab, Take 1 tablet (600 mg total) by mouth every 6 hours with food (Patient not taking: Reported on 3/24/2025), Disp: 20 tablet, Rfl: 0    Spacer/Aero-Holding Chambers Does not apply Device, Use with albuterol inhaler (Patient not taking: Reported on 3/24/2025), Disp: 1 each, Rfl: 0    nitrofurantoin monohydrate macro 100 MG Oral Cap, Take 1 capsule (100 mg total) by mouth 2 (two) times daily. (Patient not taking: Reported on 3/24/2025), Disp: 10 capsule, Rfl: 0    Review of Systems:  A comprehensive 10-point ROS completed all negative unless otherwise documented in HPI.     Vital Signs:  /78 (BP Location: Left arm, Patient Position: Sitting, Cuff Size: adult)   Pulse 79   Temp 98.5 °F (36.9 °C) (Tympanic)   Resp 16   Ht 1.727 m (5' 8\")   Wt 65.3 kg (144 lb)   LMP 03/01/2025 (Exact Date)   SpO2 99%   BMI 21.90 kg/m²     Physical Examination:  General: Alert and oriented x 3, not in acute distress.  HEENT: Non-icteric sclera. Oropharynx is clear.   Neck: No palpable lymphadenopathy. Neck is supple.  Breast: Right breast with palpable small lesion in the UOQ ~2 cm. L breast palpable round cyst in the upper outer quadrant. Axilla normal bilaterally.   Chest: Clear to auscultation.  Heart: Regular rate and rhythm. S1 S2 normal.   Abdomen: Soft, non tender, non distended with good bowel sounds.  Extremities: Pedal pulses are present. No edema.  Neurological: Grossly intact.   Lymphatics: No palpable lymphadenopathy in the cervical, supraclavicular, axillary, or inguinal regions.  Psych/Depression: Appropriate mood and affect.     Laboratory:    Lab Results   Component Value Date    WBC 7.0 11/13/2023    RBC 4.55 11/13/2023    HGB 13.5 11/13/2023    HCT 41.2 11/13/2023    MCV 90.5 11/13/2023    MCH 29.7 11/13/2023    MCHC 32.8 11/13/2023    RDW 12.0 11/13/2023    .0  11/13/2023    MPV 9.2 12/17/2018     Lab Results   Component Value Date     11/13/2023    K 4.5 11/13/2023     11/13/2023    CO2 26.0 11/13/2023    BUN 9 11/13/2023    CREATSERUM 0.68 11/13/2023    GLU 94 11/13/2023    CA 9.7 11/13/2023    ALKPHO 65 11/13/2023    ALT 10 11/13/2023    AST 13 11/13/2023    BILT 0.4 11/13/2023    ALB 4.5 11/13/2023    TP 7.5 11/13/2023       Radiology:  US BREAST BIOPSY 2 SITE RIGHT (CPT=19083/98558)    Addendum Date: 3/24/2025    ADDENDUM: Addendum submitted as pathology results have become available.   Surgical/oncologic management recommended for biopsy-proven invasive lobular carcinoma on sampling of right 9 o'clock 6 cm and 9:30 o'clock 5 cm from the nipple masses.  Given patient age and lobular pathology, further evaluation with breast MRI is recommended.   Final Diagnosis: A. Right breast, 9:00, 6 cm from nipple; ultrasound-guided core needle biopsy: * Invasive lobular carcinoma, grade 2 of 3 (Jorgito Histologic Score 6/9: tubular differentiation 3/3, nuclear pleomorphism 2/3, mitotic activity 1/3) * The maximum length of tumor area in the tissue submitted is 5 mm * Background atypical lobular hyperplasia present  B. Right breast, 9:30, 5 cm from nipple; ultrasound-guided core needle biopsy: * Invasive lobular carcinoma with focal ductal differentiation, grade 1 of 3 (Jorgito Histologic Score 5/9: tubular differentiation 2/3, nuclear pleomorphism 2/3, mitotic activity 1/3) * The maximum length of tumor area in the tissue submitted is 9.5 mm * Background atypical lobular hyperplasia present    Dictated by (CST): Zoie Salamanca MD on 3/24/2025 at 6:39 AM     Finalized by (CST): Zoie Salamanca MD on 3/24/2025 at 6:41 AM             Result Date: 3/24/2025  CONCLUSION:   Uneventful ultrasound-guided biopsies of the RIGHT breast masses. Post procedure mammogram demonstrates the clips in the appropriate position.   RECOMMENDATION:  Report will be updated once  pathology results are finalized.    Dictated by (CST): Romulo Rivera DO on 3/18/2025 at 2:18 PM     Finalized by (CST): Romulo Rivera DO on 3/18/2025 at 2:20 PM      [address] [place] [address] [phone]    [facility] POST PROCEDURE IMAGE RIGHT (CPT=77065)    Result Date: 3/18/2025  PROCEDURE: [facility] POST PROCEDURAL IMAGE RIGHT (CPT=77065)  COMPARISON: [facility], [facility] LUCAS 2D+3D DIAGNOSTIC INGRID BILAT (DIB=80339/23247), 3/18/2025, 9:12 AM.  INDICATIONS: N63.10 Breast mass, right  BREAST COMPOSITION: Category C - The breasts are heterogeneously dense, which may obscure small masses.  TECHNIQUE: Routine post procedure mammogram images performed.   FINDINGS: Post procedure mammogram demonstrates the clips in the appropriate positions.  BI-RADS CATEGORY:  ASSESSMENT: POST-PROCEDURE MAMMOGRAM FOR MARKER PLACEMENT.     RECOMMENDATIONS:  Please refer to the same day breast procedure report for further details.   Dictated by (CST): Romulo Rivera DO on 3/18/2025 at 1:48 PM     Finalized by (CST): Romulo Rivera DO on 3/18/2025 at 1:51 PM      [address] [place] [address] [phone]    [facility] LUCAS 2D+3D DIAGNOSTIC INGRID  BILAT (CPT=77066/54909)    Result Date: 3/18/2025  CONCLUSION:     1. At 9:30, 5 cm from the nipple, there is a 1.6 cm mass which is highly suggestive of malignancy.  This probably correlates with the right breast upper outer quadrant architectural distortion noted on the mammogram.  Ultrasound-guided biopsy is recommended.  If postprocedural mammography clip placement does not correspond to the architectural distortion, additional stereotactic biopsy is recommended.  2. At 9 o'clock, 6 cm from the nipple, immediately adjacent to the mass at 9:30, 5 cm from the nipple, there is a 0.7 cm mass which is suspicious for malignancy.  This should be presumed similar pathology to the above mass.   3. No enlarged or abnormal appearing lymph nodes in the right axilla.    BI-RADS CATEGORY:   DIAGNOSTIC CATEGORY 5 - HIGHLY SUGGESTIVE OF MALIGNANCY.   RECOMMENDATIONS:  STEREOTACTIC BREAST BIOPSY: RIGHT BREAST X1 SITE   ULTRASOUND-GUIDED CORE BIOPSY: RIGHT BREAST X1 SITE       PLEASE NOTE: NORMAL MAMMOGRAM DOES NOT EXCLUDE THE POSSIBILITY OF BREAST CANCER.  A CLINICALLY SUSPICIOUS PALPABLE LUMP SHOULD BE BIOPSIED.   For patients over the age of 40, the target due date for the patient's next mammogram has been entered into a reminder system.   Patient received a discharge summary from the technologist after completion of exam. Findings and recommendations were discussed with the patient immediately following exam. Patient verbalized understanding.  Breast marker legend used on images  Triangle = Palpable lump [place] = Skin tag or mole BB = Nipple Linear chin = Scar Square = Pain    Dictated by (CST): Ar Garcia MD on 3/18/2025 at 11:34 AM     Finalized by (CST): Ar Garcia MD on 3/18/2025 at 12:27 PM          US BREAST RIGHT LIMITED (CPT=76642)    Result Date: 3/18/2025  PROCEDURE: US BREAST RIGHT LIMITED (CPT=76642)  COMPARISON: [facility], INGRID LUCAS 2D+3D DIAGNOSTIC INGRID BILAT (VDP=39012/23448), 3/18/2025, 9:12 AM.  [facility], US BREAST LEFT LIMITED  (CPT=76642), 9/28/2021, 9:30 AM.  INDICATIONS: RT breast palp  TECHNIQUE:  Breast ultrasound was performed with evaluation only on specific areas of concern.   FINDINGS: Ultrasound report is dictated under same-day diagnostic mammogram report.  Please refer to the separately dictated same day diagnostic mammogram report for findings and recommendations.       Dictated by (CST): Ar Garcia MD on 3/18/2025 at 11:25 AM     Finalized by (CST): Ar Garcia MD on 3/18/2025 at 11:34 AM            Pathology:    A. Right breast, 9:00, 6 cm from nipple; ultrasound-guided core needle biopsy:   Invasive lobular  carcinoma, grade 2 of 3 (McCaskill Histologic Score 6/9: tubular differentiation 3/3, nuclear pleomorphism 2/3, mitotic activity 1/3)  The maximum length of tumor area in the tissue submitted is 5 mm  Background atypical lobular hyperplasia present     B. Right breast, 9:30, 5 cm from nipple; ultrasound-guided core needle biopsy:   Invasive lobular carcinoma with focal ductal differentiation, grade 1 of 3 (McCaskill Histologic Score 5/9: tubular differentiation 2/3, nuclear pleomorphism 2/3, mitotic activity 1/3)  The maximum length of tumor area in the tissue submitted is 9.5 mm  Background atypical lobular hyperplasia present     Tumor Markers by Immunostaining     Part A  Estrogen receptor (Leica, monoclonal, clone 6 F11) status: 98% (positive, moderate intensity)  Progesterone receptor (Leica, monoclonal, clone 16) status: 99% (positive, strong intensity)  HER-2/lisa (Leica, monoclonal, clone CB11) status: 2+ (equivocal)  Ki-67 (Leica, monoclonal, clone MM1) status: 4% (favorable)     Part B  Estrogen receptor (Leica, monoclonal, clone 6 F11) status: 90% (positive, moderate intensity)  Progesterone receptor (Leica, monoclonal, clone 16) status: 99% (positive, strong intensity)  HER-2/lisa (Leica, monoclonal, clone CB11) status: 2+ (equivocal)  Ki-67 (Leica, monoclonal, clone MM1) status: 4% (favorable)     Cancer Staging   No matching staging information was found for the patient.       Impression & Plan     Right breast multifocal invasive lobular carcinoma, grade 2, ER/AR positive, HER2 2+, Ki-67 4%, [id number]    - 44 year old premenopausal female presenting with self-detected multifocal right breast cancer, HER2 FISH remains pending.   - I reviewed her imaging and pathology in details and discussed the diagnosis, staging and management approach of breast cancer depending on the subtype and clinical stage.   - we discussed that surgical resection is the only curative treatment for breast cancer and that  adjvuant or neoadjuvant chemotehrapy can add significantly to allow for breast conservation and reduce the risk of distant and local recurrence, when indicated.   - surgical options reviewed including breast conservation with lumpectomy followed by radiation versus mastectomy, both approaches carrying similar long term outcomes.  Patient will discuss this in detail with her surgeon.  - given her age, lobular histology and heterogenous breast density, MRI breast will be ordered to further delineate the local extent of her disease and aid in surgical planning. Treatment plan will be finalized after the MRI and HER2 FISH results.   - we will also obtain a left breast ultrasound to further evaluate the palpable lesion in her left upper outer breast. This is probably a benign cyst, based on prior breast imaging.   - her case will be reviewed at the multidisciplinary breast tumor board and she will be referred to surgery and genetics.         90 minutes spent on this encounter, more than 50% of that time was spent counseling the patient and/or on coordination of care.  The diagnosis, prognosis, and general treatment was explained to the patient and the family. All pertinent questions answered to their satisfaction. Emotional support provided.       Thank you for the consultation request and the opportunity to participate in the care of Harper Ham. We will continue to follow and provide further recommendations. Please contact me for any questions or concerns.         Esha Wise MD   [facility] Hematology Oncology            [1]   Allergies  Allergen Reactions    Methimazole [Thiamazole] HIVES    Dayquil [Day Time] ANXIETY    Sulfamethoxazole RASH    Trimethoprim RASH

## 2025-03-25 ENCOUNTER — TELEPHONE (OUTPATIENT)
Age: 45
End: 2025-03-25

## 2025-03-25 NOTE — TELEPHONE ENCOUNTER
NN phoned patient to discuss plan of care and review upcoming appointments. Patient is scheduled for left breast US, bilateral breat MRI and genetics on Wednesday 3/26 as well as Dr. Chapin on 4/2. Patient agreeable to all scheduled appointments. Patient also wanted to update NN as to some symptoms/changes she has noticed over the last few weeks.     Patient explained that she's been having headaches for about 2-3 weeks, usually come on quick and dissipate quickly. Patient state she has no history of migraines. Patient also mentioned some cramping/stiffness in her right neck to shoulder and well as some pain in her right leg today. NN reassured patient that these symptoms are very unlikely to be related to cancer diagnosis, but NN will update Dr. Wise. NN will contact patient if MD thinks anything needs to be done or further assessed.    Patient appreciative of the call and encouraged to reach out with questions or concerns.

## 2025-03-26 ENCOUNTER — HOSPITAL ENCOUNTER (OUTPATIENT)
Dept: MRI IMAGING | Facility: HOSPITAL | Age: 45
Discharge: HOME | End: 2025-03-26
Attending: INTERNAL MEDICINE
Payer: COMMERCIAL

## 2025-03-26 ENCOUNTER — NURSE ONLY (OUTPATIENT)
Age: 45
End: 2025-03-26
Attending: INTERNAL MEDICINE
Payer: COMMERCIAL

## 2025-03-26 ENCOUNTER — OFFICE VISIT (OUTPATIENT)
Age: 45
End: 2025-03-26
Attending: INTERNAL MEDICINE
Payer: COMMERCIAL

## 2025-03-26 ENCOUNTER — HOSPITAL ENCOUNTER (OUTPATIENT)
Dept: ULTRASOUND IMAGING | Facility: HOSPITAL | Age: 45
Discharge: HOME | End: 2025-03-26
Attending: INTERNAL MEDICINE
Payer: COMMERCIAL

## 2025-03-26 DIAGNOSIS — R92.333 HETEROGENEOUSLY DENSE TISSUE OF BOTH BREASTS ON MAMMOGRAPHY: ICD-10-CM

## 2025-03-26 DIAGNOSIS — C50.411 MALIGNANT NEOPLASM OF UPPER-OUTER QUADRANT OF RIGHT BREAST IN FEMALE, ESTROGEN RECEPTOR POSITIVE (HCC): Primary | ICD-10-CM

## 2025-03-26 DIAGNOSIS — Z17.0 MALIGNANT NEOPLASM OF UPPER-OUTER QUADRANT OF RIGHT BREAST IN FEMALE, ESTROGEN RECEPTOR POSITIVE (HCC): Primary | ICD-10-CM

## 2025-03-26 DIAGNOSIS — N63.21 MASS OF UPPER OUTER QUADRANT OF LEFT BREAST: ICD-10-CM

## 2025-03-26 DIAGNOSIS — C50.911 INVASIVE LOBULAR CARCINOMA OF RIGHT BREAST IN FEMALE (HCC): ICD-10-CM

## 2025-03-26 PROCEDURE — A9575 INJ GADOTERATE MEGLUMI 0.1ML: HCPCS | Performed by: INTERNAL MEDICINE

## 2025-03-26 PROCEDURE — 76642 ULTRASOUND BREAST LIMITED: CPT | Performed by: INTERNAL MEDICINE

## 2025-03-26 PROCEDURE — 77049 MRI BREAST C-+ W/CAD BI: CPT | Performed by: INTERNAL MEDICINE

## 2025-03-26 RX ORDER — GADOTERATE MEGLUMINE 376.9 MG/ML
15 INJECTION INTRAVENOUS
Status: COMPLETED | OUTPATIENT
Start: 2025-03-26 | End: 2025-03-26

## 2025-03-26 RX ADMIN — GADOTERATE MEGLUMINE 13 ML: 376.9 INJECTION INTRAVENOUS at 13:37:00

## 2025-03-26 NOTE — PROGRESS NOTES
Reason for visit: Mrs. Ham is a 44-year-old woman referred for genetic counseling due to her recent diagnosis of breast cancer.  She was seen today to discuss the option of genetic testing and how this may help with her management and surgical options.  She is currently pending treatment. She is  and is employed as an . She and her  have two teenaged daughters together.  Referring MD: Billie    Medical History: Mrs. Ham is a 44-year-old woman who recently was diagnosed with a right breast cancer.  She was 44 at the time of her diagnosis. She is pending treatment.   The tumor markers of her right breast cancer ([facility]) are ER+/MT+/Kgm1jxt equivocal (FISH pending).  She understands that genetic testing may change her medical management.     Other medical history of note: Mrs. Ham reports she is overall in good health aside from this recent diagnosis.  She has not yet had a colonoscopy and the merits of this or FIT testing were reviewed.  She denies any gynecologic surgery and has her ovaries and her uterus intact.  She is yosi-menopausal.  She denies uterine fibroids or current dermatological concerns but has been diagnosed with hyperthyroidism.  She was 12 at the time of menarche and was 26 at the birth of her elder daughter.  She denies the use of hormone replacement therapy or oral contraceptive use.  She admits to current tobacco use but denies any regular consumption of  alcoholic beverages.  Both of her daughters are healthy and well by her report.    Relevant family history: Mrs. Ham is unaware of malignancies on either side of her family.  She does have limited family structure on her maternal side of her family. She does not believe that any family members have pursued genetic testing to date.  She describes her ancestry as [place] on both sides of her family.  She denies any knowledge of Ashkenazi Nondenominational heritage or consanguinity.      Summary:  Most breast  cancer is not hereditary; however, hereditary cancer is suspected under certain conditions, such as when breast cancer occurs prior to the age of 50 (or premenopausal), when there are multiple affected family members, when breast cancer occurs in combination with other cancers, especially ovarian cancer, and when cancer appears to be passing from generation to generation. We also reviewed how limited family structure can mask those families with hereditary cancer predisposition.  We discussed dominant inheritance, the pattern in which most hereditary cancer conditions are inherited.  If an individual has such a cancer predisposing gene mutation, there is a 50% chance that any offspring will not inherit the mutation and a 50% chance that he or she will inherit it.  Inheriting the mutation does not automatically mean that one will develop cancer, rather that there is an increased chance for developing certain types of cancer.  Cancer predisposing gene mutations can exist in males and females and can be passed on to both male and female offspring.  The pros and cons of cancer predisposing gene mutation testing were reviewed with Mrs. Ham.  Genetic test results can have significant impact on medical management, planning, screening, surgical decision-making, cancer risk assessment for the patient and relatives, and psychological/emotional well-being.  Mutations in the genes BRCA1 and BRCA2 account for most cases (but not all) of hereditary breast cancer.  Mutations in other genes have also been associated with an increased risk for breast cancer - but mutations in these other genes are statistically less often seen in hereditary breast cancer.    We discussed the benefits and limitations of BRCA1/2 testing versus multi-gene panels that include BRCA1/2 as well as other genes associated with different cancers, such as colon cancer.  Panels are an appropriate option for individuals whose history is suggestive of more  than one syndrome, and they improve detection rate for identifying the underlying cause of a hereditary cancer.  Limitations of panels include an unknown percentage of variants of unknown significance, as well as an uncertainty of level of risk associated with certain unknown-penetrance genes, and therefore lack of clear guidelines for risk management of carriers of some of these mutations.  There are panels that assess for mutations in only “high risk” and clinically actionable breast cancer genes as well as larger panels that assess for mutations in high, moderate and unknown-penetrance breast cancer genes.  Genetic testing could yield one of three results: no mutation detected, deleterious mutation detected, or variant of uncertain significance.  The implications of these potential results were reviewed with Mrs. Ham.  Given her early age at diagnosis of breast cancer and given that the results may impact her surgical management, she meets NCCN criteria for HBOC.    Based on the above history and our discussion of genetic testing options, Mrs. Ham decided to proceed with a hereditary cancer panel through [facility], which encompasses 9 genes associated with high risk of breast cancers which is available on a STAT basis ([facility] Breast Cancer STAT panel with LUIS and CHEK2 added on).  Blood was drawn at her visit today and sent to [facility].  Turn-around-time is typically 7-14 days from receipt of the sample.  My office will call Mrs. Ham as soon as results are received; post-test counseling can be scheduled at that time.        Thank you for referring Mrs. Ham to Genetics; please do not hesitate to contact my office if you have any questions or concerns, [phone].  Plan:  Blood was drawn and sent out for [facility] Breast Cancer STAT Panel plus LUIS and CHEK2 testing through [facility].   The Genetics office will call Mrs. Ham when her results are available.  Recommendations  for Mrs. Ham and family members will depend on above test results.    Send to: Dr. Wise  Time spent managing patient care: 45 minutes

## 2025-03-27 ENCOUNTER — TELEPHONE (OUTPATIENT)
Age: 45
End: 2025-03-27

## 2025-03-27 NOTE — TELEPHONE ENCOUNTER
NN phoned patient to review HER2 result and MRI findings. NN informed patient that HER2 was negative, patient happy to hear that. NN also gave brief overview of MRI explaining that the masses measured larger on MRI than they did on mammogram and there is a large span of satellite lesions. NN provided measurements for masses and satellite lesions. NN explained finding of equivocal enhancement near internal mammary lymph node chain. NN provided feedback from Dr. Wise that this imaging will be further reviewed by Dr. Chapin when she returns to office to help determine if additional imaging is needed. NN informed patient that as of right now, the results do not change anything systemically, but may impact possibility of lumpectomy. Patient expressed that she is interested in mastectomy, unsure bilateral or unilateral. Patient interested in meeting with plastic surgery team. NN will help to coordinate that.     Patient appreciative of the call and encouraged to reach out with questions or concerns.

## 2025-03-31 ENCOUNTER — OFFICE VISIT (OUTPATIENT)
Facility: CLINIC | Age: 45
End: 2025-03-31
Payer: COMMERCIAL

## 2025-03-31 ENCOUNTER — TELEPHONE (OUTPATIENT)
Age: 45
End: 2025-03-31

## 2025-03-31 VITALS
WEIGHT: 144.38 LBS | RESPIRATION RATE: 18 BRPM | SYSTOLIC BLOOD PRESSURE: 113 MMHG | BODY MASS INDEX: 21.88 KG/M2 | TEMPERATURE: 98 F | HEART RATE: 72 BPM | HEIGHT: 68 IN | OXYGEN SATURATION: 98 % | DIASTOLIC BLOOD PRESSURE: 74 MMHG

## 2025-03-31 DIAGNOSIS — Z17.0 MALIGNANT NEOPLASM OF UPPER-OUTER QUADRANT OF RIGHT BREAST IN FEMALE, ESTROGEN RECEPTOR POSITIVE (HCC): ICD-10-CM

## 2025-03-31 DIAGNOSIS — C50.919 INVASIVE LOBULAR CARCINOMA OF BREAST IN FEMALE (HCC): Primary | ICD-10-CM

## 2025-03-31 DIAGNOSIS — C50.411 MALIGNANT NEOPLASM OF UPPER-OUTER QUADRANT OF RIGHT BREAST IN FEMALE, ESTROGEN RECEPTOR POSITIVE (HCC): Primary | ICD-10-CM

## 2025-03-31 DIAGNOSIS — C50.411 MALIGNANT NEOPLASM OF UPPER-OUTER QUADRANT OF RIGHT BREAST IN FEMALE, ESTROGEN RECEPTOR POSITIVE (HCC): ICD-10-CM

## 2025-03-31 DIAGNOSIS — Z17.0 MALIGNANT NEOPLASM OF UPPER-OUTER QUADRANT OF RIGHT BREAST IN FEMALE, ESTROGEN RECEPTOR POSITIVE (HCC): Primary | ICD-10-CM

## 2025-03-31 PROCEDURE — 99205 OFFICE O/P NEW HI 60 MIN: CPT | Performed by: SURGERY

## 2025-03-31 PROCEDURE — 3078F DIAST BP <80 MM HG: CPT | Performed by: SURGERY

## 2025-03-31 PROCEDURE — 3008F BODY MASS INDEX DOCD: CPT | Performed by: SURGERY

## 2025-03-31 PROCEDURE — 3074F SYST BP LT 130 MM HG: CPT | Performed by: SURGERY

## 2025-03-31 NOTE — PROGRESS NOTES
Breast Surgery New Patient Consultation    This is the first visit for this 44 year old woman, referred by Dr. Julian, who presents for evaluation of Right breast cancer.    History of Present Illness:   Ms. Harper Ham is a 44 year old woman who presents with a self detected mass the right breast.  She states she noticed this a few weeks ago.  She denies any nipple discharge, skin changes or axillary symptoms.  She does have a palpable concern on the left breast that she has a known left breast cyst in this region.  She has no known family history of breast cancer.  She has no personal prior history of breast disease or biopsies.  Given the new palpable concern of the right breast she had a bilateral diagnostic evaluation on March 18, 2025 that showed a suspicious 7 mm mass at 9:00 as well as a suspicious 1.6 cm mass at 930 and she was recommended for biopsy of the dominant 1 with presumed similar pathology to the second site pending review of that.  She was noted to have normal-appearing right axillary lymph nodes.  She also had a left breast ultrasound that showed a 1.6 cm benign simple cyst at 3:00.  She had her biopsy completed on March 20, 2025 and was confirmed to have an invasive lobular carcinoma at both biopsy site locations that was noted to be ER/MD positive, HER2/lisa negative by FISH with a Ki-67 of only 4%.  She has met with our genetic counselor the results of which are pending.  She had an MRI on March 26, 2025 that showed extensive enhancement including what appeared to be multicentric disease measuring 7.3 x 6.1 x 4.3 cm in the right breast.  She was noted to have no concerns in the left breast.  She was incidentally found to have a 6 mm right internal mammary chain lymph node.  Medical oncology has recommended a PET scan to look at the internal mammary lymph node which is pending. She is here today for evaluation and recommendations for further therapy.        Past Medical History:    Anxiety     Breast CA (HCC)    Chronic UTI    Chronic UTI, congenital anomaly, s/p urethral reconstruction    Contraception    IUD    Hyperthyroidism    Was on meds briefly, off meds    Kidney stones    Medullary sponge kidney    Pregnancy (HCC)    Pregnancy; Management:  4 hr labor ; Facility:  [facility]; Provider:  Hermila Núñez; Outcome/detail:  40 week 8 lb(S) 6oz Female; Comments:  Rachel (JLK)\"    Pregnancy (HCC)    Pregnancy; Management:   - ANNALEE Cuellar; Provider:  Young Huerta; Comments:  APGARS 9 (1 min), 9 (5 min) - 1st degree perineal laceration\"    Psoriasis    Mostly scalp    Vertigo    Visual impairment    contacts       Past Surgical History:   Procedure Laterality Date    Cyst aspiration left Left         Incise external hemorrhoid  2013    L posterior quad    Incise external hemorrhoid  10/29/2015    R posterior quad    Needle biopsy right Left 2025    2 site    Skin surgery Left 10/2015    wart removal    Tubal ligation      Urology surgery procedure unlisted      Uretheral reconstruction       Gynecological History:  Pt is a   Pt was 26 years old at time of first pregnancy.    She has cumulative breastfeeding history of 20 months.  She achieved menarche at age 12 and LMP 3/28/25  She denies any history of hormone replacement therapy .  She has history of oral contraceptive use for 2 months , last in .  She denies infertility treatment to achieve pregnancy.    Medications:    No outpatient medications have been marked as taking for the 3/31/25 encounter (Appointment) with Mel Chapin MD.       Allergies:    Allergies[1]    Family History:   Family History   Problem Relation Age of Onset    Hypertension Father     Kidney Disease Father         ESRD on HD, s/p transplant    Thyroid Disorder Father     Thyroid Disorder Mother     Other (Kidney stones) Mother     Breast Cancer Self     Other (Inflammatory bowel disease) Other         No Family h/o Inflammatory bowel  disease    Ovarian Cancer Neg     Prostate Cancer Neg     Pancreatic Cancer Neg        She is not of Ashkenazi Voodoo ancestry.    Social History:  History   Alcohol Use    1.0 standard drink of alcohol/week    1 Glasses of wine per week     Comment: occasionally       History   Smoking Status    Every Day    Types: Cigarettes   Smokeless Tobacco    Never     Ms. Harper Ham is  with 2 children. She has 1 siblings. She is currently Employed full-time    Review of Systems:  General:   The patient denies, fever, chills, night sweats, fatigue, generalized weakness, change in appetite or weight loss.    HEENT:     The patient denies eye irritation, cataracts, redness, glaucoma, yellowing of the eyes, change in vision, color blindness, or wearing contacts/glasses. The patient denies hearing loss, ringing in the ears, ear drainage, earaches, nasal congestion, nose bleeds, +snoring, pain in mouth/throat, hoarseness, change in voice, facial trauma.    Respiratory:  The patient denies chronic cough, +phlegm, hemoptysis, pleurisy/chest pain, pneumonia, asthma, wheezing, difficulty in breathing with exertion, emphysema, chronic bronchitis, shortness of breath or abnormal sound when breathing.     Cardiovascular:  There is no history of chest pain, chest pressure/discomfort, palpitations, irregular heartbeat, fainting or near-fainting, difficulty breathing when lying flat, SOB/Coughing at night, swelling of the legs or chest pain while walking.    Breasts:  See history of present illness    Gastrointestinal:     There is no history of difficulty or pain with swallowing, reflux symptoms, vomiting, dark or bloody stools, constipation, yellowing of the skin, indigestion, nausea, change in bowel habits, diarrhea, abdominal pain or vomiting blood.     Genitourinary:  The patient denies frequent urination, needing to get up at night to urinate, urinary hesitancy or retaining urine, painful urination, urinary incontinence,  decreased urine stream, blood in the urine or vaginal/penile discharge.    Skin:    The patient denies rash, +itching, skin lesions, dry skin, change in skin color or change in moles.     Hematologic/Lymphatic:  The patient denies +easily bruising or bleeding or +persistent swollen glands or lymph nodes.     Musculoskeletal:  The patient denies muscle aches/pain, joint pain, stiff joints, neck pain, back pain or bone pain.    Neuropsychiatric:  There is no history of migraines or severe headaches, seizure/epilepsy, speech problems, coordination problems, trembling/tremors, fainting/black outs, +dizziness, memory problems, loss of sensation/numbness, problems walking, weakness, tingling or burning in hands/feet. There is no history of abusive relationship, bipolar disorder, sleep disturbance, +anxiety, depression or feeling of despair.    Endocrine:    There is no history of poor/slow wound healing, weight loss/gain, fertility or hormone problems, cold intolerance, +thyroid disease.     Allergic/Immunologic:  There is no history of hives, hay fever, angioedema or anaphylaxis.    /74 (BP Location: Right arm, Patient Position: Sitting, Cuff Size: adult)   Pulse 72   Temp 98.3 °F (36.8 °C) (Temporal)   Resp 18   Ht 1.727 m (5' 8\")   Wt 65.5 kg (144 lb 6.4 oz)   LMP 03/01/2025 (Exact Date)   SpO2 98%   BMI 21.96 kg/m²     Physical Exam:  The patient is an alert, oriented, well-nourished and  well-developed woman who appears her stated age. Her speech patterns and movements are normal. Her affect is appropriate.    HEENT: The head is normocephalic. The neck is supple. The thyroid is not enlarged and is without palpable masses/nodules. There are no palpable masses. The trachea is in the midline. Conjunctiva are clear, non-icteric.    Chest: The chest expands symmetrically. The lungs are clear to auscultation.    Heart: The rhythm is regular.  There are no murmurs, rubs, gallops or thrills.    Breasts:  Her  breasts are symmetrical with a cup size C.  Right breast: The skin, nipple ,and areola appear normal. There is no skin dimpling with movement of the pectoralis. There is no nipple retraction. No nipple discharge can be elicited. The parenchyma is mildly nodular. There is ecchymosis in the right upper outer quadrant with an indiscrete palpable hematoma in this location. The axillary tail is normal.  Left breast:   The skin, nipple, and areola appear normal. There is no skin dimpling with movement of the pectoralis. There is no nipple retraction. No nipple discharge can be elicited. The parenchyma is mildly nodular. There are no dominant masses in the breast. The axillary tail is normal.    Abdomen:  The abdomen is soft, flat and non tender. The liver is not enlarged. There are no palpable masses.    Lymph Nodes:  The supraclavicular, axillary and cervical regions are free of significant lymphadenopathy.    Back: There is no vertebral column tenderness.    Skin: The skin appears normal. There are no suspicious appearing rashes or lesions.    Extremities: The extremities are without deformity, cyanosis or edema.    Impression:   Ms. Harper Ham is a 44 year old woman presents with right breast cancer, clinical stage T2 (M) NxMx.    Discussion and Plan:  I had a discussion with the Patient regarding her breast exam. On exam today I found her to be healing well since recent biopsy with no other clinical findings.  I personally reviewed the recent imaging and pathology and we discussed this at length.    The natural history and evolution of breast cancer were discussed with Ms. Harper Ham and her  family, including the difference between in-situ and invasive carcinoma, and the distinction  between local and systemic disease and local and systemic therapy. For local treatment options,  I explained the risks and benefits of breast conservation and mastectomy (with or without  reconstruction), including the fact that  survival rates are equal with these two approaches.  If breast conservation is elected, I explained the need for free margins, the possibility of re-  excision to achieve free margins, and the need for post-operative radiotherapy. The approach  to rober staging was also described, including the technique, risks and benefits of sentinel node  biopsy, the possible need for axillary dissection, and the long-term sequelae of this procedure.  With regard to systemic therapy, final recommendation will be made following receipt of final  pathology post-op, but I outlined the possibility of endocrine therapy, chemotherapy, and  herceptin, depending on tumor marker profile.  Following this discussion, where all of the patient's questions were answered, we agreed to  proceed with PET scan to look at her internal mammary lymph node.  Given the greater than 7 cm span of disease she is not a good candidate for breast conservation.  She is motivated for a bilateral mastectomy for maximal risk reduction independent of her pending genetic testing results.  She would like to meet with plastic surgery to discuss reconstructive options.  From my perspective she is a candidate for a bilateral nipple versus skin sparing mastectomy with right sentinel lymph node biopsy and possible right axillary lymph node dissection. The risks and possible complications of the procedure were explained to the patient and her family and she understood and agreed to the proposed plan. She was given ample opportunity for questions and those questions were answered to her satisfaction. She has been  encouraged to contact the office with any questions or concerns prior to her next appointment.     This note was created by Dragon voice recognition. Errors in content may be related to improper recognition by the system; efforts to review and correct have been done but errors may still exist. Please be advised the primary purpose of this note is for me to  communicate medical care. Standard sentence structure is not always used. Medical terminology and medical abbreviations may be used. There may be grammatical, typographical, and automated fill ins that may have errors missed in proofreading.             [1]   Allergies  Allergen Reactions    Methimazole [Thiamazole] HIVES    Dayquil [Day Time] ANXIETY    Sulfamethoxazole RASH    Trimethoprim RASH

## 2025-03-31 NOTE — PATIENT INSTRUCTIONS
Dr. Mel Chapin  Tel: [phone]  Fax: [phone] [facility]  [address], [place] [address]  [phone]       Surgery/Procedure: Bilateral breast nipple versus skin sparing mastectomies, right lymphoscintigraphy, right sentinel lymph node biopsy, possible right axillary lymph node dissection ([place]) with reconstruction (plastics)        Anesthesia:   Gen + intra-operative pec-block from anesthesia  Surgery Length:   2.5 hours CPT:  78341, 15851, 72320   Wire LOC:   No   Nuc Med:   Yes Linda Seed:  No       Dx & ICD-10: Invasive lobular carcinoma of breast in female (HCC) (C50.919)   Radiology Instructions: N/A   _______________________________________________________________________________    Someone must accompany you the day of the procedure to drive you home safely, because of anesthesia.  You will need an adult  to stay with you the first night following your surgery.  You must remove any kind of makeup, acrylic nails, lotions, powders, creams or deodorant.  [facility] ONLY: Pre-admission will give instruct you on when to take Gatorade and Tylenol/acetaminophen prior to your surgery, purchase 2 - 12oz bottles of regular Gatorade (NOT RED/SUGAR FREE). Otherwise, you may not eat or drink anything else after 11PM the night before surgery.    [place] ONLY: You may not eat or drink anything after midnight the day of your surgery.   Wear comfortable clothing that can be easily removed.  If you wear dentures, contacts lenses, or any prosthesis, you will be asked to remove them.  Do not drink alcohol or smoke 24 hours prior to your procedure.  Bring a picture ID and your insurance card.  Covid-19 testing is no longer required before surgery unless you are experiencing symptoms such as fever, cough, congestion, etc.   The Pre-Admission Testing Department will call the day before to confirm your procedure, give you the time you need to arrive by and directions on where to go. They  begin making calls after 2pm, if you are not contacted by 4pm, please call the surgeon's office listed above.  Do not take any blood thinners at least one week prior to the procedure/surgery. This includes aspirin, baby aspirin, Ibuprofen products, herbal supplements, diet medications, vitamin E, fish oil and green tea supplements. Please check other supplements for these ingredients. *TYLENOL or acetaminophen is acceptable*  If you take Coumadin, Plavix, Xarelto, or Eliquis, please contact your prescribing physician for special instructions on how long to hold. If you take insulin contact your primary care physician for special instructions.  Our surgery scheduler, Harini, will be contacting you to discuss surgery dates. If you have any questions related to scheduling your surgery, please reach out to her at [phone].  _____________________________________________________________________  PRE-OPERATIVE TESTING IF INDICATED BELOW  PLEASE COMPLETE ASAP (AT LEAST 14-21 DAYS PRIOR TO SURGERY)  [] CBC [] BMP [] CMP [] EKG    [] PT, PTT, INR [] Cardiac Clearance  [] H&P Medical Clearance [] Chest X-ray     Please call Central Scheduling to schedule an appointment for pre-operative labs/tests once your orders are placed @ [phone]  Does the patient have a pacemaker or ICD?                              Faxitron/Trident in OR?  [] Yes   [x] No                               [] Yes   [x] No

## 2025-03-31 NOTE — TELEPHONE ENCOUNTER
Call placed to patient. Left message for patient to call back and ask for triage.     NN phoned patient to offer sooner appointment. Patient now scheduled to see Dr. Chapin, today 3/31 at 2:30. NN provided instruction as to where to park and reminded patient to bring new patient paperwork to appointment.     Patient appreciative of the call and encouraged to reach out with questions or concerns.   .

## 2025-04-01 ENCOUNTER — HOSPITAL ENCOUNTER (OUTPATIENT)
Dept: NUCLEAR MEDICINE | Facility: HOSPITAL | Age: 45
Discharge: HOME | End: 2025-04-01
Attending: INTERNAL MEDICINE
Payer: COMMERCIAL

## 2025-04-01 ENCOUNTER — TELEPHONE (OUTPATIENT)
Facility: CLINIC | Age: 45
End: 2025-04-01

## 2025-04-01 ENCOUNTER — TELEPHONE (OUTPATIENT)
Dept: SURGERY | Facility: CLINIC | Age: 45
End: 2025-04-01

## 2025-04-01 ENCOUNTER — DOCUMENTATION ONLY (OUTPATIENT)
Facility: CLINIC | Age: 45
End: 2025-04-01

## 2025-04-01 DIAGNOSIS — C50.919 INVASIVE LOBULAR CARCINOMA OF BREAST IN FEMALE (HCC): Primary | ICD-10-CM

## 2025-04-01 DIAGNOSIS — Z17.0 MALIGNANT NEOPLASM OF UPPER-OUTER QUADRANT OF RIGHT BREAST IN FEMALE, ESTROGEN RECEPTOR POSITIVE (HCC): ICD-10-CM

## 2025-04-01 DIAGNOSIS — C50.411 MALIGNANT NEOPLASM OF UPPER-OUTER QUADRANT OF RIGHT BREAST IN FEMALE, ESTROGEN RECEPTOR POSITIVE (HCC): ICD-10-CM

## 2025-04-01 DIAGNOSIS — C50.911 INVASIVE LOBULAR CARCINOMA OF RIGHT BREAST IN FEMALE (HCC): Primary | ICD-10-CM

## 2025-04-01 PROCEDURE — 78815 PET IMAGE W/CT SKULL-THIGH: CPT | Performed by: INTERNAL MEDICINE

## 2025-04-01 NOTE — TELEPHONE ENCOUNTER
Calling pt in regards to scheduling surgery.  Informed pt that I have 05/12/2025 available at [facility] with Dr. Chapin/Dr. Meyers.  Pt verbalized understanding and in agreement with date and location.  All questions answered.   Encouraged pt to call or [facility] message office with any other questions or concerns.

## 2025-04-01 NOTE — PATIENT INSTRUCTIONS
This is not a pre-op    Surgeon:         Dr. Dejan Meyers                                        Tel:         [phone]                                  Fax:        [phone]    Surgery/Procedure: Immediate breast reconstruction with placement of bilateral breast tissue expanders and acellular dermal matrix. 1.5 hours, general anesthesia, outpatient. Joint with Dr. Chapin.  Please request pre-op pec block per anesthesia.        Dx Code: C50.411, Z17.0     Hospital:  [facility]: [address] [place] [address]           [phone]  [facility]: [address] [place] [address]               [phone]    1. Someone will need to drive you to and from the hospital if your procedure is outpatient.    2.Do not drink alcohol or smoke 24 hours prior to your procedure.    3. Bring a picture ID and your insurance card.    4. You will be contacted by the hospital the day before to confirm the procedure time and location.     5. Do not take any herbal supplements or blood thinners at least one week before your procedure/surgery. This includes NSAID's (aspirin, baby aspirin, Motrin, Ibuprofen, Aleve, Advil, Naproxen, etc), Fish oil, vitamin E, turmeric, CoQ10, or green tea supplements, etc. *TYLENOL or acetaminophen is ok to take*    6. PRE-OPERATIVE TESTING: History and physical with medical clearance is REQUIRED within 30 days of the surgery date and is mandatory per Dr. Meyers. *If this is not done, your surgery will be postponed. To avoid delays with your clearance, please ensure your PCP appointment is at least 14 days prior to your surgical date.*  MEDICAL CLEARANCE WITH  ____  CBC  CMP  EKG (within 90 days)    7. If you take Coumadin, Plavix, Xarelto, or Eliquis, please contact your prescribing physician for special instructions on how long to hold. If you take insulin, contact your primary care physician for special instructions.     8. Please inform us if you start or change  any medications at least one week before surgery (ex: blood thinners, weight loss medications, diabetic medications, herbal supplements, etc)    9. Does patient have diagnosis of sleep apnea?    [   ] Yes     [   ]  No    Consent obtained  Photos taken on ______

## 2025-04-01 NOTE — TELEPHONE ENCOUNTER
Called patient to encourage her to pursue smoking cessation to ensure she is in the best health for upcoming surgery and potential reconstruction. Patient stated she is actively trying to quit and has cut down her daily cigarettes form 8 to 5 per day. Encouraged her to keep moving in the right direction, confirmed her upcoming appointment with Dr. Meyers.

## 2025-04-02 ENCOUNTER — TELEPHONE (OUTPATIENT)
Age: 45
End: 2025-04-02

## 2025-04-02 NOTE — TELEPHONE ENCOUNTER
Shared genetic testing results with Harper. 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 Released results to her through lab's portal and will mail her a copy. All her questions were answered to the best of my ability and she was reassured by these results and appreciative of the call.

## 2025-04-03 ENCOUNTER — TELEPHONE (OUTPATIENT)
Dept: FAMILY MEDICINE CLINIC | Facility: CLINIC | Age: 45
End: 2025-04-03

## 2025-04-03 NOTE — TELEPHONE ENCOUNTER
NN returned call to patient regarding recent PET scan. NN informed patient that PET scan was clear and that no additional testing or imaging is needed. Patient was relieved. NN asked if patient has had a chance to talk with Julia Flores yet. Patient informed RN that she received request to schedule telemedicine visit, but ran into issues with insurance. NN will have  look into it. Patient informed NN that she is actively working to quit smoking, has only had two today so far. But patient admits this has been causing her a lot of stress and anxiety. She's very worried about timeline of quitting and how it will impact her ability to have surgery. NN validated patient's feelings and encouraged her to reach out to the [facility] as they may have some free resources. NN also encouraged patient to discuss with Dr. Meyers during consultation on 4/15. NN reassured patient that she is doing a great job and is taking the actions necessary to quit smoking. Patient expressed sincere gratitude and appreciation for NN.       NN encouraged patient to reach out when needed.

## 2025-04-07 ENCOUNTER — OFFICE VISIT (OUTPATIENT)
Dept: FAMILY MEDICINE CLINIC | Facility: CLINIC | Age: 45
End: 2025-04-07
Payer: COMMERCIAL

## 2025-04-07 VITALS
BODY MASS INDEX: 21.98 KG/M2 | DIASTOLIC BLOOD PRESSURE: 75 MMHG | OXYGEN SATURATION: 98 % | SYSTOLIC BLOOD PRESSURE: 119 MMHG | WEIGHT: 145 LBS | HEIGHT: 68 IN | HEART RATE: 73 BPM

## 2025-04-07 DIAGNOSIS — F41.9 ANXIETY: ICD-10-CM

## 2025-04-07 DIAGNOSIS — Z12.11 SCREENING FOR MALIGNANT NEOPLASM OF COLON: ICD-10-CM

## 2025-04-07 DIAGNOSIS — E05.90 HYPERTHYROIDISM: ICD-10-CM

## 2025-04-07 DIAGNOSIS — Z17.0 MALIGNANT NEOPLASM OF UPPER-OUTER QUADRANT OF RIGHT BREAST IN FEMALE, ESTROGEN RECEPTOR POSITIVE (HCC): ICD-10-CM

## 2025-04-07 DIAGNOSIS — E55.9 VITAMIN D DEFICIENCY: ICD-10-CM

## 2025-04-07 DIAGNOSIS — Z00.01 ENCOUNTER FOR GENERAL ADULT MEDICAL EXAMINATION WITH ABNORMAL FINDINGS: Primary | ICD-10-CM

## 2025-04-07 DIAGNOSIS — C50.411 MALIGNANT NEOPLASM OF UPPER-OUTER QUADRANT OF RIGHT BREAST IN FEMALE, ESTROGEN RECEPTOR POSITIVE (HCC): ICD-10-CM

## 2025-04-07 NOTE — PROGRESS NOTES
Subjective:   Harper Ham is a 44 year old female who presents for Physical (Annual Physical ).     HPI     Patient is here for routine general physical exam.  Patient was recently diagnosed with malignant neoplasm of upper outer quadrant of the right breast.  Patient scheduled to have surgery on 5/12/2025 for a double mastectomy, patient will come back for preop clearance.  Patient is under care of breast oncologist and med oncologist.    Patient is requesting blood testing. Diet and exercise have been fair.  Past medical history, family history, and social history were reviewed.    History/Other:      Chief Complaint Reviewed and Verified  Nursing Notes Reviewed and   Verified  Tobacco Reviewed  Allergies Reviewed  Medications Reviewed    Problem List Reviewed  Medical History Reviewed  Surgical History   Reviewed  OB Status Reviewed  Family History Reviewed  Social History   Reviewed           Tobacco:  Social History     Tobacco Use   Smoking Status Every Day    Current packs/day: 0.50    Average packs/day: 0.5 packs/day for 0.3 years (0.1 ttl pk-yrs)    Types: Cigarettes    Start date: 2025   Smokeless Tobacco Never     E-Cigarettes/Vaping       Questions Responses    E-Cigarette Use Never User           Tobacco cessation counseling for 3-10 minutes (add E/M code #00300).        Current Outpatient Medications   Medication Sig Dispense Refill    KRILL OIL OR Take by mouth.      Ascorbic Acid (VITAMIN C OR) Take by mouth.      Ergocalciferol (VITAMIN D OR) Take by mouth.         Allergies[1]    Depression Screening (PHQ-2/PHQ-9): Over the LAST 2 WEEKS                         Review of Systems   Constitutional:  Positive for fatigue. Negative for activity change.   HENT: Negative.  Negative for congestion, ear pain, rhinorrhea and sneezing.    Eyes: Negative.  Negative for redness.   Respiratory: Negative.  Negative for cough, shortness of breath and wheezing.    Cardiovascular: Negative.  Negative  for chest pain.   Gastrointestinal: Negative.  Negative for abdominal pain, constipation, diarrhea, nausea and vomiting.   Endocrine: Negative.    Genitourinary: Negative.  Negative for difficulty urinating and frequency.   Musculoskeletal: Negative.  Negative for back pain, joint swelling and myalgias.   Skin: Negative.  Negative for rash.   Allergic/Immunologic: Negative.    Neurological: Negative.  Negative for dizziness, syncope, light-headedness and headaches.   Hematological: Negative.    Psychiatric/Behavioral: Negative.           Objective:   /75 (BP Location: Right arm, Patient Position: Sitting, Cuff Size: adult)   Pulse 73   Ht 5' 8\" (1.727 m)   Wt 145 lb (65.8 kg)   LMP 03/28/2025 (Exact Date)   SpO2 98%   BMI 22.05 kg/m²  Estimated body mass index is 22.05 kg/m² as calculated from the following:    Height as of this encounter: 5' 8\" (1.727 m).    Weight as of this encounter: 145 lb (65.8 kg).      Physical Exam  Vitals and nursing note reviewed.   Constitutional:       Appearance: Normal appearance. She is normal weight.   HENT:      Head: Normocephalic and atraumatic.      Right Ear: Tympanic membrane normal.      Left Ear: Tympanic membrane normal.      Nose: Nose normal.      Mouth/Throat:      Mouth: Mucous membranes are moist.      Pharynx: Oropharynx is clear.   Eyes:      Extraocular Movements: Extraocular movements intact.      Conjunctiva/sclera: Conjunctivae normal.      Pupils: Pupils are equal, round, and reactive to light.   Cardiovascular:      Rate and Rhythm: Normal rate and regular rhythm.      Pulses: Normal pulses.      Heart sounds: Normal heart sounds.   Pulmonary:      Effort: Pulmonary effort is normal.      Breath sounds: Normal breath sounds.   Abdominal:      General: Abdomen is flat. Bowel sounds are normal.      Palpations: Abdomen is soft.   Musculoskeletal:         General: Normal range of motion.      Cervical back: Normal range of motion and neck supple.    Skin:     General: Skin is warm and dry.   Neurological:      General: No focal deficit present.      Mental Status: She is alert and oriented to person, place, and time. Mental status is at baseline.   Psychiatric:         Mood and Affect: Mood normal.         Behavior: Behavior normal.         Thought Content: Thought content normal.         Judgment: Judgment normal.           Assessment & Plan:     Assessment & Plan  Encounter for general adult medical examination with abnormal findings  -Exam is unremarkable  -Screening tests/lab were discussed   -Discussed with the patient about age appropriate screening and immunization.  -Advised healthy lifestyle with regular exercise and modification of the diet  -Advised 30 minutes of aerobic exercise or weightbearing exercise daily for at least 5 times a week.  -Advised to increase the intake of vegetables, fibers and lean protein.   -Avoid high fat, high cholesterol, high carb diet.  -Avoid processed, frozen food and sweetened beverages.  -Advised to call for any new problems and follow up for further management after testings have been done   Orders:    CBC With Differential With Platelet; Future    Comp Metabolic Panel (14); Future    Hemoglobin A1C    Lipid Panel; Future    TSH W Reflex To Free T4    Vitamin D; Future    Malignant neoplasm of upper-outer quadrant of right breast in female, estrogen receptor positive (HCC)  EKG and chest x-ray ordered patient to get that done prior to surgery  Follow recommendations per oncologist  Orders:    EKG 12 Lead; Future    XR CHEST PA + LAT CHEST (CPT=71046); Future    Hyperthyroidism  -stable continue current management  TSH ordered  Orders:    TSH W Reflex To Free T4    Anxiety  -Advised patient to take medication as directed and follow up with psychologist/psychiatrist/counselor  -Education provided on daily exercise, and proper sleep  -Discussed about decreasing the amount of caffeine daily and cut out alcohol  -Patient  can reach out to family, friends, [employer] groups for support as needed     Orders:    CBC With Differential With Platelet; Future    TSH W Reflex To Free T4    Vitamin D deficiency    Orders:    Vitamin D; Future    Screening for malignant neoplasm of colon    Orders:    GASTRO - INTERNAL       Medication use, effects and side effects discussed in detail with patient. The patient  indicated understanding of the diagnosis and agreed with the plan of care.    Return in about 4 weeks (around 5/5/2025).    CLEMENTINA Londono         [1]   Allergies  Allergen Reactions    Methimazole [Thiamazole] HIVES    Dayquil [Day Time] ANXIETY    Sulfamethoxazole RASH    Trimethoprim RASH

## 2025-04-07 NOTE — ASSESSMENT & PLAN NOTE
-Advised patient to take medication as directed and follow up with psychologist/psychiatrist/counselor  -Education provided on daily exercise, and proper sleep  -Discussed about decreasing the amount of caffeine daily and cut out alcohol  -Patient can reach out to family, friends, [employer] groups for support as needed     Orders:    CBC With Differential With Platelet; Future    TSH W Reflex To Free T4

## 2025-04-08 ENCOUNTER — PATIENT OUTREACH (OUTPATIENT)
Age: 45
End: 2025-04-08

## 2025-04-08 ENCOUNTER — TELEPHONE (OUTPATIENT)
Dept: GENERAL RADIOLOGY | Facility: HOSPITAL | Age: 45
End: 2025-04-08

## 2025-04-08 NOTE — TELEPHONE ENCOUNTER
0905: Spoke with Harper Ham   Introduced myself as one of the breast nurse coordinators at [facility] and informed Ms. Ham of the purpose of my call.  Discussed sentinel lymph node mapping procedure to be done in the nuclear medicine department prior to surgery with Dr. Chapin on Monday, May 12.  Procedure and flow of the day reviewed. All questions answered to the best of my ability.  Harper Ham verbalized understanding   Encouraged Ms. Ham to contact the breast center or Dr. Chapin's office if she has questions or concerns prior to surgery.  Harper Ham verbalized agreement and appreciation for the call.

## 2025-04-11 ENCOUNTER — TELEPHONE (OUTPATIENT)
Dept: SURGERY | Facility: CLINIC | Age: 45
End: 2025-04-11

## 2025-04-11 NOTE — TELEPHONE ENCOUNTER
Called patient to remind her to contact her PCP to make a pre-surgical clearance appointment. Patient explains she has made this appointment with an APRN in her PCP's practice for 4/21. Appreciated the information. Confirmed patient's upcoming appointment on 4/15 at 9 am with Dr. Meyers. Asked that she either complete the new patient paperwork in advance or arrive 30 minutes early to complete this in the office. Patient asked that the paperwork be send to her MyChart as she plans to complete this in advance.

## 2025-04-12 ENCOUNTER — TELEPHONE (OUTPATIENT)
Dept: FAMILY MEDICINE CLINIC | Facility: CLINIC | Age: 45
End: 2025-04-12

## 2025-04-12 NOTE — TELEPHONE ENCOUNTER
Received pre-op clearance request. Held in [facility] pre-op folder, appointment scheduled 4/21.

## 2025-04-15 ENCOUNTER — OFFICE VISIT (OUTPATIENT)
Facility: CLINIC | Age: 45
End: 2025-04-15
Payer: COMMERCIAL

## 2025-04-15 VITALS
SYSTOLIC BLOOD PRESSURE: 115 MMHG | RESPIRATION RATE: 16 BRPM | BODY MASS INDEX: 22.18 KG/M2 | HEART RATE: 71 BPM | OXYGEN SATURATION: 98 % | DIASTOLIC BLOOD PRESSURE: 81 MMHG | HEIGHT: 67.5 IN | WEIGHT: 143 LBS

## 2025-04-15 DIAGNOSIS — Z17.0 MALIGNANT NEOPLASM OF UPPER-OUTER QUADRANT OF RIGHT BREAST IN FEMALE, ESTROGEN RECEPTOR POSITIVE (HCC): Primary | ICD-10-CM

## 2025-04-15 DIAGNOSIS — C50.411 MALIGNANT NEOPLASM OF UPPER-OUTER QUADRANT OF RIGHT BREAST IN FEMALE, ESTROGEN RECEPTOR POSITIVE (HCC): Primary | ICD-10-CM

## 2025-04-15 PROCEDURE — 3079F DIAST BP 80-89 MM HG: CPT | Performed by: SURGERY

## 2025-04-15 PROCEDURE — 3008F BODY MASS INDEX DOCD: CPT | Performed by: SURGERY

## 2025-04-15 PROCEDURE — 99205 OFFICE O/P NEW HI 60 MIN: CPT | Performed by: SURGERY

## 2025-04-15 PROCEDURE — 3074F SYST BP LT 130 MM HG: CPT | Performed by: SURGERY

## 2025-04-15 NOTE — PATIENT INSTRUCTIONS
Surgeon:         Dr. Dejan Meyers                                        Tel:         [phone]                                  Fax:        [phone]    Surgery/Procedure: Immediate breast reconstruction with placement of bilateral breast tissue expanders and acellular dermal matrix, intraoperative Spy. 1.5 hours, general anesthesia, outpatient. Joint with Dr. Chapin.  Please request pre-op pec block per anesthesia.        Dx Code: C50.411, Z17.0    Hospital:  [facility]: [address] [place] [id number]           [phone]  5/12/2025    1. Someone will need to drive you to and from the hospital if your procedure is outpatient.    2.Do not drink alcohol or smoke 24 hours prior to your procedure.    3. Bring a picture ID and your insurance card.    4. You will be contacted by the hospital the day before to confirm the procedure time and location.     5. Do not take any herbal supplements or blood thinners at least one week before your procedure/surgery. This includes NSAID's (aspirin, baby aspirin, Motrin, Ibuprofen, Aleve, Advil, Naproxen, etc), Fish oil, vitamin E, turmeric, CoQ10, or green tea supplements, etc. *TYLENOL or acetaminophen is ok to take*    6. PRE-OPERATIVE TESTING: History and physical with medical clearance is REQUIRED within 30 days of the surgery date and is mandatory per Dr. Meyers. *If this is not done, your surgery will be postponed. To avoid delays with your clearance, please ensure your PCP appointment is at least 14 days prior to your surgical date.*  MEDICAL CLEARANCE WITH DR. Julian  CBC  CMP  EKG (within 90 days)    7. If you take Coumadin, Plavix, Xarelto, or Eliquis, please contact your prescribing physician for special instructions on how long to hold. If you take insulin, contact your primary care physician for special instructions.     8. Please inform us if you start or change any medications at least one week before surgery (ex: blood thinners, weight loss  medications, diabetic medications, herbal supplements, etc)    9. Does patient have diagnosis of sleep apnea?    [   ] Yes     [ X ]  No    Consent obtained  Photos taken on 4/15/2025

## 2025-04-15 NOTE — CONSULTS
New Patient Consultation    This is the first visit for this 44 year old female who presents to discuss reconstructive options following surgery for breast cancer.    History of Present Illness:   The patient is a 44 year old female who presents with a right breast cancer found by palpation.  The patient ultimately underwent biopsy which revealed invasive lobular carcinoma.  She does not have breast pain. She does not have family history of breast cancer. She does not have significant past history for breast diseases or breast surgery.  She wears a 36C bra.  The patient was seen by Dr. Chapin and has elected to undergo a bilateral nipple-sparing mastectomy.  She is here today to discuss post-mastectomy reconstructive options.  She is an active smoker.    Past Medical History:      Past Medical History[1]      Past Surgical History:  Past Surgical History[2]      Medications:    Current Medications[3]      Allergies:    Allergies[4]      Family History:   Family History[5]      Social History:  History   Alcohol Use    1.0 standard drink of alcohol/week    1 Glasses of wine per week     Comment: occasionally       History   Smoking Status    Every Day    Types: Cigarettes   Smokeless Tobacco    Never       History   Drug Use    Types: Cannabis           Review of Systems:    General:   The patient denies, fever, chills, night sweats, fatigue, generalized weakness, change in appetite, weight loss, or weight gain.    Endocrine:  There is no history of poor/slow wound healing, weight loss/gain, fertility or hormone problems, cold intolerance, heat intolerance, excessive thirst, or thyroid disease.    Allergic/Immunologic:  There is no history of hives, hay fever, angioedema or anaphylaxis.    HEENT:  The patient denies ear pain, ear drainage, hearing loss, change in vision, double vision, cataracts, glaucoma, nasal congestion, nosebleed, hoarseness, sore throat, or swollen glands.    Respiratory:  The patient denies  shortness of breath, cough, bloody cough, phlegm, asthma, or wheezing.    Cardiovascular:   The patient denies chest pain/pressure, palpitations, irregular heartbeat, high blood pressure, stroke, or leg swelling.    Breasts:  The patient denies breast masses, pain, change in the breast skin, skin dimpling, nipple discharge, or rash.    Gastrointestinal:  There is no history of difficulty or pain with swallowing, reflux symptoms, nausea, vomiting, dark/bloody stools, diarrhea, constipation, change in bowel habits, or abdominal pain.    Genitourinary:  The patient denies frequent urination, needing to get up at night to urinate, urinary hesitancy or retaining urine, painful urination, urinary incontinence, decreased urine stream, blood in urine, or vaginal/penile discharge.    Skin:  Then patient denies rash, itching, skin lesions, dry skin, change in skin color or change in moles, sunburn with blistering.    Hematologic/Lymphatic:  The patient denies easily bruising or bleeding, persistent swollen glands or lymph nodes, bleeding disorders, blood clots, or pulmonary embolism.    Gynecologic:  The patient denies irregular menses, pelvic pain, pain with intercourse, painful menses, or pregnancy.    Musculoskeletal:  The patient denies muscle aches/pain, joint pain, stiff joints, neck pain, back pain or bone pain.    Neurologic:  There is no history of migraines or severe headaches, seizure/epilepsy, speech problems, coordination problems, trembling/tremors, fainting/black outs, dizziness, memory problems, loss of sensation/numbness, problems walking, weakness, tingling or burning in hands/feet.     Psychiatric:  There is no history of abusive relationship, bipolar disorder, sleep disturbance, anxiety, depression or feeling of despair.       Physical Exam:  Vitals:    04/15/25 0858   BP: 115/81   Pulse: 71   Resp: 16     Body mass index is 22.07 kg/m².    The patient is awake, alert, and oriented.  She is well-nourished,  well-developed woman who appears her stated age. Her speech patterns and movements are normal. Her affect is appropriate.    HEENT: The head is normocephalic. The neck is supple. The thyroid is not enlarged and is without palpable masses.  The trachea is in the midline. Conjunctiva are clear, non-icteric.    Right breast: Grade 2 ptosis is noted.  Striae are absent.  Measurements: sternal notch to nipple 23cm, nipple to inframammary fold 8cm, base diameter 13cm.  The skin, nipple, and areola appear normal.  There is no nipple retraction or discharge.  There are no dominant masses in the breast.     Left breast:  Grade 2 ptosis is noted.  Striae are noted.  Measurements:  sternal notch to nipple 22cm, nipple to inframammary fold 9cm, base diameter 13cm.  The skin, nipple, and areola appear normal.  There is no nipple retraction or discharge.  There are no dominant masses in the breast.      Abdomen:  A small abdominal pannus is noted.  A transverse scar is noted at the superior aspect of the mons consistent with the patient's previous urological surgery.  No palpable hernia is detected.      Lymph Nodes:  There is no cervical, supraclavicular, or axillary lymphadenopathy appreciated.    Back: There is no vertebral column tenderness.    Skin: The skin appears normal. There are no suspicious appearing rashes or lesions.    Extremities: The extremities are without deformity, cyanosis or edema.    Impression:   The patient is a 44 year old female who presents with right breast cancer awaiting bilateral nipple-sparing mastectomy.     Discussion and Plan:  Prior to discussing reconstructive options, we discussed the impact of ongoing tobacco use on wound healing difficulties and mastectomy skin flap necrosis. We discussed possible inability to perform immediate reconstruction based on operative findings. The patient was strongly encouraged to stop smoking immediately and was also encouraged to contact her primary care  physician to discuss options for smoking cessation.    Next, we reviewed the options for post-mastectomy reconstruction and discussed the risks/benefits of timing (immediate versus delayed) and technique (implant-based versus autologous).  Given the patient's tobacco use, majority discussion focused on implant-based reconstruction.    Specifically, the nature and technique of tissue expander reconstruction was reviewed with the patient. We discussed the prepectoral placement of the tissue expander, use of acellular matrix, and placement of drains.  We discussed that use of acellular dermal matrix in  breast reconstruction is considered an \"off label\" use.  The expansion process, as well as the need for a secondary procedure for placement of a permanent implant, were reviewed.  Representative illustrations and photographs were demonstrated to the patient. The risks of surgery including but not limited to bleeding, infection, scarring, delayed wound healing, seroma, asymmetry, nipple areolar reconstruction, implant infection/extrusion requiring removal, injury to adjacent structures, capsular contracture, ALCL, breast implant associated illness, need for implant exchange, and need for further surgery were reviewed. The expected post-operative course was discussed including the need for activity limitation, drains, and suture removal.  The recommended FDA surveillance protocol for silicone implants was reviewed.  Finally, we reviewed the impact of post-mastectomy radiation therapy on implant-based reconstruction (should it be deemed necessary based on the final pathology results), including increased risk of reconstructive loss and capsular contracture.     Finally, we discussed the possible need for revisions as well as the process of nipple areolar reconstruction. Multiple questions were answered to the patient's satisfaction. No guarantees as to outcome were offered. The patient and  expressed understanding  and wished to proceed. A total of 60 minutes was spent reviewing the patient's history and diagnostic testing, examining the patient, reviewing reconstructive options, and coordinating the patient's care.           [1]   Past Medical History:   Anxiety    BRCA gene mutation negative in female    Negative for 48 gene panel+RNA aside from VUS in CHEK2. Results in media tab    Breast CA (HCC)    Chronic UTI    Chronic UTI, congenital anomaly, s/p urethral reconstruction    Contraception    IUD    Hyperthyroidism    Was on meds briefly, off meds    Kidney stones    Medullary sponge kidney    Pregnancy (HCC)    Pregnancy; Management:  4 hr labor ; Facility:  [facility]; Provider:  Hermila Núñez; Outcome/detail:  40 week 8 lb(S) 6oz Female; Comments:  Rachel (JLK)\"    Pregnancy (HCC)    Pregnancy; Management:   - ANNALEE Cuellar; Provider:  Young Huerta; Comments:  APGARS 9 (1 min), 9 (5 min) - 1st degree perineal laceration\"    Psoriasis    Mostly scalp    Vertigo    Visual impairment    contacts   [2]   Past Surgical History:  Procedure Laterality Date    Cyst aspiration left Left         Incise external hemorrhoid  2013    L posterior quad    Incise external hemorrhoid  10/29/2015    R posterior quad    Needle biopsy right Left 2025    2 site    Salpingectomy Bilateral     Skin surgery Left 10/2015    wart removal    Tubal ligation      Urology surgery procedure unlisted      Uretheral reconstruction   [3]   No outpatient medications have been marked as taking for the 4/15/25 encounter (Appointment) with Dejan Meyers MD.   [4]   Allergies  Allergen Reactions    Methimazole [Thiamazole] HIVES    Dayquil [Day Time] ANXIETY    Sulfamethoxazole RASH    Trimethoprim RASH   [5]   Family History  Problem Relation Age of Onset    Hypertension Father     Kidney Disease Father         ESRD on HD, s/p transplant    Thyroid Disorder Father     Thyroid Disorder Mother     Other (Kidney stones) Mother      Breast Cancer Self     Other (Inflammatory bowel disease) Other         No Family h/o Inflammatory bowel disease    Ovarian Cancer Neg     Prostate Cancer Neg     Pancreatic Cancer Neg

## 2025-04-16 ENCOUNTER — EKG ENCOUNTER (OUTPATIENT)
Dept: LAB | Facility: HOSPITAL | Age: 45
End: 2025-04-16
Payer: COMMERCIAL

## 2025-04-16 DIAGNOSIS — E55.9 VITAMIN D DEFICIENCY: ICD-10-CM

## 2025-04-16 DIAGNOSIS — C50.411 MALIGNANT NEOPLASM OF UPPER-OUTER QUADRANT OF RIGHT BREAST IN FEMALE, ESTROGEN RECEPTOR POSITIVE (HCC): ICD-10-CM

## 2025-04-16 DIAGNOSIS — C50.919 INVASIVE LOBULAR CARCINOMA OF BREAST IN FEMALE (HCC): Primary | ICD-10-CM

## 2025-04-16 DIAGNOSIS — Z17.0 MALIGNANT NEOPLASM OF UPPER-OUTER QUADRANT OF RIGHT BREAST IN FEMALE, ESTROGEN RECEPTOR POSITIVE (HCC): ICD-10-CM

## 2025-04-16 DIAGNOSIS — F41.9 ANXIETY: ICD-10-CM

## 2025-04-16 DIAGNOSIS — Z00.01 ENCOUNTER FOR GENERAL ADULT MEDICAL EXAMINATION WITH ABNORMAL FINDINGS: ICD-10-CM

## 2025-04-16 LAB
BASOPHILS # BLD AUTO: 0.05 X10(3) UL (ref 0–0.2)
BASOPHILS NFR BLD AUTO: 0.7 %
DEPRECATED RDW RBC AUTO: 40.6 FL (ref 35.1–46.3)
EOSINOPHIL # BLD AUTO: 0.2 X10(3) UL (ref 0–0.7)
EOSINOPHIL NFR BLD AUTO: 2.7 %
ERYTHROCYTE [DISTWIDTH] IN BLOOD BY AUTOMATED COUNT: 12.3 % (ref 11–15)
EST. AVERAGE GLUCOSE BLD GHB EST-MCNC: 100 MG/DL (ref 68–126)
HBA1C MFR BLD: 5.1 % (ref ?–5.7)
HCT VFR BLD AUTO: 39.8 % (ref 35–48)
HGB BLD-MCNC: 13 G/DL (ref 12–16)
IMM GRANULOCYTES # BLD AUTO: 0.02 X10(3) UL (ref 0–1)
IMM GRANULOCYTES NFR BLD: 0.3 %
LYMPHOCYTES # BLD AUTO: 2.59 X10(3) UL (ref 1–4)
LYMPHOCYTES NFR BLD AUTO: 35 %
MCH RBC QN AUTO: 29.3 PG (ref 26–34)
MCHC RBC AUTO-ENTMCNC: 32.7 G/DL (ref 31–37)
MCV RBC AUTO: 89.8 FL (ref 80–100)
MONOCYTES # BLD AUTO: 0.71 X10(3) UL (ref 0.1–1)
MONOCYTES NFR BLD AUTO: 9.6 %
NEUTROPHILS # BLD AUTO: 3.84 X10 (3) UL (ref 1.5–7.7)
NEUTROPHILS # BLD AUTO: 3.84 X10(3) UL (ref 1.5–7.7)
NEUTROPHILS NFR BLD AUTO: 51.7 %
PLATELET # BLD AUTO: 184 10(3)UL (ref 150–450)
RBC # BLD AUTO: 4.43 X10(6)UL (ref 3.8–5.3)
T3FREE SERPL-MCNC: 3.42 PG/ML (ref 2.4–4.2)
T4 FREE SERPL-MCNC: 1.1 NG/DL (ref 0.8–1.7)
TSI SER-ACNC: 0.52 UIU/ML (ref 0.55–4.78)
VIT D+METAB SERPL-MCNC: 23.4 NG/ML (ref 30–100)
WBC # BLD AUTO: 7.4 X10(3) UL (ref 4–11)

## 2025-04-16 PROCEDURE — 84443 ASSAY THYROID STIM HORMONE: CPT

## 2025-04-16 PROCEDURE — 84481 FREE ASSAY (FT-3): CPT

## 2025-04-16 PROCEDURE — 93010 ELECTROCARDIOGRAM REPORT: CPT | Performed by: INTERNAL MEDICINE

## 2025-04-16 PROCEDURE — 85025 COMPLETE CBC W/AUTO DIFF WBC: CPT

## 2025-04-16 PROCEDURE — 93005 ELECTROCARDIOGRAM TRACING: CPT

## 2025-04-16 PROCEDURE — 83036 HEMOGLOBIN GLYCOSYLATED A1C: CPT

## 2025-04-16 PROCEDURE — 82306 VITAMIN D 25 HYDROXY: CPT

## 2025-04-16 PROCEDURE — 36415 COLL VENOUS BLD VENIPUNCTURE: CPT

## 2025-04-16 PROCEDURE — 84439 ASSAY OF FREE THYROXINE: CPT

## 2025-04-17 LAB
ATRIAL RATE: 67 BPM
P AXIS: 67 DEGREES
P-R INTERVAL: 180 MS
Q-T INTERVAL: 382 MS
QRS DURATION: 76 MS
QTC CALCULATION (BEZET): 403 MS
R AXIS: 43 DEGREES
T AXIS: 50 DEGREES
VENTRICULAR RATE: 67 BPM

## 2025-04-18 ENCOUNTER — LAB ENCOUNTER (OUTPATIENT)
Dept: LAB | Age: 45
End: 2025-04-18
Payer: COMMERCIAL

## 2025-04-18 ENCOUNTER — HOSPITAL ENCOUNTER (OUTPATIENT)
Dept: GENERAL RADIOLOGY | Age: 45
Discharge: HOME | End: 2025-04-18
Payer: COMMERCIAL

## 2025-04-18 DIAGNOSIS — Z00.01 ENCOUNTER FOR GENERAL ADULT MEDICAL EXAMINATION WITH ABNORMAL FINDINGS: ICD-10-CM

## 2025-04-18 DIAGNOSIS — Z17.0 MALIGNANT NEOPLASM OF UPPER-OUTER QUADRANT OF RIGHT BREAST IN FEMALE, ESTROGEN RECEPTOR POSITIVE (HCC): ICD-10-CM

## 2025-04-18 DIAGNOSIS — C50.411 MALIGNANT NEOPLASM OF UPPER-OUTER QUADRANT OF RIGHT BREAST IN FEMALE, ESTROGEN RECEPTOR POSITIVE (HCC): ICD-10-CM

## 2025-04-18 LAB
ALBUMIN SERPL-MCNC: 4.5 G/DL (ref 3.2–4.8)
ALBUMIN/GLOB SERPL: 1.7 {RATIO} (ref 1–2)
ALP LIVER SERPL-CCNC: 53 U/L (ref 39–100)
ALT SERPL-CCNC: 16 U/L (ref 10–49)
ANION GAP SERPL CALC-SCNC: 8 MMOL/L (ref 0–18)
AST SERPL-CCNC: 17 U/L (ref ?–34)
BILIRUB SERPL-MCNC: 0.4 MG/DL (ref 0.3–1.2)
BUN BLD-MCNC: 10 MG/DL (ref 9–23)
BUN/CREAT SERPL: 14.3 (ref 10–20)
CALCIUM BLD-MCNC: 8.9 MG/DL (ref 8.7–10.4)
CHLORIDE SERPL-SCNC: 108 MMOL/L (ref 98–112)
CHOLEST SERPL-MCNC: 170 MG/DL (ref ?–200)
CO2 SERPL-SCNC: 25 MMOL/L (ref 21–32)
CREAT BLD-MCNC: 0.7 MG/DL (ref 0.55–1.02)
EGFRCR SERPLBLD CKD-EPI 2021: 108 ML/MIN/1.73M2 (ref 60–?)
FASTING PATIENT LIPID ANSWER: YES
FASTING STATUS PATIENT QL REPORTED: YES
GLOBULIN PLAS-MCNC: 2.7 G/DL (ref 2–3.5)
GLUCOSE BLD-MCNC: 91 MG/DL (ref 70–99)
HDLC SERPL-MCNC: 52 MG/DL (ref 40–59)
LDLC SERPL CALC-MCNC: 109 MG/DL (ref ?–100)
NONHDLC SERPL-MCNC: 118 MG/DL (ref ?–130)
OSMOLALITY SERPL CALC.SUM OF ELEC: 291 MOSM/KG (ref 275–295)
POTASSIUM SERPL-SCNC: 4.5 MMOL/L (ref 3.5–5.1)
PROT SERPL-MCNC: 7.2 G/DL (ref 5.7–8.2)
SODIUM SERPL-SCNC: 141 MMOL/L (ref 136–145)
TRIGL SERPL-MCNC: 43 MG/DL (ref 30–149)
VLDLC SERPL CALC-MCNC: 7 MG/DL (ref 0–30)

## 2025-04-18 PROCEDURE — 80061 LIPID PANEL: CPT

## 2025-04-18 PROCEDURE — 71046 X-RAY EXAM CHEST 2 VIEWS: CPT

## 2025-04-18 PROCEDURE — 80053 COMPREHEN METABOLIC PANEL: CPT

## 2025-04-18 PROCEDURE — 36415 COLL VENOUS BLD VENIPUNCTURE: CPT

## 2025-04-21 ENCOUNTER — OFFICE VISIT (OUTPATIENT)
Dept: FAMILY MEDICINE CLINIC | Facility: CLINIC | Age: 45
End: 2025-04-21

## 2025-04-21 VITALS
HEIGHT: 67 IN | HEART RATE: 66 BPM | BODY MASS INDEX: 23.07 KG/M2 | OXYGEN SATURATION: 99 % | WEIGHT: 147 LBS | SYSTOLIC BLOOD PRESSURE: 102 MMHG | DIASTOLIC BLOOD PRESSURE: 63 MMHG

## 2025-04-21 DIAGNOSIS — F41.9 ANXIETY: ICD-10-CM

## 2025-04-21 DIAGNOSIS — C50.411 MALIGNANT NEOPLASM OF UPPER-OUTER QUADRANT OF RIGHT BREAST IN FEMALE, ESTROGEN RECEPTOR POSITIVE (HCC): ICD-10-CM

## 2025-04-21 DIAGNOSIS — N94.89 PELVIC CONGESTION SYNDROME: ICD-10-CM

## 2025-04-21 DIAGNOSIS — E05.90 HYPERTHYROIDISM: ICD-10-CM

## 2025-04-21 DIAGNOSIS — F17.200 SMOKER: ICD-10-CM

## 2025-04-21 DIAGNOSIS — Z01.818 PREOP EXAMINATION: Primary | ICD-10-CM

## 2025-04-21 DIAGNOSIS — Z17.0 MALIGNANT NEOPLASM OF UPPER-OUTER QUADRANT OF RIGHT BREAST IN FEMALE, ESTROGEN RECEPTOR POSITIVE (HCC): ICD-10-CM

## 2025-04-21 PROCEDURE — 3008F BODY MASS INDEX DOCD: CPT

## 2025-04-21 PROCEDURE — 3074F SYST BP LT 130 MM HG: CPT

## 2025-04-21 PROCEDURE — 99214 OFFICE O/P EST MOD 30 MIN: CPT

## 2025-04-21 PROCEDURE — 3078F DIAST BP <80 MM HG: CPT

## 2025-04-21 RX ORDER — MULTIVITAMIN WITH IRON
50 TABLET ORAL DAILY
COMMUNITY

## 2025-04-21 NOTE — PROGRESS NOTES
Subjective:   Harper Ham is a 44 year old female who presents for Pre-Op Exam (Pt states she needs clearence for surgery on 5/12 she states she has testing done).       HPI   Harper Ham is a 44 year old female who presents for a pre-operative physical exam. Patient is to have Immediate breast reconstruction with placement of bilateral breast tissue expanders and acellular dermal matrix, to be done by Dr. Meyers with Dr. Chapin at [facility] on 05/12/2025. Pt has the following conditions:    ICD-10-CM    1. Malignant neoplasm of upper-outer quadrant of right breast in female, estrogen receptor positive (HCC)  C50.411     Z17.0       2. Pelvic congestion syndrome  N94.89       3. Hyperthyroidism  E05.90       4. Anxiety  F41.9       5. Smoker  F17.200           History/Other:      Chief Complaint Reviewed and Verified  Nursing Notes Reviewed and   Verified  Tobacco Reviewed  Allergies Reviewed  Medications Reviewed    Problem List Reviewed  Medical History Reviewed  Surgical History   Reviewed  OB Status Reviewed  Family History Reviewed  Social History   Reviewed           Tobacco:  Tobacco Use[1]  E-Cigarettes/Vaping       Questions Responses    E-Cigarette Use Never User    Counseling Given Yes           Tobacco cessation counseling for <3 minutes.        Current Medications[2]    Allergies[3]      Review of Systems   Constitutional: Negative.  Negative for activity change and fatigue.   HENT: Negative.  Negative for congestion, ear pain, rhinorrhea and sneezing.    Eyes: Negative.  Negative for redness.   Respiratory: Negative.  Negative for cough, shortness of breath and wheezing.    Cardiovascular: Negative.  Negative for chest pain.   Gastrointestinal: Negative.  Negative for abdominal pain, constipation, diarrhea, nausea and vomiting.   Endocrine: Negative.    Genitourinary: Negative.  Negative for difficulty urinating and frequency.   Musculoskeletal: Negative.  Negative for back pain,  joint swelling and myalgias.   Skin: Negative.  Negative for rash.   Allergic/Immunologic: Negative.    Neurological: Negative.  Negative for dizziness, syncope, light-headedness and headaches.   Hematological: Negative.    Psychiatric/Behavioral: Negative.           Objective:   /63 (BP Location: Right arm, Patient Position: Sitting, Cuff Size: adult)   Pulse 66   Ht 5' 7\" (1.702 m)   Wt 147 lb (66.7 kg)   LMP 03/28/2025 (Exact Date)   SpO2 99%   BMI 23.02 kg/m²  Estimated body mass index is 23.02 kg/m² as calculated from the following:    Height as of this encounter: 5' 7\" (1.702 m).    Weight as of this encounter: 147 lb (66.7 kg).      Physical Exam  Vitals and nursing note reviewed.   Constitutional:       Appearance: Normal appearance. She is normal weight.   HENT:      Head: Normocephalic and atraumatic.      Right Ear: Tympanic membrane normal.      Left Ear: Tympanic membrane normal.      Nose: Nose normal.      Mouth/Throat:      Mouth: Mucous membranes are moist.      Pharynx: Oropharynx is clear.   Eyes:      Extraocular Movements: Extraocular movements intact.      Conjunctiva/sclera: Conjunctivae normal.      Pupils: Pupils are equal, round, and reactive to light.   Cardiovascular:      Rate and Rhythm: Normal rate and regular rhythm.      Pulses: Normal pulses.      Heart sounds: Normal heart sounds.   Pulmonary:      Effort: Pulmonary effort is normal.      Breath sounds: Normal breath sounds.   Abdominal:      General: Abdomen is flat. Bowel sounds are normal.      Palpations: Abdomen is soft.   Musculoskeletal:         General: Normal range of motion.      Cervical back: Normal range of motion and neck supple.   Skin:     General: Skin is warm and dry.   Neurological:      General: No focal deficit present.      Mental Status: She is alert and oriented to person, place, and time. Mental status is at baseline.   Psychiatric:         Mood and Affect: Mood normal.         Behavior:  Behavior normal.         Thought Content: Thought content normal.         Judgment: Judgment normal.         Assessment & Plan:     Assessment & Plan  Preop examination  Patient has no significant history of cardiac or pulmoniary conditions Patient is a good surgical candidate, laboratory results done. Patient is medically cleared for surgery.         Malignant neoplasm of upper-outer quadrant of right breast in female, estrogen receptor positive (HCC)  -Patient scheduled to have surgery. Follow recommendation from oncologist        Pelvic congestion syndrome  Stable, continue current management         Hyperthyroidism  -Discussed with patient about how to monitoring BP at home and continue medication daily.  -Advised low salt diet.  -Eat less of canned , frozen, dried, packaged and fast food.  -Limit caffeine, alcohol. No smoking.  -Exercise regularly, at least 20 minutes 3 times a week.  -Maintain healthy body weight.   -Avoid stress.  -Lifestyle modification has potential for  improving BP control        Anxiety  -Advised patient to take medication as directed and follow up with psychologist/psychiatrist/counselor  -Education provided on daily exercise, and proper sleep  -Discussed about decreasing the amount of caffeine daily and cut out alcohol  -Patient can reach out to family, friends, [employer]ae[employer] groups for support as needed  -Education material was printed and given to patient           Smoker  Patient stopped smoking 10 days.               Medication use, effects and side effects discussed in detail with patient. The patient  indicated understanding of the diagnosis and agreed with the plan of care.    Return in about 4 weeks (around 5/19/2025).    CLEMENTINA Londono         [1]   Social History  Tobacco Use   Smoking Status Every Day    Current packs/day: 0.50    Average packs/day: 0.5 packs/day for 0.3 years (0.2 ttl pk-yrs)    Types: Cigarettes    Start date: 2025   Smokeless Tobacco Never   [2]    Current Outpatient Medications   Medication Sig Dispense Refill    vitamin B-12 50 MCG Oral Tab Take 1 tablet (50 mcg total) by mouth daily.      KRILL OIL OR Take by mouth.      Ascorbic Acid (VITAMIN C OR) Take by mouth.      Ergocalciferol (VITAMIN D OR) Take by mouth. (Patient not taking: Reported on 4/21/2025)     [3]   Allergies  Allergen Reactions    Bactrim [Sulfamethoxazole W/Trimethoprim] HIVES and SWELLING    Methimazole [Thiamazole] HIVES    Dayquil [Day Time] ANXIETY    Sulfamethoxazole RASH    Trimethoprim RASH

## 2025-04-21 NOTE — ASSESSMENT & PLAN NOTE
-Discussed with patient about how to monitoring BP at home and continue medication daily.  -Advised low salt diet.  -Eat less of canned , frozen, dried, packaged and fast food.  -Limit caffeine, alcohol. No smoking.  -Exercise regularly, at least 20 minutes 3 times a week.  -Maintain healthy body weight.   -Avoid stress.  -Lifestyle modification has potential for  improving BP control

## 2025-04-21 NOTE — ASSESSMENT & PLAN NOTE
-Advised patient to take medication as directed and follow up with psychologist/psychiatrist/counselor  -Education provided on daily exercise, and proper sleep  -Discussed about decreasing the amount of caffeine daily and cut out alcohol  -Patient can reach out to family, friends, Nond[employer] groups for support as needed  -Education material was printed and given to patient

## 2025-04-28 ENCOUNTER — TELEPHONE (OUTPATIENT)
Dept: FAMILY MEDICINE CLINIC | Facility: CLINIC | Age: 45
End: 2025-04-28

## 2025-04-28 ENCOUNTER — TELEPHONE (OUTPATIENT)
Dept: SURGERY | Facility: CLINIC | Age: 45
End: 2025-04-28

## 2025-04-28 NOTE — TELEPHONE ENCOUNTER
Called patient to schedule preoperative tissue expander class. Patient confirmed 4/30/25 at 11:30 am in [place]. Provided directions.

## 2025-04-30 ENCOUNTER — APPOINTMENT (OUTPATIENT)
Dept: ADMINISTRATIVE | Facility: HOSPITAL | Age: 45
End: 2025-04-30
Payer: COMMERCIAL

## 2025-04-30 ENCOUNTER — NURSE ONLY (OUTPATIENT)
Dept: SURGERY | Facility: CLINIC | Age: 45
End: 2025-04-30

## 2025-04-30 RX ORDER — HEPARIN SODIUM 5000 [USP'U]/ML
5000 INJECTION, SOLUTION INTRAVENOUS; SUBCUTANEOUS ONCE
Status: CANCELLED | OUTPATIENT
Start: 2025-04-30 | End: 2025-04-30

## 2025-04-30 NOTE — PROGRESS NOTES
Patient presented to the office for nurse visit for Pre-Op Tissue Expander education session prior to their upcoming surgery.  Tissue Expander presentation was shown. Ample time was spent with patient discussing the nature of the procedure as well was the expected tissue expansion process, timeframe, and second stage reconstruction options. The patient was also educated on activity restrictions, drain care, post op appts,  and post op medications. Educational illustrations and photographs were reviewed with the patient.   The reasons to call our office with post operative complications was discussed and included, but are not limited to, infection, swelling, drain complications, excessive bruising or bleeding.   Multiple questions were answered to the patient's satisfaction. She was encouraged to contact the office with any additional questions or concerns.    CATRACHITO Chinchilla

## 2025-05-06 ENCOUNTER — OFFICE VISIT (OUTPATIENT)
Age: 45
End: 2025-05-06
Attending: INTERNAL MEDICINE
Payer: COMMERCIAL

## 2025-05-06 DIAGNOSIS — C50.411 MALIGNANT NEOPLASM OF UPPER-OUTER QUADRANT OF RIGHT BREAST IN FEMALE, ESTROGEN RECEPTOR POSITIVE (HCC): Primary | ICD-10-CM

## 2025-05-06 DIAGNOSIS — Z17.0 MALIGNANT NEOPLASM OF UPPER-OUTER QUADRANT OF RIGHT BREAST IN FEMALE, ESTROGEN RECEPTOR POSITIVE (HCC): Primary | ICD-10-CM

## 2025-05-08 NOTE — PROGRESS NOTES
[facility].  She is accompanied by her  and her mom for support.     Patient is scheduled on 5/12/2025 for bilateral breast mastectomies, right lymphoscintigraphy, right sentinel lymph node biopsy and possible right axillary lymph node dissection with immediate breast reconstruction with tissue expander placement and acellular dermal matrix.     Reviewed with patient and spouse what to expect on the day of surgery.  Nuclear Medicine is schedule for 8:00 it is typical for Surgery to ask that patients arrive 90 minutes prior to this time.  However, patient will receive a call after 3pm on day prior to surgery as to a definitive time of arrival.  Reinforced to patient what to expect during lymphoscintigraphy, why this is performed and why this is performed.  Shared with patient she will also have a blue dye injected during her surgical case to further assist surgeon during the surgical case.  This will result in her urine being blue in color until cleared by her kidneys.     Shared with patient she will be visited by Dr. Chapin and Dr. Meyers as well as Anesthesiology staff prior to her going to the OR.  Encouraged patient to address any last minute questions or concerns at this time.  Patient is aware her surgical case is scheduled for 3 hours.  Following surgery she will proceed to recovery for closer observation prior to returning to Day Surgery for discharge should she meet criteria to do so.     Pink compression bra demonstrated to patient.  This included where she can adjust this for comfort as well as drain management system.  Demonstrated to patient where she can expect her ANABELA drain incision to be located.  Showed to patient ANABELA dressing of biopatch and tegaderm.  Provided patient with information for where additional pink compression bra may be purchased should she wish to do so.     Patient aware she will have a chest incision that will have a  surgical dressing of gauze and ABD.  Her incisions will be covered with steri strips.     Educated as to what to expect from ANABELA drain.  This included color and volume of drainage, and how volume should slowly decrease and color change over time.  Shared with patient to expect blood clots to also be present.  Shared with patient not to expect each drain to produce equal amounts of drainage.     Educated patient as to ANABELA drain care.  Care should be performed BID and prn dependent on volume of output.  Patient should gather needed equipment to include measurement cups, alcohol swabs, drain record, and witting utensil.  Instructed patient to perform hand hygiene prior to performing drain care.  Demonstrated to patient stripping of tubing, emptying of drain, measurement of drainage and documentation.  Discussed troubleshooting the drain as well as when to call MD office.  Provided patient with measurement cups, alcohol swabs, written ANABELA drain instructions, video link, as well as drain record.     Educated patient as to hygiene.  Patient may not get her surgical dressing or ANABELA drain sites wet.  Patient may shower from the waist down only.   Patient to sponge bathe from waist up. Provided patient with gauze and ABD dressings for home management should she need to apply extra padding for comfort.     Should patient or Care Partner observe moisture under the tegaderm dressing to her drain site this will need to be changed.  Demonstrated how to change ANABELA drain dressing should it be needed.  Provided patient with tegaderm and biopatch for home management.     Patient is aware she will have a pectoral nerve block during her surgical case to assist with post operative pain management.  Patient will also be prescribed Norco for pain management at home.  Discussed side effects of narcotic pain medication.  Encouraged hydration, ambulation, stool softer, and mild laxative for home management of constipation.  Encouraged patient to  keep a Pain Log, documenting her pain level, date and time as well as what was taken for pain relief.  Shared with patient she will be unable to drive until off narcotic pain medication for 24 hours and can react in an emergency.     Shared with patient she will be prescribed an oral antibiotic for infection prophylaxis.  Instructed patient to take as directed until all ANABELA drains have been removed.     Patient has met with Physical Therapy and is aware of activity restrictions and arm exercise program.     Follow up with Dr. Wise pending surgical pathology results.     Emotional support provided to patient.  Encouraged that she call with questions, concerns, or needs

## 2025-05-12 ENCOUNTER — ANESTHESIA EVENT (OUTPATIENT)
Dept: SURGERY | Facility: HOSPITAL | Age: 45
End: 2025-05-12
Payer: COMMERCIAL

## 2025-05-12 ENCOUNTER — ANESTHESIA (OUTPATIENT)
Dept: SURGERY | Facility: HOSPITAL | Age: 45
End: 2025-05-12
Payer: COMMERCIAL

## 2025-05-12 ENCOUNTER — HOSPITAL ENCOUNTER (OUTPATIENT)
Dept: NUCLEAR MEDICINE | Facility: HOSPITAL | Age: 45
Discharge: HOME | End: 2025-05-12
Attending: SURGERY
Payer: COMMERCIAL

## 2025-05-12 ENCOUNTER — HOSPITAL ENCOUNTER (OUTPATIENT)
Facility: HOSPITAL | Age: 45
Setting detail: HOSPITAL OUTPATIENT SURGERY
Discharge: HOME | End: 2025-05-12
Attending: SURGERY | Admitting: SURGERY
Payer: COMMERCIAL

## 2025-05-12 VITALS
TEMPERATURE: 97 F | RESPIRATION RATE: 16 BRPM | HEART RATE: 77 BPM | DIASTOLIC BLOOD PRESSURE: 77 MMHG | BODY MASS INDEX: 21.98 KG/M2 | SYSTOLIC BLOOD PRESSURE: 121 MMHG | WEIGHT: 145 LBS | OXYGEN SATURATION: 100 % | HEIGHT: 68 IN

## 2025-05-12 DIAGNOSIS — C50.411 MALIGNANT NEOPLASM OF UPPER-OUTER QUADRANT OF RIGHT BREAST IN FEMALE, ESTROGEN RECEPTOR POSITIVE (HCC): Primary | ICD-10-CM

## 2025-05-12 DIAGNOSIS — Z17.0 MALIGNANT NEOPLASM OF UPPER-OUTER QUADRANT OF RIGHT BREAST IN FEMALE, ESTROGEN RECEPTOR POSITIVE (HCC): Primary | ICD-10-CM

## 2025-05-12 DIAGNOSIS — C50.919 INVASIVE LOBULAR CARCINOMA OF BREAST IN FEMALE (HCC): ICD-10-CM

## 2025-05-12 LAB — B-HCG UR QL: NEGATIVE

## 2025-05-12 PROCEDURE — 88341 IMHCHEM/IMCYTCHM EA ADD ANTB: CPT | Performed by: SURGERY

## 2025-05-12 PROCEDURE — 81025 URINE PREGNANCY TEST: CPT

## 2025-05-12 PROCEDURE — 88331 PATH CONSLTJ SURG 1 BLK 1SPC: CPT | Performed by: SURGERY

## 2025-05-12 PROCEDURE — 88342 IMHCHEM/IMCYTCHM 1ST ANTB: CPT | Performed by: SURGERY

## 2025-05-12 PROCEDURE — 88332 PATH CONSLTJ SURG EA ADD BLK: CPT | Performed by: SURGERY

## 2025-05-12 PROCEDURE — 76942 ECHO GUIDE FOR BIOPSY: CPT | Performed by: STUDENT IN AN ORGANIZED HEALTH CARE EDUCATION/TRAINING PROGRAM

## 2025-05-12 PROCEDURE — 88307 TISSUE EXAM BY PATHOLOGIST: CPT | Performed by: SURGERY

## 2025-05-12 PROCEDURE — 78195 LYMPH SYSTEM IMAGING: CPT | Performed by: SURGERY

## 2025-05-12 RX ORDER — HYDROCODONE BITARTRATE AND ACETAMINOPHEN 5; 325 MG/1; MG/1
1-2 TABLET ORAL EVERY 4 HOURS PRN
Qty: 40 TABLET | Refills: 0 | Status: SHIPPED | OUTPATIENT
Start: 2025-05-12 | End: 2025-05-19

## 2025-05-12 RX ORDER — HYDROMORPHONE HYDROCHLORIDE 1 MG/ML
0.6 INJECTION, SOLUTION INTRAMUSCULAR; INTRAVENOUS; SUBCUTANEOUS EVERY 5 MIN PRN
Status: DISCONTINUED | OUTPATIENT
Start: 2025-05-12 | End: 2025-05-12

## 2025-05-12 RX ORDER — CEPHALEXIN 500 MG/1
500 CAPSULE ORAL 4 TIMES DAILY
Qty: 40 CAPSULE | Refills: 1 | Status: SHIPPED | OUTPATIENT
Start: 2025-05-12

## 2025-05-12 RX ORDER — LIDOCAINE AND PRILOCAINE 25; 25 MG/G; MG/G
CREAM TOPICAL ONCE
Status: COMPLETED | OUTPATIENT
Start: 2025-05-12 | End: 2025-05-12

## 2025-05-12 RX ORDER — DIAZEPAM 5 MG/1
2.5 TABLET ORAL ONCE
Status: COMPLETED | OUTPATIENT
Start: 2025-05-12 | End: 2025-05-12

## 2025-05-12 RX ORDER — LIDOCAINE HYDROCHLORIDE 10 MG/ML
INJECTION, SOLUTION EPIDURAL; INFILTRATION; INTRACAUDAL; PERINEURAL AS NEEDED
Status: DISCONTINUED | OUTPATIENT
Start: 2025-05-12 | End: 2025-05-12 | Stop reason: SURG

## 2025-05-12 RX ORDER — INDOCYANINE GREEN AND WATER 25 MG
KIT INJECTION AS NEEDED
Status: DISCONTINUED | OUTPATIENT
Start: 2025-05-12 | End: 2025-05-12 | Stop reason: SURG

## 2025-05-12 RX ORDER — ACETAMINOPHEN 10 MG/ML
INJECTION, SOLUTION INTRAVENOUS AS NEEDED
Status: DISCONTINUED | OUTPATIENT
Start: 2025-05-12 | End: 2025-05-12 | Stop reason: SURG

## 2025-05-12 RX ORDER — DIPHENHYDRAMINE HYDROCHLORIDE 50 MG/ML
12.5 INJECTION, SOLUTION INTRAMUSCULAR; INTRAVENOUS AS NEEDED
Status: DISCONTINUED | OUTPATIENT
Start: 2025-05-12 | End: 2025-05-12

## 2025-05-12 RX ORDER — MIDAZOLAM HYDROCHLORIDE 1 MG/ML
1 INJECTION INTRAMUSCULAR; INTRAVENOUS EVERY 5 MIN PRN
Status: DISCONTINUED | OUTPATIENT
Start: 2025-05-12 | End: 2025-05-12

## 2025-05-12 RX ORDER — SODIUM CHLORIDE, SODIUM LACTATE, POTASSIUM CHLORIDE, CALCIUM CHLORIDE 600; 310; 30; 20 MG/100ML; MG/100ML; MG/100ML; MG/100ML
INJECTION, SOLUTION INTRAVENOUS CONTINUOUS
Status: DISCONTINUED | OUTPATIENT
Start: 2025-05-12 | End: 2025-05-12

## 2025-05-12 RX ORDER — SCOPOLAMINE 1 MG/3D
1 PATCH, EXTENDED RELEASE TRANSDERMAL ONCE
Status: DISCONTINUED | OUTPATIENT
Start: 2025-05-12 | End: 2025-05-12 | Stop reason: HOSPADM

## 2025-05-12 RX ORDER — ONDANSETRON 4 MG/1
4 TABLET, FILM COATED ORAL EVERY 8 HOURS PRN
Qty: 12 TABLET | Refills: 0 | Status: SHIPPED | OUTPATIENT
Start: 2025-05-12

## 2025-05-12 RX ORDER — MIDAZOLAM HYDROCHLORIDE 1 MG/ML
INJECTION INTRAMUSCULAR; INTRAVENOUS AS NEEDED
Status: DISCONTINUED | OUTPATIENT
Start: 2025-05-12 | End: 2025-05-12 | Stop reason: SURG

## 2025-05-12 RX ORDER — HYDROMORPHONE HYDROCHLORIDE 1 MG/ML
0.2 INJECTION, SOLUTION INTRAMUSCULAR; INTRAVENOUS; SUBCUTANEOUS EVERY 5 MIN PRN
Status: DISCONTINUED | OUTPATIENT
Start: 2025-05-12 | End: 2025-05-12

## 2025-05-12 RX ORDER — DIAZEPAM 5 MG/1
TABLET ORAL
Status: COMPLETED
Start: 2025-05-12 | End: 2025-05-12

## 2025-05-12 RX ORDER — HYDROMORPHONE HYDROCHLORIDE 1 MG/ML
0.4 INJECTION, SOLUTION INTRAMUSCULAR; INTRAVENOUS; SUBCUTANEOUS EVERY 5 MIN PRN
Status: DISCONTINUED | OUTPATIENT
Start: 2025-05-12 | End: 2025-05-12

## 2025-05-12 RX ORDER — ONDANSETRON 2 MG/ML
4 INJECTION INTRAMUSCULAR; INTRAVENOUS EVERY 6 HOURS PRN
Status: DISCONTINUED | OUTPATIENT
Start: 2025-05-12 | End: 2025-05-12

## 2025-05-12 RX ORDER — PHENYLEPHRINE HCL 10 MG/ML
VIAL (ML) INJECTION AS NEEDED
Status: DISCONTINUED | OUTPATIENT
Start: 2025-05-12 | End: 2025-05-12 | Stop reason: SURG

## 2025-05-12 RX ORDER — HYDROMORPHONE HYDROCHLORIDE 1 MG/ML
INJECTION, SOLUTION INTRAMUSCULAR; INTRAVENOUS; SUBCUTANEOUS
Status: DISCONTINUED
Start: 2025-05-12 | End: 2025-05-12 | Stop reason: WASHOUT

## 2025-05-12 RX ORDER — DIAZEPAM 5 MG/1
5 TABLET ORAL EVERY 30 MIN PRN
Status: DISCONTINUED | OUTPATIENT
Start: 2025-05-12 | End: 2025-05-12 | Stop reason: HOSPADM

## 2025-05-12 RX ORDER — MEPERIDINE HYDROCHLORIDE 25 MG/ML
12.5 INJECTION INTRAMUSCULAR; INTRAVENOUS; SUBCUTANEOUS AS NEEDED
Status: DISCONTINUED | OUTPATIENT
Start: 2025-05-12 | End: 2025-05-12

## 2025-05-12 RX ORDER — CEPHALEXIN 500 MG/1
500 CAPSULE ORAL 4 TIMES DAILY
Qty: 40 CAPSULE | Refills: 1 | Status: SHIPPED | OUTPATIENT
Start: 2025-05-12 | End: 2025-05-21

## 2025-05-12 RX ORDER — ROCURONIUM BROMIDE 10 MG/ML
INJECTION, SOLUTION INTRAVENOUS AS NEEDED
Status: DISCONTINUED | OUTPATIENT
Start: 2025-05-12 | End: 2025-05-12 | Stop reason: SURG

## 2025-05-12 RX ORDER — HYDROMORPHONE HYDROCHLORIDE 1 MG/ML
INJECTION, SOLUTION INTRAMUSCULAR; INTRAVENOUS; SUBCUTANEOUS
Status: COMPLETED
Start: 2025-05-12 | End: 2025-05-12

## 2025-05-12 RX ORDER — SODIUM CHLORIDE 9 MG/ML
INJECTION, SOLUTION INTRAMUSCULAR; INTRAVENOUS; SUBCUTANEOUS AS NEEDED
Status: DISCONTINUED | OUTPATIENT
Start: 2025-05-12 | End: 2025-05-12 | Stop reason: HOSPADM

## 2025-05-12 RX ORDER — NALOXONE HYDROCHLORIDE 0.4 MG/ML
0.08 INJECTION, SOLUTION INTRAMUSCULAR; INTRAVENOUS; SUBCUTANEOUS AS NEEDED
Status: DISCONTINUED | OUTPATIENT
Start: 2025-05-12 | End: 2025-05-12

## 2025-05-12 RX ORDER — ONDANSETRON 2 MG/ML
INJECTION INTRAMUSCULAR; INTRAVENOUS AS NEEDED
Status: DISCONTINUED | OUTPATIENT
Start: 2025-05-12 | End: 2025-05-12 | Stop reason: SURG

## 2025-05-12 RX ORDER — DOCUSATE SODIUM 100 MG/1
100 CAPSULE, LIQUID FILLED ORAL 2 TIMES DAILY
Qty: 30 CAPSULE | Refills: 1 | Status: SHIPPED | OUTPATIENT
Start: 2025-05-12 | End: 2025-05-21

## 2025-05-12 RX ORDER — PROCHLORPERAZINE EDISYLATE 5 MG/ML
5 INJECTION INTRAMUSCULAR; INTRAVENOUS EVERY 8 HOURS PRN
Status: DISCONTINUED | OUTPATIENT
Start: 2025-05-12 | End: 2025-05-12

## 2025-05-12 RX ORDER — DOCUSATE SODIUM 100 MG/1
100 CAPSULE, LIQUID FILLED ORAL 2 TIMES DAILY
Qty: 30 CAPSULE | Refills: 1 | Status: SHIPPED | OUTPATIENT
Start: 2025-05-12

## 2025-05-12 RX ORDER — OXYCODONE HYDROCHLORIDE 5 MG/1
10 TABLET ORAL ONCE AS NEEDED
Status: COMPLETED | OUTPATIENT
Start: 2025-05-12 | End: 2025-05-12

## 2025-05-12 RX ORDER — ONDANSETRON 4 MG/1
4 TABLET, FILM COATED ORAL EVERY 8 HOURS PRN
Qty: 12 TABLET | Refills: 0 | Status: SHIPPED | OUTPATIENT
Start: 2025-05-12 | End: 2025-05-21 | Stop reason: ALTCHOICE

## 2025-05-12 RX ORDER — LIDOCAINE HYDROCHLORIDE 40 MG/ML
INJECTION, SOLUTION RETROBULBAR AS NEEDED
Status: DISCONTINUED | OUTPATIENT
Start: 2025-05-12 | End: 2025-05-12 | Stop reason: SURG

## 2025-05-12 RX ORDER — ACETAMINOPHEN 500 MG
1000 TABLET ORAL ONCE
Status: DISCONTINUED | OUTPATIENT
Start: 2025-05-12 | End: 2025-05-12 | Stop reason: HOSPADM

## 2025-05-12 RX ORDER — OXYCODONE HYDROCHLORIDE 5 MG/1
5 TABLET ORAL ONCE AS NEEDED
Status: COMPLETED | OUTPATIENT
Start: 2025-05-12 | End: 2025-05-12

## 2025-05-12 RX ADMIN — ROCURONIUM BROMIDE 10 MG: 10 INJECTION, SOLUTION INTRAVENOUS at 12:11:00

## 2025-05-12 RX ADMIN — LIDOCAINE HYDROCHLORIDE 4 ML: 40 INJECTION, SOLUTION RETROBULBAR at 10:42:00

## 2025-05-12 RX ADMIN — LIDOCAINE HYDROCHLORIDE 50 MG: 10 INJECTION, SOLUTION EPIDURAL; INFILTRATION; INTRACAUDAL; PERINEURAL at 10:41:00

## 2025-05-12 RX ADMIN — ONDANSETRON 4 MG: 2 INJECTION INTRAMUSCULAR; INTRAVENOUS at 12:52:00

## 2025-05-12 RX ADMIN — SODIUM CHLORIDE, SODIUM LACTATE, POTASSIUM CHLORIDE, CALCIUM CHLORIDE: 600; 310; 30; 20 INJECTION, SOLUTION INTRAVENOUS at 10:36:00

## 2025-05-12 RX ADMIN — INDOCYANINE GREEN AND WATER 7.5 MG: 25 MG KIT INJECTION at 12:35:00

## 2025-05-12 RX ADMIN — SODIUM CHLORIDE, SODIUM LACTATE, POTASSIUM CHLORIDE, CALCIUM CHLORIDE: 600; 310; 30; 20 INJECTION, SOLUTION INTRAVENOUS at 13:10:00

## 2025-05-12 RX ADMIN — ACETAMINOPHEN 1000 MG: 10 INJECTION, SOLUTION INTRAVENOUS at 12:58:00

## 2025-05-12 RX ADMIN — PHENYLEPHRINE HCL 100 MCG: 10 MG/ML VIAL (ML) INJECTION at 12:22:00

## 2025-05-12 RX ADMIN — ROCURONIUM BROMIDE 50 MG: 10 INJECTION, SOLUTION INTRAVENOUS at 10:42:00

## 2025-05-12 RX ADMIN — MIDAZOLAM HYDROCHLORIDE 2 MG: 1 INJECTION INTRAMUSCULAR; INTRAVENOUS at 10:39:00

## 2025-05-12 NOTE — DISCHARGE INSTRUCTIONS
Plastic Surgery Home Care Instructions      We hope you were pleased with your care at [facility].  We wish you the best outcome and overall experience with your operation.  These instructions will help to minimize pain, optimize healing, and improve the likelihood of a successful result.    What To Expect  There will be some spotting of the incision lines for the next few days.  Your breasts will be swollen and might feel congested for the next 1-2 weeks  Please DO NOT apply heat or ice to the affected area  Temporary areas of numbness are typical in the early weeks following a breast procedure.  Normalization of sensation can typically take up to several months following the operation.    Bandages (Dressing)  Keep dressings clean and dry  Do not remove your bra unless for hygiene purposes - compression is key - especially at night. You can change to a supportive sports bra or camilsole if this provides good compression and you are more comfortable in that rather than the surgical bra. No underwire.   You are to wear your bra 24/7 for 6 weeks post-operatively.   Reinforce the dressing with insertion of additional padding as needed - this is not required - for your comfort only  You can change the drain dressings if you need to (if they get wet). You will be given extra supplies from the hospital.     Daily Dressing Change:  Wash hands with soap and water  Remove bra, all gauze padding, and wound dressing  Apply a thin layer of neosporin (or other antibiotic ointment ex. Bacitracin)  Apply Xeroform gauze (yellow, medicated gauze) over incisions  Apply Curad (white non-stick gauze)  Reapply additional padding as desired for comfort  Reapply compressive bra  Repeat daily    Drain Care  Proper drain care is an important part of your home care and will help to prevent fluid collection, minimize the risk for infection, and increase the success of your breast reconstruction.  Empty your drains at 8 am and 8 pm each  day  Record each drain separately and use the drain sheet given to you to record the drainage. Follow the instructions on the drain sheet.  Wash your hands before and after emptying your drains  Bring drain sheet with you to your first office visit    Bathing/Showers  You may shower 48 hours after surgery. Before shower, take out all dressings from bra. Reinforce drain sites with additional waterproof dressings if needed. These will be given to you by the hospital. If some water gets underneath the dressing during the shower, just replace with a new dry waterproof dressing.   No baths, swimming, or hot tubs until you receive medical permission    Pain Medication: Norco  Take one or two tablets every six hours as needed for pain.  Do not take narcotics, if you do not have pain.  Keep stools soft.  Take a stool softener (Metamucil, Milk of Magnesia, Colace).  Eating fruit will also help to prevent constipation    Antibiotics: Keflex 4x/day until your drains come out  Antibiotics will help minimize the risk of a wound infection  Please note the refills on this prescription. If your drains are kept in after you finish the first course of the antibiotic, you need to continue refilling this until your drains are completely removed.   Fill the prescription as directed   Follow the instructions as written on the bottle's label  Call our office if you experience nausea, rash, or other symptoms which might be a possible side effect.    Over-The-Counter Medication  Non-prescription anti-inflammatory medications can also help to ease the pain.  You can take Aleve or ibuprofen starting 72 hours after surgery  Take as directed on the bottle  Drink a full glass of water with the medication    Home Medication  Resume your home medications as instructed  Do not resume herbal medications for two weeks    Diet  Resume your normal diet    Activity  No strenuous activity or heavy lifting (no lifting over 10 pounds)  No activities that  involve your pectoralis muscles (no planks, push-ups, etc)  You can go up and down the stairs as tolerated.   You cannot return to work, if your work requires strenuous activity.  You cannot return to physical exercise, sports, or gym workouts until you are allowed to participate in strenuous activity.    Driving  Do not drive, if you are taking pain medication.    Return to Work or School  You can return to work when you are not taking pain medication, if your work does not involve strenuous activity.  Contact the office, if you need a medical note.     Follow-up Appointment   Our office will call you to set up a time  Call the office for an appointment in two days if you cannot remember the appointment details.    Verify your appointment date, day, time, and location.  At your 1st postoperative office visit:  Your drains will be examined, wounds will be evaluated, healing assessed, and any additional concerns and instructions will be discussed.    Questions or Concerns  Call Dr. Meyers's office if you experience sudden swelling of the breast or purple/red/black discoloration of the breast.     [place]   Thank you for coming to [facility] for your operation.  The nurses and the anesthesiologist try very hard to make sure you receive the best care possible.  Your trust in them is greatly appreciated.    Thanks so much,  Dr. Luigi Silva, FNP-C  Lesvia Luz, FNP-BC     Drain Record Sheet    Date Time Drain #1 Drain #2                                                                                         Drain Record Sheet    Date Time Drain #3

## 2025-05-12 NOTE — ANESTHESIA PREPROCEDURE EVALUATION
PRE-OP EVALUATION    Patient Name: Harper Ham    Admit Diagnosis: Invasive lobular carcinoma of breast in female (HCC) [C50.919]    Pre-op Diagnosis: Invasive lobular carcinoma of breast in female (HCC) [C50.919]    Bilateral breast nipple versus skin sparing mastectomies, right lymphoscintigraphy, right sentinel lymph node biopsy, possible right axillary lymph node dissection (Gresik)Immediate breast reconstruction with placement of bilateral breast tissue expanders and acellular dermal matrix, intraoperative Spy. (Luigi)    Anesthesia Procedure: Bilateral breast nipple versus skin sparing mastectomies, right lymphoscintigraphy, right sentinel lymph node biopsy, possible right axillary lymph node dissection (Gresik)Immediate breast reconstruction with placement of bilateral breast tissue expanders and acellular dermal matrix, intraoperative Spy. (Luigi) (Bilateral)  . (Bilateral)    Surgeons and Role:  Panel 1:     * Mel Chapin MD - Primary  Panel 2:     * Dejan Meyers MD - Primary    Pre-op vitals reviewed.  Temp: 97.3 °F (36.3 °C)  Pulse: 68  Resp: 16  BP: 105/69  SpO2: 99 %  Body mass index is 22.05 kg/m².    Current medications reviewed.  Hospital Medications:  Current Medications[1]    Outpatient Medications:   Prescriptions Prior to Admission[2]    Allergies: Bactrim [sulfamethoxazole w/trimethoprim], Methimazole [thiamazole], Dayquil [day time], Sulfamethoxazole, and Trimethoprim      Anesthesia Evaluation        Anesthetic Complications           GI/Hepatic/Renal      (-) GERD       (-) chronic renal disease (sponge kidneys)   (-) liver disease                 Cardiovascular                (-) obesity  (-) hypertension     (-) CAD  (-) past MI  (-) CABG/stent  (-) pacemaker/AICD  (-) valvular problems/murmurs     (-) dysrhythmias   (-) CHF  (-) angina              Endo/Other      (-) diabetes                            Pulmonary  Comment: Quit smoking approximately 1 month ago. 5 per day at  the time    (-) asthma  (-) COPD                   Neuro/Psych          (-) CVA     (-) seizures                       Past Surgical History[3]  Social Hx on file[4]  History   Drug Use    Types: Cannabis     Comment: Smokes at hs for sleep     Available pre-op labs reviewed.  Lab Results   Component Value Date    WBC 7.4 04/16/2025    RBC 4.43 04/16/2025    HGB 13.0 04/16/2025    HCT 39.8 04/16/2025    MCV 89.8 04/16/2025    MCH 29.3 04/16/2025    MCHC 32.7 04/16/2025    RDW 12.3 04/16/2025    .0 04/16/2025     Lab Results   Component Value Date     04/18/2025    K 4.5 04/18/2025     04/18/2025    CO2 25.0 04/18/2025    BUN 10 04/18/2025    CREATSERUM 0.70 04/18/2025    GLU 91 04/18/2025    CA 8.9 04/18/2025            Airway      Mallampati: II  Mouth opening: 3 FB  TM distance: 4 - 6 cm  Neck ROM: full Cardiovascular    Cardiovascular exam normal.         Dental  Comment: Front teeth bonding           Pulmonary    Pulmonary exam normal.                 Other findings              ASA: 2   Plan: general  NPO status verified and     Post-procedure pain management plan discussed with surgeon and patient.  Surgeon requests: regional block    Plan/risks discussed with: patient                Present on Admission:  **None**             [1]    [Transfer Hold] acetaminophen (Tylenol Extra Strength) tab 1,000 mg  1,000 mg Oral Once    [Transfer Hold] scopolamine (Transderm-Scop) 1 MG/3DAYS patch 1 patch  1 patch Transdermal Once    lactated ringers infusion   Intravenous Continuous    ceFAZolin (Ancef) 2g in 10mL IV syringe premix  2 g Intravenous Once    [Transfer Hold] diazePAM (Valium) tab 5 mg  5 mg Oral Q30 Min PRN    [COMPLETED] lidocaine-prilocaine (Emla) 2.5-2.5 % cream   Topical Once    ceFAZolin (Ancef) 1 g, gentamicin (Garamycin) 80 mg in sodium chloride 0.9% 1,000 mL irrigation   Irrigation Once (Intra-Op)   [2]   Medications Prior to Admission   Medication Sig Dispense Refill Last  Dose/Taking    ZINC OR Take by mouth daily.   Past Week    vitamin B-12 50 MCG Oral Tab Take 1 tablet (50 mcg total) by mouth daily.   Past Week    KRILL OIL OR Take by mouth.   Past Week    Ascorbic Acid (VITAMIN C OR) Take 1,000 mg by mouth daily.   Past Week    Ergocalciferol (VITAMIN D OR) Take 2,000 Int'l Units by mouth daily.   Past Week   [3]   Past Surgical History:  Procedure Laterality Date    Cyst aspiration left Left     2021    Cyst removal Left 2018    Breast    Incise external hemorrhoid  09/19/2013    L posterior quad    Incise external hemorrhoid  10/29/2015    R posterior quad    Needle biopsy right Left 03/18/2025    2 site    Salpingectomy Bilateral 2021    Skin surgery Left 10/2015    wart removal    Tubal ligation  2021    Urology surgery procedure unlisted  1986    Uretheral reconstruction   [4]   Social History  Socioeconomic History    Marital status:     Number of children: 2   Occupational History    Occupation: teacher     Comment: elementary    Occupation:    Tobacco Use    Smoking status: Former     Current packs/day: 0.50     Average packs/day: 0.5 packs/day for 0.4 years (0.2 ttl pk-yrs)     Types: Cigarettes     Start date: 2025    Smokeless tobacco: Never    Tobacco comments:     4/30/25 : pt states quit approx 3 weeks ago   Vaping Use    Vaping status: Never Used   Substance and Sexual Activity    Alcohol use: Yes     Comment: occasionally    Drug use: Yes     Types: Cannabis     Comment: Smokes at hs for sleep   Other Topics Concern    Caffeine Concern Yes     Comment: (Coffee, Soda) 2 cups daily    Pt has a pacemaker No    Pt has a defibrillator No    Reaction to local anesthetic No

## 2025-05-12 NOTE — OPERATIVE REPORT
[facility]    PATIENT'S NAME: LEROY FLORES   ATTENDING PHYSICIAN: Mel Chapin M.D.   OPERATING PHYSICIAN: Mel Chapin M.D.   PATIENT ACCOUNT#:   [id number]    LOCATION:  [facility] 10  MEDICAL RECORD #:   [id number]       YOB: 1980  ADMISSION DATE:       05/12/2025      OPERATION DATE:  05/12/2025    OPERATIVE REPORT    PREOPERATIVE DIAGNOSIS:  Right breast cancer.  POSTOPERATIVE DIAGNOSIS:  Right breast cancer.  PROCEDURE:  Bilateral nipple-sparing mastectomy with right breast injection of blue dye for sentinel lymph node identification and right sentinel lymph node biopsy.  Her reconstruction will be dictated separately by Dr. Dejan Meyers.     ASSISTANT:  Puja Mazariegos CSA    ESTIMATED BLOOD LOSS:  For my portion of procedure 50 mL.    DRAINS:  Placed per Plastic Surgery.    COMPLICATIONS:  No immediate complications.    DISPOSITION:  Patient remains in OR for reconstruction with Dr. Meyers.    INDICATIONS:  The patient is a 44-year-old female who initially presented with a self-detected mass in the right breast.  She has biopsy-confirmed multicentric disease and a mastectomy was recommended.  Given her desire for immediate reconstruction as well as nipple preservation, the unusual procedure of nipple-sparing mastectomy instead of the usual procedure of simple mastectomy was necessary.  This required surgical assistance in order to allow for adequate exposure to complete this operation through a difficult incision.  This also required additional time, effort, and increased complexity to ensure for adequate skin perfusion of the nipple-areolar complex and skin flaps to accommodate her immediate reconstruction.  The approach to rober staging was described including the technique of sentinel node biopsy, possible need for axillary dissection, possible long-term sequelae of the procedure.  Risks and possible complications including, but not limited to,  infection, bleeding, injury to surrounding structures, possible need for reoperation were discussed with the patient.  She agreed to proceed.    OPERATIVE TECHNIQUE:  The patient was brought to the nuclear medicine department where she underwent injection of radioisotope as well as lymphoscintigraphy for sentinel lymph node identification preoperatively in the right breast.  She was brought to the OR, placed in supine position, properly padded and secured.  She was given a dose of IV antibiotics, and sequential compression devices were applied to legs for DVT prophylaxis.  General anesthesia was induced.  Bilateral pectoral nerve blocks were placed per Anesthesia.  A diluted methylene blue dye was injected in a periareolar location to the right breast, massaged approximately 5 minutes, and bilateral breasts and arms were prepped and draped in usual sterile fashion.  Attention was first taken toward the right axilla.  A transverse incision was made at the inferior aspect of the right axillary hairline after confirming uptake with a handheld gamma counter.  Using sharp dissection and electrocautery, I dissected through the clavipectoral fascia to deep axilla where I identified her sentinel lymph node which was excised and sent for frozen section, the results of which were negative for the presence of any metastatic disease and, therefore, no completion axillary lymph node dissection was performed.  This wound was irrigated, hemostasis assured with electrocautery and hemoclips, and closed with an interrupted 3-0 Vicryl for deep layer and a running 4-0 subcuticular Monocryl for skin.  Mastisol and Steri-Strips were applied, and 0.5% Marcaine was instilled in the cavity to assist with analgesia.  Attention was taken toward the right breast where an inframammary incision had been marked preoperatively by Dr. Meyers.  I incised this with a 15-blade knife for the skin, and through this, mastectomy flaps were raised to the  borders of the clavicle, sternum, inframammary fold, and latissimus tendon laterally.  The base of the right nipple was cored out as a separate specimen, marked with a clip at true margin and sent for frozen section, the results of which were negative for the presence of any atypical cells.  Therefore, we proceeded with a nipple-sparing approach and a short stitch was placed on the base of this nipple at the site of the biopsy on the mastectomy specimen.  The right breast was then dissected off the underlying muscle with electrocautery including en bloc excision of the pectoralis fascia.  It was further oriented with a long suture at the axillary tail and sent for routine permanent pathologic evaluation.  The wound was irrigated, hemostasis assured with electrocautery and hemoclips, and a moist laparotomy pad was left in place for reconstruction.  Next, I turned my attention toward the left breast where a symmetrical inframammary incision was incised with a 15-blade knife for the skin.  Through this, mastectomy flaps were raised to the borders of the clavicle, sternum, inframammary fold, and latissimus tendon laterally and the left breast was then dissected off the underlying muscle with electrocautery including en bloc excision of pectoralis fascia.  It was oriented with short stitch at the base of the nipple, long stitch at the axillary tail.  This wound was then irrigated, hemostasis assured with electrocautery and hemoclips, and a moist laparotomy pad was left in place for the remainder of surgery which will be dictated separately by Dr. Dejan Meyers.  All counts correct at the conclusion of my portion of procedure.  The patient was hemodynamically stable upon my exit from the OR.    Dictated By Mel Chapin M.D.  d: 05/12/2025 12:40:11  t: 05/12/2025 13:23:40  Job [id number]  [facility]/    cc: MD Dr. Samy Sebastian    [place] Node Biopsy for Breast Cancer -  Right  Operation performed with curative intent. Yes   Tracer(s) used to identify sentinel nodes in the upfront surgery (non-neoadjuvant) setting (select all that apply). Dye and Radioactive tracer   Tracer(s) used to identify sentinel nodes in the neoadjuvant setting (select all that apply). N/A   All nodes (colored or non-colored) present at the end of a dye-filled lymphatic channel were removed. Yes   All significantly radioactive nodes were removed. Yes   All palpably suspicious nodes were removed. Yes   Biopsy-proven positive nodes marked with clips prior to chemotherapy were identified and removed. N/A

## 2025-05-12 NOTE — ANESTHESIA PROCEDURE NOTES
Regional Block    Date/Time: 5/12/2025 10:45 AM    Performed by: Derek Shah CRNA  Authorized by: Pramod Hazel MD      General Information and Staff    Start Time:  5/12/2025 10:45 AM  End Time:  5/12/2025 10:50 AM  Anesthesiologist:  Pramod Hazel MD  CRNA:  Derek Shah CRNA  Performed by:  Anesthesiologist  Patient Location:  OR    Block Placement: Post Induction  Site Identification: real time ultrasound guided and image stored and retrievable    Block site/laterality marked before start: site marked  Reason for Block: at surgeon's request and post-op pain management    Preanesthetic Checklist: 2 patient identifers, IV checked, site marked, risks and benefits discussed, monitors and equipment checked, pre-op evaluation, timeout performed, anesthesia consent, sterile technique used, no prohibitive neurological deficits and no local skin infection at insertion site      Procedure Details    Patient Position:  Supine  Prep: ChloraPrep    Monitoring:  Cardiac monitor, continuous pulse ox, blood pressure cuff and heart rate  Anesthesia block type: Serratus Anterior.  Laterality:  Bilateral  Injection Technique:  Single-shot    Needle    Needle Type:  Short-bevel and echogenic  Needle Gauge:  21 G  Needle Length:  100 mm  Needle Localization:  Ultrasound guidance  Reason for Ultrasound Use: appropriate spread of the medication was noted in real time and no ultrasound evidence of intravascular and/or intraneural injection            Assessment    Injection Assessment:  Good spread noted, negative resistance, negative aspiration for heme, incremental injection and low pressure  Heart Rate Change: No    - Patient tolerated block procedure well without evidence of immediate block related complications.     Medications  5/12/2025 10:45 AM      Additional Comments    Medication:  Bupivacaine 0.25% 60ml: 30ml per side

## 2025-05-12 NOTE — BRIEF OP NOTE
Pre-Operative Diagnosis: Invasive lobular carcinoma of breast in female (HCC) [C50.919]     Post-Operative Diagnosis: Invasive lobular carcinoma of breast in female (HCC) [C50.919]      Procedure Performed:   Bilateral breast nipple sparing mastectomies, right lymphoscintigraphy, right sentinel lymph node biopsy    Surgeons and Role:  Panel 1:     * Mel Chapin MD - Primary    Assistant(s):  Surgical Assistant.: Puja Mazariegos CSA     Surgical Findings: Right sentinel lymph node x 1 negative on frozen section, base of right nipple negative on frozen section     Specimen: Base of right nipple, right sentinel lymph node x 1, bilateral breasts     Estimated Blood Loss: Blood Output: 50 mL (5/12/2025 12:19 PM)    Mel Chapin MD  5/12/2025  12:31 PM

## 2025-05-12 NOTE — PROGRESS NOTES
Plan for bilateral nipple sparing mastectomy by Dr. Chapin and immediate reconstruction with tissue expander and acellular dermal matrix reviewed with patient.  Given the patient's smoking history, reviewed the possible inability to perform immediate reconstruction.  We also reviewed the elevated risk of nipple and mastectomy skin flap necrosis.  The risks of surgery including but not limited to bleeding, infection, scarring, delayed wound healing, seroma, asymmetry, implant infection/extrusion requiring removal, expander deflation, injury to adjacent structures, capsular contracture, ALCL, and need for further surgery were reviewed. The expected post-operative course was discussed. Questions were answered to the patient's satisfaction. No guarantees as to outcome were offered. The patient expresses understanding and wishes to proceed.

## 2025-05-12 NOTE — BRIEF OP NOTE
DATE OF SURGERY:05/12/25   PREOPERATIVE DIAGNOSIS: Right breast cancer with acquired absence of bilateral breasts.  POSTOPERATIVE DIAGNOSIS: Right breast cancer with acquired absence of bilateral breasts.  PROCEDURE PERFORMED:  1.Intra-operative assessment of mastectomy skin flap perfusion with indocyanine green laser imaging  2.  Complex closure of bilateral breast wounds  ASSISTANT: Roxanna Jenkins SA.   ANESTHESIA: General with endotracheal intubation.  ESTIMATED BLOOD LOSS: 5 mL  DRAINS: Darian Piña x 3  SPECIMENS: None  COMPLICATIONS: None.

## 2025-05-12 NOTE — ANESTHESIA POSTPROCEDURE EVALUATION
[facility] Patient Status:  Hospital Outpatient Surgery   Age/Gender 44 year old female MRN [id number]   Location [place] POST ANESTHESIA CARE UNIT Attending Mel Chapin MD   Hosp Day # 0 PCP Bernardino Julian MD       Anesthesia Post-op Note    Bilateral breast nipple sparing mastectomies, right lymphoscintigraphy, right sentinel lymph node biopsy    Procedure Summary       Date: 05/12/25 Room / Location:  MAIN OR [address] [place] [address] MAIN OR    Anesthesia Start: 1033 Anesthesia Stop: 1316    Procedures:       Bilateral breast nipple sparing mastectomies, right lymphoscintigraphy, right sentinel lymph node biopsy (Bilateral: Breast)      Intraoperative Spy and closure of breast wounds (Luigi) (Bilateral: Breast) Diagnosis:       Invasive lobular carcinoma of breast in female (HCC)      (Invasive lobular carcinoma of breast in female (HCC) [C50.919])    Surgeons: Mel Chapin MD; Dejan Meyers MD Anesthesiologist: Pramod Hazel MD    Anesthesia Type: general ASA Status: 2            Anesthesia Type: general    Vitals Value Taken Time   /63 05/12/25 13:22   Temp 98.5 05/12/25 13:24   Pulse 65 05/12/25 13:24   Resp 15 05/12/25 13:24   SpO2 100 % 05/12/25 13:24   Vitals shown include unfiled device data.        Patient Location: PACU    Anesthesia Type: general    Airway Patency: patent    Postop Pain Control: adequate    Mental Status: mildly sedated but able to meaningfully participate in the post-anesthesia evaluation    Nausea/Vomiting: none    Cardiopulmonary/Hydration status: stable euvolemic    Complications: no apparent anesthesia related complications    Postop vital signs: stable    Dental Exam: Unchanged from Preop    Patient to be discharged from PACU when criteria met.

## 2025-05-12 NOTE — OPERATIVE REPORT
[facility]    PATIENT'S NAME: LEROY FLORES   ATTENDING PHYSICIAN: Mel Chapin M.D.   OPERATING PHYSICIAN: Dejan Meyers M.D.   PATIENT ACCOUNT#:   [id number]    [facility] 6 [facility] 10  MEDICAL RECORD #:   [id number]       YOB: 1980  ADMISSION DATE:       05/12/2025      OPERATION DATE:  05/12/2025    OPERATIVE REPORT    PREOPERATIVE DIAGNOSIS:  History of right breast cancer, status post bilateral mastectomy.  POSTOPERATIVE DIAGNOSIS:  History of right breast cancer, status post bilateral mastectomy.  PROCEDURE:    1.   Intraoperative assessment of mastectomy skin flap perfusion with indocyanine green laser imaging.  2.   Complex closure of bilateral breasts totaling 26 cm.     ASSISTANT:  YUNG Drake    ANESTHESIA:  General.    ESTIMATED BLOOD LOSS:  Minimal.    COMPLICATIONS:  None.    INDICATIONS:  The patient is a 44-year-old female with biopsy-proven right breast cancer.  The patient elected to undergo bilateral nipple-sparing mastectomy.  She wished to proceed with implant-based reconstruction.    OPERATIVE TECHNIQUE:  Informed consent was obtained from the patient.  The risks, benefits, and alternatives were reviewed with the patient preoperatively.  She expressed understanding and wished to proceed.  The patient was marked in preoperative holding area in the upright position.  The midline inframammary folds were marked.  An inframammary fold approach was then marked.  The patient was then taken to the operating room by Dr. Chapin where she underwent bilateral nipple-sparing mastectomy.  Once Dr. Chapin's portion of the procedure was completed, I entered the room and the plastic surgery portion of the procedure began.  The mastectomy skin flaps were inspected.  On the right side, significant hyperemia of the skin was noted and the flaps appeared somewhat thin.  Given the patient's recent smoking history, indocyanine green laser imaging was performed to  assess mastectomy skin flap perfusion.  This revealed significant hypoperfusion of the entire lower pole skin in the right breast greater than the left.  Given this finding, decision was made to abort immediate reconstruction.  The pockets were rinsed with antibiotic irrigation and hemostasis was secured with electrocautery.  Two 15-Turkish Kevin drains on the right and one 15-Turkish Kevin drain on the left were exited through lateral stab incisions and sutured to the skin with 3-0 nylon suture.  The wounds were then repaired in a layered fashion with 3-0 Vicryl deep dermal suture and 4-0 Monocryl subcuticular suture.  The length of the closure totaled 26 cm.  Bacitracin, Xeroform, gauze were applied to the incisions.  Biopatch and Tegaderm were applied to the drain sites.  An Ace wrap was applied.  The patient was awakened, extubated, taken to the recovery area in stable condition.  There were no operative complications.  All needle, sponge, and instrument counts were correct at the end of the procedure.    Dictated By Dejan Meyers M.D.  d: 05/12/2025 13:27:45  t: 05/12/2025 13:38:22  [facility] [id number]  [facility]/

## 2025-05-12 NOTE — ANESTHESIA PROCEDURE NOTES
Airway  Date/Time: 5/12/2025 10:43 AM  Reason: elective      General Information and Staff   Patient location during procedure: OR  Anesthesiologist: Pramod Hazel MD  Resident/CRNA: Derek Shah CRNA  Performed: CRNA   Performed by: Derek Shah CRNA  Authorized by: Pramod Hazel MD        Indications and Patient Condition  Indications for airway management: anesthesia  Sedation level: deep      Preoxygenated: yesPatient position: sniffing    Mask difficulty assessment: 1 - vent by mask    Final Airway Details    Final airway type: endotracheal airway    Successful airway: ETT  Cuffed: yes   Successful intubation technique: direct laryngoscopy  Endotracheal tube insertion site: oral  Blade: Jose Manuel  Blade size: #3  ETT size (mm): 7.0    Cormack-Lehane Classification: grade I - full view of glottis  Placement verified by: capnometry   Measured from: lips  ETT to lips (cm): 21  Number of attempts at approach: 1

## 2025-05-12 NOTE — H&P
History of Present Illness:   Ms. Harper Ham is a 44 year old woman who presents with a self detected mass the right breast.  She states she noticed this a few weeks ago.  She denies any nipple discharge, skin changes or axillary symptoms.  She does have a palpable concern on the left breast that she has a known left breast cyst in this region.  She has no known family history of breast cancer.  She has no personal prior history of breast disease or biopsies.  Given the new palpable concern of the right breast she had a bilateral diagnostic evaluation on March 18, 2025 that showed a suspicious 7 mm mass at 9:00 as well as a suspicious 1.6 cm mass at 930 and she was recommended for biopsy of the dominant 1 with presumed similar pathology to the second site pending review of that.  She was noted to have normal-appearing right axillary lymph nodes.  She also had a left breast ultrasound that showed a 1.6 cm benign simple cyst at 3:00.  She had her biopsy completed on March 20, 2025 and was confirmed to have an invasive lobular carcinoma at both biopsy site locations that was noted to be ER/DE positive, HER2/lisa negative by FISH with a Ki-67 of only 4%.  She has met with our genetic counselor the results of which are pending.  She had an MRI on March 26, 2025 that showed extensive enhancement including what appeared to be multicentric disease measuring 7.3 x 6.1 x 4.3 cm in the right breast.  She was noted to have no concerns in the left breast.  She was incidentally found to have a 6 mm right internal mammary chain lymph node.  Medical oncology has recommended a PET scan to look at the internal mammary lymph node which is pending. She is here today for evaluation and recommendations for further therapy.        Past Medical History       Past Medical History:    Anxiety    Breast CA (HCC)    Chronic UTI     Chronic UTI, congenital anomaly, s/p urethral reconstruction    Contraception     IUD    Hyperthyroidism      Was on meds briefly, off meds    Kidney stones    Medullary sponge kidney    Pregnancy (HCC)     Pregnancy; Management:  4 hr labor ; Facility:  [facility]; Provider:  Hermila Núñez; Outcome/detail:  40 week 8 lb(S) 6oz Female; Comments:  Rachel (JLK)\"    Pregnancy (HCC)     Pregnancy; Management:   - ANNALEE Cuellar; Provider:  Young Huerta; Comments:  APGARS 9 (1 min), 9 (5 min) - 1st degree perineal laceration\"    Psoriasis     Mostly scalp    Vertigo    Visual impairment     contacts            Past Surgical History         Past Surgical History:   Procedure Laterality Date    Cyst aspiration left Left           Incise external hemorrhoid   2013     L posterior quad    Incise external hemorrhoid   10/29/2015     R posterior quad    Needle biopsy right Left 2025     2 site    Skin surgery Left 10/2015     wart removal    Tubal ligation        Urology surgery procedure unlisted        Uretheral reconstruction            Gynecological History:  Pt is a   Pt was 26 years old at time of first pregnancy.    She has cumulative breastfeeding history of 20 months.  She achieved menarche at age 12 and LMP 3/28/25  She denies any history of hormone replacement therapy .  She has history of oral contraceptive use for 2 months , last in .  She denies infertility treatment to achieve pregnancy.     Medications:    Current Medications   No outpatient medications have been marked as taking for the 3/31/25 encounter (Appointment) with Mel Chapin MD.            Allergies:    [Allergies]    [Allergies]       Allergen Reactions    Methimazole [Thiamazole] HIVES    Dayquil [Day Time] ANXIETY    Sulfamethoxazole RASH    Trimethoprim RASH        Family History:   Family History         Family History   Problem Relation Age of Onset    Hypertension Father      Kidney Disease Father           ESRD on HD, s/p transplant    Thyroid Disorder Father      Thyroid Disorder Mother      Other (Kidney  stones) Mother      Breast Cancer Self      Other (Inflammatory bowel disease) Other           No Family h/o Inflammatory bowel disease    Ovarian Cancer Neg      Prostate Cancer Neg      Pancreatic Cancer Neg              She is not of Ashkenazi Orthodoxy ancestry.     Social History:       History   Alcohol Use    1.0 standard drink of alcohol/week    1 Glasses of wine per week       Comment: occasionally              History   Smoking Status    Every Day    Types: Cigarettes   Smokeless Tobacco    Never      Ms. Harper Ham is  with 2 children. She has 1 siblings. She is currently Employed full-time     Review of Systems:  General:   The patient denies, fever, chills, night sweats, fatigue, generalized weakness, change in appetite or weight loss.     HEENT:     The patient denies eye irritation, cataracts, redness, glaucoma, yellowing of the eyes, change in vision, color blindness, or wearing contacts/glasses. The patient denies hearing loss, ringing in the ears, ear drainage, earaches, nasal congestion, nose bleeds, +snoring, pain in mouth/throat, hoarseness, change in voice, facial trauma.     Respiratory:  The patient denies chronic cough, +phlegm, hemoptysis, pleurisy/chest pain, pneumonia, asthma, wheezing, difficulty in breathing with exertion, emphysema, chronic bronchitis, shortness of breath or abnormal sound when breathing.      Cardiovascular:  There is no history of chest pain, chest pressure/discomfort, palpitations, irregular heartbeat, fainting or near-fainting, difficulty breathing when lying flat, SOB/Coughing at night, swelling of the legs or chest pain while walking.     Breasts:  See history of present illness     Gastrointestinal:     There is no history of difficulty or pain with swallowing, reflux symptoms, vomiting, dark or bloody stools, constipation, yellowing of the skin, indigestion, nausea, change in bowel habits, diarrhea, abdominal pain or vomiting blood.       Genitourinary:  The patient denies frequent urination, needing to get up at night to urinate, urinary hesitancy or retaining urine, painful urination, urinary incontinence, decreased urine stream, blood in the urine or vaginal/penile discharge.     Skin:    The patient denies rash, +itching, skin lesions, dry skin, change in skin color or change in moles.      Hematologic/Lymphatic:  The patient denies +easily bruising or bleeding or +persistent swollen glands or lymph nodes.      Musculoskeletal:  The patient denies muscle aches/pain, joint pain, stiff joints, neck pain, back pain or bone pain.     Neuropsychiatric:  There is no history of migraines or severe headaches, seizure/epilepsy, speech problems, coordination problems, trembling/tremors, fainting/black outs, +dizziness, memory problems, loss of sensation/numbness, problems walking, weakness, tingling or burning in hands/feet. There is no history of abusive relationship, bipolar disorder, sleep disturbance, +anxiety, depression or feeling of despair.     Endocrine:    There is no history of poor/slow wound healing, weight loss/gain, fertility or hormone problems, cold intolerance, +thyroid disease.      Allergic/Immunologic:  There is no history of hives, hay fever, angioedema or anaphylaxis.     /74 (BP Location: Right arm, Patient Position: Sitting, Cuff Size: adult)   Pulse 72   Temp 98.3 °F (36.8 °C) (Temporal)   Resp 18   Ht 1.727 m (5' 8\")   Wt 65.5 kg (144 lb 6.4 oz)   LMP 03/01/2025 (Exact Date)   SpO2 98%   BMI 21.96 kg/m²      Physical Exam:  The patient is an alert, oriented, well-nourished and  well-developed woman who appears her stated age. Her speech patterns and movements are normal. Her affect is appropriate.     HEENT: The head is normocephalic. The neck is supple. The thyroid is not enlarged and is without palpable masses/nodules. There are no palpable masses. The trachea is in the midline. Conjunctiva are clear,  non-icteric.     Chest: The chest expands symmetrically. The lungs are clear to auscultation.     Heart: The rhythm is regular.  There are no murmurs, rubs, gallops or thrills.     Breasts:  Her breasts are symmetrical with a cup size C.  Right breast: The skin, nipple ,and areola appear normal. There is no skin dimpling with movement of the pectoralis. There is no nipple retraction. No nipple discharge can be elicited. The parenchyma is mildly nodular. There is ecchymosis in the right upper outer quadrant with an indiscrete palpable hematoma in this location. The axillary tail is normal.  Left breast:   The skin, nipple, and areola appear normal. There is no skin dimpling with movement of the pectoralis. There is no nipple retraction. No nipple discharge can be elicited. The parenchyma is mildly nodular. There are no dominant masses in the breast. The axillary tail is normal.     Abdomen:  The abdomen is soft, flat and non tender. The liver is not enlarged. There are no palpable masses.     Lymph Nodes:  The supraclavicular, axillary and cervical regions are free of significant lymphadenopathy.     Back: There is no vertebral column tenderness.     Skin: The skin appears normal. There are no suspicious appearing rashes or lesions.     Extremities: The extremities are without deformity, cyanosis or edema.     Impression:   Ms. Harper Ham is a 44 year old woman presents with right breast cancer, clinical stage T2 (M) NxMx.     Discussion and Plan:  I had a discussion with the Patient regarding her breast exam. On exam today I found her to be healing well since recent biopsy with no other clinical findings.  I personally reviewed the recent imaging and pathology and we discussed this at length.     The natural history and evolution of breast cancer were discussed with Ms. Harper Ham and her  family, including the difference between in-situ and invasive carcinoma, and the distinction  between local and systemic  disease and local and systemic therapy. For local treatment options,  I explained the risks and benefits of breast conservation and mastectomy (with or without  reconstruction), including the fact that survival rates are equal with these two approaches.  If breast conservation is elected, I explained the need for free margins, the possibility of re-  excision to achieve free margins, and the need for post-operative radiotherapy. The approach  to rober staging was also described, including the technique, risks and benefits of sentinel node  biopsy, the possible need for axillary dissection, and the long-term sequelae of this procedure.  With regard to systemic therapy, final recommendation will be made following receipt of final  pathology post-op, but I outlined the possibility of endocrine therapy, chemotherapy, and  herceptin, depending on tumor marker profile.  Following this discussion, where all of the patient's questions were answered, we agreed to  proceed with PET scan to look at her internal mammary lymph node.  Given the greater than 7 cm span of disease she is not a good candidate for breast conservation.  She is motivated for a bilateral mastectomy for maximal risk reduction independent of her pending genetic testing results.  She would like to meet with plastic surgery to discuss reconstructive options.  From my perspective she is a candidate for a bilateral nipple versus skin sparing mastectomy with right sentinel lymph node biopsy and possible right axillary lymph node dissection. The risks and possible complications of the procedure were explained to the patient and her family and she understood and agreed to the proposed plan. She was given ample opportunity for questions and those questions were answered to her satisfaction. She has been  encouraged to contact the office with any questions or concerns prior to her next appointment.      This note was created by Dragon voice recognition. Errors in  content may be related to improper recognition by the system; efforts to review and correct have been done but errors may still exist. Please be advised the primary purpose of this note is for me to communicate medical care. Standard sentence structure is not always used. Medical terminology and medical abbreviations may be used. There may be grammatical, typographical, and automated fill ins that may have errors missed in proofreading.    Pre-op Diagnosis: Invasive lobular carcinoma of breast in female (HCC) [C50.919]    The above referenced H&P was reviewed by Mel Chapin MD on 5/12/2025, the patient was examined and no significant changes have occurred in the patient's condition since the H&P was performed.  I discussed with the patient and/or legal representative the potential benefits, risks and side effects of this procedure; the likelihood of the patient achieving goals; and potential problems that might occur during recuperation.  I discussed reasonable alternatives to the procedure, including risks, benefits and side effects related to the alternatives and risks related to not receiving this procedure.  We will proceed with procedure as planned.

## 2025-05-14 ENCOUNTER — MED REC SCAN ONLY (OUTPATIENT)
Dept: FAMILY MEDICINE CLINIC | Facility: CLINIC | Age: 45
End: 2025-05-14

## 2025-05-16 ENCOUNTER — NURSE NAVIGATOR ENCOUNTER (OUTPATIENT)
Age: 45
End: 2025-05-16

## 2025-05-19 ENCOUNTER — OFFICE VISIT (OUTPATIENT)
Dept: SURGERY | Facility: CLINIC | Age: 45
End: 2025-05-19
Payer: COMMERCIAL

## 2025-05-19 DIAGNOSIS — Z90.13 ABSENCE OF BOTH BREASTS: Primary | ICD-10-CM

## 2025-05-19 PROCEDURE — 99024 POSTOP FOLLOW-UP VISIT: CPT | Performed by: PHYSICIAN ASSISTANT

## 2025-05-19 RX ORDER — HYDROCODONE BITARTRATE AND ACETAMINOPHEN 5; 325 MG/1; MG/1
1-2 TABLET ORAL EVERY 6 HOURS PRN
Qty: 20 TABLET | Refills: 0 | Status: SHIPPED | OUTPATIENT
Start: 2025-05-19

## 2025-05-19 RX ORDER — CEPHALEXIN 500 MG/1
500 CAPSULE ORAL 4 TIMES DAILY
Qty: 40 CAPSULE | Refills: 2 | Status: SHIPPED | OUTPATIENT
Start: 2025-05-19 | End: 2025-05-21

## 2025-05-19 NOTE — PROGRESS NOTES
Harper Ham is a 44 year old female who presents today for a follow-up after bilateral nipple sparing mastectomy with right breast sentinel lymph node biopsy (Dr. Chapin), complex closure of bilateral breasts, spy (Dr. Meyers) on 5/12/2025. She denies fever and chills. She denies nausea, vomiting, diarrhea or constipation. Her pain is controlled. She is taking Norco sparingly for pain relief.      Physical Exam     Breasts: Bilateral breast incisions clean, dry and intact without wound drainage or wound dehiscence.  There is minimal duskiness along the right IMF incision measuring 1 mm long by the width of the incision. Right NAC appears viable with some duskiness. Left NAC viable. Bilateral mastectomy skin is without erythema, ecchymosis, necrosis. No evidence of hematoma/seroma. Bilateral drain sites clean, dry and intact. Drain effluent serosanguinous.     There were no vitals filed for this visit.      Assessment and Plan     Harper Ham is doing well s/p bilateral nipple sparing mastectomy with right breast sentinel lymph node biopsy (Dr. Chapin), complex closure of bilateral breasts, spy (Dr. Meyers) on 5/12/2025.    One drain on each side was removed due to low output. Neosporin, gauze, tegederm was placed. She tolerated this well. New drain dressings were placed on remaining drains. We reviewed parameters for removal and she will contact us when her drains are ready for removal.     A dressing of neosporin and xeroform covering the incisions and NAC, telfa, ABD, ace and bra was placed bilaterally. She will change this daily.     She will continue with her antibiotic until her drains are removed, continue with compression at all times and continue with activity restrictions.     She will decrease Norco as able and transition to OTC medication.     A message was sent to our  to reach out to the patient to provide resources for emotional support. She will follow up when her drain is ready for  removal and in 1-2 weeks for wound check. She is scheduled to see Dr. Meyers on 6/27/2025.    Questions were answered. Patient understands.     The 21st Century Cures Act makes medical notes like these available to patients in the interest of transparency. Please be advised this is a medical document. Medical documents are intended to carry relevant information, facts as evident, and the clinical opinion of the practitioner. The medical note is intended as peer to peer communication and may appear blunt or direct. It is written in medical language and may contain abbreviations or verbiage that are unfamiliar.     ARPAN Wade  5/19/2025  3:07 PM

## 2025-05-21 ENCOUNTER — OFFICE VISIT (OUTPATIENT)
Facility: CLINIC | Age: 45
End: 2025-05-21
Payer: COMMERCIAL

## 2025-05-21 VITALS — SYSTOLIC BLOOD PRESSURE: 125 MMHG | DIASTOLIC BLOOD PRESSURE: 76 MMHG | OXYGEN SATURATION: 97 % | HEART RATE: 74 BPM

## 2025-05-21 DIAGNOSIS — C50.919 INVASIVE LOBULAR CARCINOMA OF BREAST IN FEMALE (HCC): Primary | ICD-10-CM

## 2025-05-21 DIAGNOSIS — C50.411 MALIGNANT NEOPLASM OF UPPER-OUTER QUADRANT OF RIGHT BREAST IN FEMALE, ESTROGEN RECEPTOR POSITIVE (HCC): ICD-10-CM

## 2025-05-21 DIAGNOSIS — Z17.0 MALIGNANT NEOPLASM OF UPPER-OUTER QUADRANT OF RIGHT BREAST IN FEMALE, ESTROGEN RECEPTOR POSITIVE (HCC): ICD-10-CM

## 2025-05-21 PROCEDURE — 3074F SYST BP LT 130 MM HG: CPT | Performed by: SURGERY

## 2025-05-21 PROCEDURE — 99024 POSTOP FOLLOW-UP VISIT: CPT | Performed by: SURGERY

## 2025-05-21 PROCEDURE — 3078F DIAST BP <80 MM HG: CPT | Performed by: SURGERY

## 2025-05-22 ENCOUNTER — TELEPHONE (OUTPATIENT)
Dept: SURGERY | Facility: CLINIC | Age: 45
End: 2025-05-22

## 2025-05-22 DIAGNOSIS — Z90.13 ABSENCE OF BOTH BREASTS: ICD-10-CM

## 2025-05-22 DIAGNOSIS — C50.919 INVASIVE LOBULAR CARCINOMA OF BREAST IN FEMALE (HCC): Primary | ICD-10-CM

## 2025-05-22 NOTE — TELEPHONE ENCOUNTER
Contacted patient to evaluate if remaining drain is within parameters for removal. Patient's drain output is low, but she cannot get a ride to the office today. Offered for her to come into the office tomorrow at 1 pm in [place], patient confirmed.

## 2025-05-23 ENCOUNTER — TELEPHONE (OUTPATIENT)
Dept: PHYSICAL THERAPY | Facility: HOSPITAL | Age: 45
End: 2025-05-23

## 2025-05-23 ENCOUNTER — OFFICE VISIT (OUTPATIENT)
Dept: SURGERY | Facility: CLINIC | Age: 45
End: 2025-05-23
Payer: COMMERCIAL

## 2025-05-23 DIAGNOSIS — Z90.13 ABSENCE OF BOTH BREASTS: Primary | ICD-10-CM

## 2025-05-23 PROCEDURE — 99024 POSTOP FOLLOW-UP VISIT: CPT

## 2025-05-23 NOTE — PROGRESS NOTES
Harper Ham is a 44 year old female who presents today for a follow-up after bilateral nipple sparing mastectomy with right breast sentinel lymph node biopsy (Dr. Chapin), complex closure of bilateral breasts, spy (Dr. Meyers) on 5/12/2025.     She denies fever and chills. She denies nausea, vomiting, diarrhea or constipation.   Her pain is controlled.      Physical Exam     Breasts: Bilateral breast incisions are clean, dry, intact.  No evidence of hematoma or seroma bilaterally.  Bilateral mastectomy skin is without erythema.  There is resolving duskiness along the central portion of her right IMF incision.  Bilateral nipples remain viable with slight duskiness to the right nipple.  Remaining right breast drain site is clean, dry, intact with serous fluid present.    There were no vitals filed for this visit.      Assessment and Plan     Harper Ham is doing well s/p bilateral nipple sparing mastectomy with right breast sentinel lymph node biopsy (Dr. Chapin), complex closure of bilateral breasts, spy (Dr. Meyers) on 5/12/2025.     Patient is here today for wound check and drain removal.  The remaining right breast drain was removed today due to low volume output.  The patient tolerated this well.  A dressing of Neosporin, 2 x 2, Tegaderm was placed.  The patient may now discontinue her oral antibiotics and may now shower.    The bilateral breast incisions were redressed with Neosporin, Xeroform, Telfa.  Additional ABD pads were placed in the patient's bra per her comfort.  Ace wrap and compression bra were reapplied.  Compression activity guidelines reviewed with the patient and her family.  The patient will follow-up with me on 6-2 for wound check.  The patient is then following up with Dr. Meyers on 6-27.    The patient has a consultation with oncology on 6-5.  The patient was encouraged to contact the office with any additional questions or concerns.    Questions were answered. Patient understands.      The 21st Century Cures Act makes medical notes like these available to patients in the interest of transparency. Please be advised this is a medical document. Medical documents are intended to carry relevant information, facts as evident, and the clinical opinion of the practitioner. The medical note is intended as peer to peer communication and may appear blunt or direct. It is written in medical language and may contain abbreviations or verbiage that are unfamiliar.     Tammy Silva, CLEMENTINA  5/23/2025  2:09 PM

## 2025-05-26 DIAGNOSIS — Z90.13 ABSENCE OF BOTH BREASTS: ICD-10-CM

## 2025-05-27 ENCOUNTER — TELEPHONE (OUTPATIENT)
Dept: SURGERY | Facility: CLINIC | Age: 45
End: 2025-05-27

## 2025-05-27 DIAGNOSIS — C50.919 INVASIVE LOBULAR CARCINOMA OF BREAST IN FEMALE (HCC): Primary | ICD-10-CM

## 2025-05-27 RX ORDER — HYDROCODONE BITARTRATE AND ACETAMINOPHEN 5; 325 MG/1; MG/1
1-2 TABLET ORAL EVERY 6 HOURS PRN
Qty: 20 TABLET | Refills: 0 | OUTPATIENT
Start: 2025-05-27

## 2025-05-27 RX ORDER — HYDROCODONE BITARTRATE AND ACETAMINOPHEN 5; 325 MG/1; MG/1
1 TABLET ORAL EVERY 4 HOURS PRN
Qty: 20 TABLET | Refills: 0 | Status: SHIPPED | OUTPATIENT
Start: 2025-05-27

## 2025-05-27 NOTE — TELEPHONE ENCOUNTER
Called patient to discuss pain control.  Refill for [place] sent due to patient's pain to her right axilla and arm.  Patient unable to tolerate ibuprofen due to her kidneys.  Patient is scheduled to meet with physical therapy tomorrow.  Patient verbalized understanding and appreciative of refill.

## 2025-05-28 ENCOUNTER — OFFICE VISIT (OUTPATIENT)
Dept: PHYSICAL THERAPY | Facility: HOSPITAL | Age: 45
End: 2025-05-28
Payer: COMMERCIAL

## 2025-05-28 DIAGNOSIS — C50.919 INVASIVE LOBULAR CARCINOMA OF BREAST IN FEMALE (HCC): Primary | ICD-10-CM

## 2025-05-28 DIAGNOSIS — Z90.13 ABSENCE OF BOTH BREASTS: ICD-10-CM

## 2025-05-28 PROCEDURE — 97140 MANUAL THERAPY 1/> REGIONS: CPT | Performed by: PHYSICAL THERAPIST

## 2025-05-28 PROCEDURE — 97530 THERAPEUTIC ACTIVITIES: CPT | Performed by: PHYSICAL THERAPIST

## 2025-05-28 PROCEDURE — 97162 PT EVAL MOD COMPLEX 30 MIN: CPT | Performed by: PHYSICAL THERAPIST

## 2025-05-28 PROCEDURE — 97110 THERAPEUTIC EXERCISES: CPT | Performed by: PHYSICAL THERAPIST

## 2025-05-28 NOTE — PROGRESS NOTES
LYMPHEDEMA EVALUATION     Diagnosis:   Invasive lobular carcinoma of breast in female (HCC) (C50.919)  Absence of both breasts (Z90.13) Patient:  Harper Ham (44 year old, female)        Referring Provider: Suad Johnson  Today's Date   5/28/2025    Precautions:  Cancer   Date of Evaluation: 05/28/25  Date of Surgery: 5/12/2025     PATIENT SUMMARY   Summary of chief complaints: pain and limited ROM  History of current condition: R breast cancer, 5/12/2025 B mastectomy w/ R SLNB (0/1)   Pain level: current 5 /10 (across chest, R axilla, and R arm), at best 5 /10, at worst 6 /10  Description of symptoms:     Occupation:    Leisure activities/Hobbies: enjoys gardening, walks dog daily,   Prior level of function: Independent w/ ADLs  Current limitations: Difficulty using UEs for ADLs, unable to reach overhead, unable to lift, unable to drive, difficulty sleeping  Pt goals: Regain feeling in my right side and not experie pain  Hand Dominance:    Do you live with someone who would be able to assist you:    Self-Reported Weight: 145 lb  Are you following the recommended diet from your physician: Yes    Past medical history was reviewed with Harper.  Significant findings include:    Harper  has a past medical history of Anxiety, BRCA gene mutation negative in female (04/02/2025), Breast CA ([facility]) (03/20/2025), Calculus of kidney, Chronic UTI, Contraception (2011), Disorder of thyroid, Hyperthyroidism, Kidney stones, Medullary sponge kidney, PONV (postoperative nausea and vomiting), Pregnancy ([facility]) (2007), Pregnancy ([facility]) (2010), Psoriasis, Vertigo, and Visual impairment.  She  has a past surgical history that includes skin surgery (Left, 10/2015); urology surgery procedure unlisted (1986); incise external hemorrhoid (09/19/2013); incise external hemorrhoid (10/29/2015); tubal ligation (2021); cyst aspiration left (Left); needle biopsy right (Left, 03/18/2025); salpingectomy (Bilateral, 2021); and cyst removal  (Left, 2018).    ASSESSMENT  Harper presents to physical therapy evaluation with primary c/o pain and limited ROM. The results of the objective tests and measures show decreased ROM and strength B shld, poor posture, swelling R axilla and upper arm.. Functional deficits include but are not limited to Difficulty using UEs for ADLs, unable to reach overhead, unable to lift, unable to drive, difficulty sleeping. Signs and symptoms are consistent with a rehabilitation diagnosis of impaired functional mobility after breast cancer related surgery. Pt and PT discussed evaluation findings, pathology, and POC.  Pt voiced understanding of plan of care and agrees to participate in self-care including HEP and progression towards self-management. Skilled Physical Therapy is medically necessary to address the above impairments and reach functional goals.        OBJECTIVE:   Musculoskeletal  Posture: guarded posturing  Special Tests:       ROM and Strength:  (* denotes performed with pain)  Shoulder   ROM MMT (-/5)    R L R L     Flex 75 95 Decreased Decreased     Ext 25 40 Decreased Decreased     Abd (C5) 70 85 Decreased Decreased     IR L4 L1 Decreased Decreased     ER 70 (at 70 degrees abd) 90 Decreased Decreased     Low Trap n/a Decreased Decreased     Mid Trap n/a Decreased Decreased     SA n/a Decreased Decreased       Flexibility:    UE Flexibility R L     Upper Trap         Levator Scap         Pec Major min restricted min restricted     Scalenes         Latissimus min restricted min restricted     Bicep           Swelling       5/28/2025   Self Care   Are you following the recommended diet from your physician? Yes         5/28/2025   Edema/Tissue Observations   RUE Locations  Right Axilla;Right Upper Arm       Deferred arm volume measurements due to pt having limited ROM and significant pain   Edema/Tissue Observations: Rt Axilla swelling   Edema/Tissue Observations: Right upper arm swelling   LUE Locations  --         Deferred arm volume measurements due to pt having limited ROM and significant pain   Trunk Locations --        Steri-strips w/ xerform gauze over intact chest incisions, no bleeding or drainage noted. Pt wearing compression ACE wrap around chest w/ post-op bra. No noted swelling at this time. Decreased scar and chest tissue mobility.            Today's Treatment and Response:   Pt education was provided on exam findings, treatment diagnosis, treatment plan, expectations, and prognosis.  Today's Treatment       5/28/2025   PT Treatment   Insurance Auth # and Certification Dates Certification From: 5/28/2025  To:8/26/2025   # of Visits Used/Approved 1/10   Therapeutic Exercise Supine AAROM B shld    Sitting:  - codmans x 5 B  - cane flex and abd x 5 each  - scap retr x 10  - shld horiz abd/add x 5  - shld flex x 5  - shld abd x 5   Manual Therapy STM B pect, abds, axilla    MLD B axilla, chest, and trunk (avoided over chest incisions)   Therapeutic Activity Explained Lymphedema; reviewed lymphatic system, informed patient of lymphedema precautions and risk reduction practices, and importance of skin care.     Reviewed eval findings  Educated in HEP       Therapeutic Exercise Min 15   Manual Therapy Min 25   Therapeutic Activity Min 15   Evaluation Min 30   Total of Timed Procedures 55   Total of Service Based 30   Total Treatment Time 85         Charges:  PT EVAL: Moderate Complexity, mm2, act1, ex1  In agreement with evaluation findings and clinical rationale, this evaluation involved MODERATE COMPLEXITY decision making due to 1-2 personal factors/comorbidities, 3 or more body structures involved/activity limitations, and evolving symptoms as documented in the evaluation.                                                                   PLAN OF CARE:    Goals: (to be met in 10 visits)        LYMPHEDEMA GOALS Not Met Progressing Toward Partially Met Met   Improve chest tissue/scar mobility to WFL to allow patient to  reach overhead without pain       Pt to be independent with self MLD, ROM exercises, and donning/doffing compression bandages/garments to facilitate swelling reduction and to be independent at self-management once discharged.       Increase B shoulder AROM to flex 165, abd 170, ER 90, IR to T9 to improve ease of dressing/bathing/and reaching overhead.       Increase B UE strength to at least 4/5 to improve ease of lifting and performing household chores.       Arm volume measurements to be completed to have baseline measurements for early lymphedema identification                      Frequency / Duration: Patient will be seen 1-2x/week or a total of 10 visits over a 90 day period. Treatment will include: MLD; Skin care; Compression; Remedial exercise; Manual therapy; Self-care home management; Neuromuscular re-education; Therapeutic activities; Therapeutic exercises; Home exercise program instructions    Education or treatment limitation: None   Rehab Potential: good     LLIS SCORES    Q1-Q17 Score #of Q's Answered Raw Score Percent Impairment      50    17    2.94    2.94       Patient/Family/Caregiver was advised of these findings, precautions, and treatment options and has agreed to actively participate in planning and for this course of care.    Thank you for your referral. Please co-sign or sign and return this letter via fax as soon as possible to [phone]. If you have any questions, please contact me at Dept: [phone]    Sincerely,  Electronically signed by therapist: Micaela Sharp PT  Physician's certification required: Yes  I certify the need for these services furnished under this plan of treatment and while under my care.    X___________________________________________________ Date____________________    Certification From: 5/28/2025  To:8/26/2025

## 2025-05-28 NOTE — PATIENT INSTRUCTIONS
STAND OR SIT and RELAX SHOULDER   Lean slightly forward and allow affected arm to hang   Start with gentle circles clockwise x 10 then counter clockwise x 10.   Do as needed for pain relief and relaxation      1 WEEK AFTER SURGERY:   FORWARD FLEXION and ABDUCTION WITH ASSISTANCE   ASSISTED FLEXION  STAND or SIT holding a stick or cane   Raise your arms slowly as high as you can until you feel a stretch or a pull   DO NOT PUSH INTO PAIN   Repeat 10 time, 3 times per day   *ONLY RAISE ARMS TO SHOULDER HEIGHT WHEN   DRAINS ARE IN    Assisted Abduction  STAND or SIT holding a stick or cane   With the assist of the stick, push you affected arm away from your body to the side   DO NOT PUSH INTO PAIN   Repeat 10 times, 3 times per day   *ONLY RAISE ARMS TO SHOULDER HEIGHT WHEN   DRAINS ARE IN       SHOULDER BLADE SQUEEZE.  HORIZONTAL ABDUCTION    SCAP SQUEEZE  1.   2. 3.  Stand or Sit   Squeeze your shoulder blades together with   your arms at your sides   Repeat 10 times, 3 times per day      HORIZ ABD  1. 2.   3.   4.  Stand or Sit   Start with your palms together and arms out in front   Slowly open your arms until you feel a stretching or pulling.  NO PAIN Repeat 10 time, 3 times per day                            FORWARD FLEXION AND ABDUCTION WITHOUT ASSISTANCE:    FLEXION     Start with affected arm at your side   Try lifting straight forward until you feel a pulling or stretching NO PAIN SHOULD BE FELT   Repeat 10 times, 3 times per day   *ONLY RAISE ARMS TO SHOULDER HEIGHT   WHEN DRAINS ARE IN    ABDUCTION        Start with affected arm at your side   Try lifting your arm out to the side until you feel a pulling or stretching NO PAIN SHOUD BE FELT   Repeat 10 times, 3 times per day   *ONLY RAISE ARMS TO SHOULDER HEIGHT   WHEN DRAINS ARE IN    Reference:   Julianna Ragsdale. Breast Cancer Treatment Handbook: The Comprehensive Patient Navigation Guide. 8th   Edition. [employer] 2014. ISBN-13:834-5-85809-09-5                  -HEP2go for illustrations                  Progression of your range of motion:   Your range of motion should gradually improve every day   If your motion does not improve, or starts to worsen, please contact your surgeon for assessment.

## 2025-05-29 NOTE — PROGRESS NOTES
Breast Surgery Post-Operative Visit    Diagnosis: Right breast cancer status post bilateral nipple sparing resection with right sentinel lymph node biopsy and reconstruction on May 12, 2025.    Stage: T2 (M) N0MX    Disease Status:  Surgical treatment complete, Oncotype Dx and medical oncology recommendations pending.    History:   This 44 year old woman presented with a self detected mass the right breast.  She states she noticed this a few weeks ago.  She denies any nipple discharge, skin changes or axillary symptoms.  She does have a palpable concern on the left breast that she has a known left breast cyst in this region.  She has no known family history of breast cancer.  She has no personal prior history of breast disease or biopsies.  Given the new palpable concern of the right breast she had a bilateral diagnostic evaluation on March 18, 2025 that showed a suspicious 7 mm mass at 9:00 as well as a suspicious 1.6 cm mass at 930 and she was recommended for biopsy of the dominant 1 with presumed similar pathology to the second site pending review of that.  She was noted to have normal-appearing right axillary lymph nodes.  She also had a left breast ultrasound that showed a 1.6 cm benign simple cyst at 3:00.  She had her biopsy completed on March 20, 2025 and was confirmed to have an invasive lobular carcinoma at both biopsy site locations that was noted to be ER/MT positive, HER2/lisa negative by FISH with a Ki-67 of only 4%.  She has met with our genetic counselor the results of which are pending.  She had an MRI on March 26, 2025 that showed extensive enhancement including what appeared to be multicentric disease measuring 7.3 x 6.1 x 4.3 cm in the right breast.  She was noted to have no concerns in the left breast.  She was incidentally found to have a 6 mm right internal mammary chain lymph node.  Medical oncology has recommended a PET scan to look at the internal mammary lymph node which was deemed  unremarkable.  She underwent a bilateral mastectomy with reconstruction.  She has productive surgical drain.  Overall she has been healing well without complication. She is here today for evaluation and recommendations for further therapy.        Past Medical History:    Anxiety    BRCA gene mutation negative in female    Negative for 48 gene panel+RNA aside from VUS in CHEK2. Results in media tab    Breast CA (HCC)    right breast cancer    Calculus of kidney    Chronic UTI    Chronic UTI, congenital anomaly, s/p urethral reconstruction    Contraception    IUD    Disorder of thyroid    not on any medications    Hyperthyroidism    Was on meds briefly, off meds    Kidney stones    Medullary sponge kidney    PONV (postoperative nausea and vomiting)    nausea    Pregnancy (HCC)    Pregnancy; Management:  4 hr labor ; Facility:  [facility]; Provider:  Hermila Núñez; Outcome/detail:  40 week 8 lb(S) 6oz Female; Comments:  Rachel (TERE)\"    Pregnancy (HCC)    Pregnancy; Management:   - ANNALEE Cuellar; Provider:  Young Huerta; Comments:  APGARS 9 (1 min), 9 (5 min) - 1st degree perineal laceration\"    Psoriasis    Mostly scalp    Vertigo    Visual impairment    contacts, glasses       Past Surgical History:   Procedure Laterality Date    Cyst aspiration left Left         Cyst removal Left 2018    Breast    Incise external hemorrhoid  2013    L posterior quad    Incise external hemorrhoid  10/29/2015    R posterior quad    Needle biopsy right Left 2025    2 site    Salpingectomy Bilateral     Skin surgery Left 10/2015    wart removal    Tubal ligation      Urology surgery procedure unlisted      Uretheral reconstruction       Gynecological History:  Pt is a   Pt was 26 years old at time of first pregnancy.    She has cumulative breastfeeding history of 20 months.  She achieved menarche at age 12 and LMP 3/28/25  She denies any history of hormone replacement therapy .  She has history of  oral contraceptive use for 2 months , last in 1999.  She denies infertility treatment to achieve pregnancy.    Medications:     HYDROcodone-acetaminophen 5-325 MG Oral Tab Take 1-2 tablets by mouth every 6 (six) hours as needed for Pain. 20 tablet 0    cephALEXin 500 MG Oral Cap Take 1 capsule (500 mg total) by mouth 4 (four) times daily. 40 capsule 1    ondansetron (ZOFRAN) 4 mg tablet Take 1 tablet (4 mg total) by mouth every 8 (eight) hours as needed for Nausea. 12 tablet 0    docusate sodium 100 MG Oral Cap Take 1 capsule (100 mg total) by mouth 2 (two) times daily. 30 capsule 1    ZINC OR Take by mouth daily.      vitamin B-12 50 MCG Oral Tab Take 1 tablet (50 mcg total) by mouth daily.      KRILL OIL OR Take by mouth.      Ascorbic Acid (VITAMIN C OR) Take 1,000 mg by mouth daily.      Ergocalciferol (VITAMIN D OR) Take 2,000 Int'l Units by mouth daily.         Allergies:    Allergies[1]    Family History:   Family History   Problem Relation Age of Onset    Thyroid Disorder Mother     Other (Kidney stones) Mother     Arthritis Mother     Hypertension Father     Kidney Disease Father         ESRD on HD, s/p transplant    Thyroid Disorder Father     Breast Cancer Self     Other (Inflammatory bowel disease) Other         No Family h/o Inflammatory bowel disease    Ovarian Cancer Neg     Prostate Cancer Neg     Pancreatic Cancer Neg        She is not of Ashkenazi Yarsanism ancestry.    Social History:  History   Alcohol Use    Yes     Comment: occasionally       History   Smoking Status    Former    Types: Cigarettes   Smokeless Tobacco    Never     Ms. Harper Ham is  with 2 children. She has 1 siblings. She is currently Employed full-time    Review of Systems:  General:   The patient denies, fever, chills, night sweats, fatigue, generalized weakness, change in appetite or weight loss.    HEENT:     The patient denies eye irritation, cataracts, redness, glaucoma, yellowing of the eyes, change in vision,  color blindness, or wearing contacts/glasses. The patient denies hearing loss, ringing in the ears, ear drainage, earaches, nasal congestion, nose bleeds, +snoring, pain in mouth/throat, hoarseness, change in voice, facial trauma.    Respiratory:  The patient denies chronic cough, +phlegm, hemoptysis, pleurisy/chest pain, pneumonia, asthma, wheezing, difficulty in breathing with exertion, emphysema, chronic bronchitis, shortness of breath or abnormal sound when breathing.     Cardiovascular:  There is no history of chest pain, chest pressure/discomfort, palpitations, irregular heartbeat, fainting or near-fainting, difficulty breathing when lying flat, SOB/Coughing at night, swelling of the legs or chest pain while walking.    Breasts:  See history of present illness    Gastrointestinal:     There is no history of difficulty or pain with swallowing, reflux symptoms, vomiting, dark or bloody stools, constipation, yellowing of the skin, indigestion, nausea, change in bowel habits, diarrhea, abdominal pain or vomiting blood.     Genitourinary:  The patient denies frequent urination, needing to get up at night to urinate, urinary hesitancy or retaining urine, painful urination, urinary incontinence, decreased urine stream, blood in the urine or vaginal/penile discharge.    Skin:    The patient denies rash, +itching, skin lesions, dry skin, change in skin color or change in moles.     Hematologic/Lymphatic:  The patient denies +easily bruising or bleeding or +persistent swollen glands or lymph nodes.     Musculoskeletal:  The patient denies muscle aches/pain, joint pain, stiff joints, neck pain, back pain or bone pain.    Neuropsychiatric:  There is no history of migraines or severe headaches, seizure/epilepsy, speech problems, coordination problems, trembling/tremors, fainting/black outs, +dizziness, memory problems, loss of sensation/numbness, problems walking, weakness, tingling or burning in hands/feet. There is no  history of abusive relationship, bipolar disorder, sleep disturbance, +anxiety, depression or feeling of despair.    Endocrine:    There is no history of poor/slow wound healing, weight loss/gain, fertility or hormone problems, cold intolerance, +thyroid disease.     Allergic/Immunologic:  There is no history of hives, hay fever, angioedema or anaphylaxis.    /76 (BP Location: Left arm, Patient Position: Sitting, Cuff Size: adult)   Pulse 74   LMP 04/18/2025 (Exact Date)   SpO2 97%     Physical Exam:  The patient is an alert, oriented, well-nourished and  well-developed woman who appears her stated age. Her speech patterns and movements are normal. Her affect is appropriate.    HEENT: The head is normocephalic. The neck is supple. The thyroid is not enlarged and is without palpable masses/nodules. There are no palpable masses. The trachea is in the midline. Conjunctiva are clear, non-icteric.    Chest: The chest expands symmetrically. The lungs are clear to auscultation.    Heart: The rhythm is regular.  There are no murmurs, rubs, gallops or thrills.    Breasts:  Her breasts are surgically absent.  Skin flaps are well-perfused with no evidence of necrosis.  Right drain is serosanguineous and productive.    Abdomen:  The abdomen is soft, flat and non tender. The liver is not enlarged. There are no palpable masses.    Lymph Nodes:  The supraclavicular, axillary and cervical regions are free of significant lymphadenopathy.    Back: There is no vertebral column tenderness.    Skin: The skin appears normal. There are no suspicious appearing rashes or lesions.    Extremities: The extremities are without deformity, cyanosis or edema.    Impression:   Ms. Harper Ham is a 44 year old woman presents with right breast cancer, status post bilateral nipple sparing mastectomy with right sentinel lymph node biopsy and reconstruction.    Discussion and Plan:  I had a discussion with the Patient regarding her breast  exam. On exam today I found her to be healing well since her surgery with no evidence of clinical concern.  I reviewed the pathology that showed 2 foci of invasive carcinoma the largest of which measured 2.1 cm with negative margins and a negative right axillary lymph node and benign findings in the left breast for which no further surgery is needed.  Her last surgical drain was removed today without complication.  She will continue to follow with plastic surgery for management of her wound and reconstructive needs.  Oncotype DX has been ordered per medical oncology to determine the adjuvant systemic treatment recommendations.  Given small burden of disease in the setting of mastectomy she was not recommended for any postmastectomy radiation therapy.  She will need no further mammograms status post bilateral mastectomies and should see me in 6 months for her next exam.  I encouraged her to continue monitoring her ROM and strength and explained that a referral to physical therapy may be warranted in the future if she identifies any limitations or restrictions. She was encouraged to contact the office with any questions or concerns prior to her next scheduled appointment.        [1]   Allergies  Allergen Reactions    Bactrim [Sulfamethoxazole W/Trimethoprim] HIVES and SWELLING    Methimazole [Thiamazole] HIVES    Dayquil [Day Time] ANXIETY    Sulfamethoxazole RASH    Trimethoprim RASH

## 2025-05-30 ENCOUNTER — OFFICE VISIT (OUTPATIENT)
Dept: PHYSICAL THERAPY | Facility: HOSPITAL | Age: 45
End: 2025-05-30
Payer: COMMERCIAL

## 2025-05-30 PROCEDURE — 97140 MANUAL THERAPY 1/> REGIONS: CPT | Performed by: PHYSICAL THERAPIST

## 2025-05-30 PROCEDURE — 97110 THERAPEUTIC EXERCISES: CPT | Performed by: PHYSICAL THERAPIST

## 2025-05-30 NOTE — PROGRESS NOTES
Patient: Harper Ham (44 year old, female) Referring Provider:  Insurance:   Diagnosis: Invasive lobular carcinoma of breast in female (HCC) (C50.919)  Absence of both breasts (Z90.13) Suad Johnson  [employer] [place]   Date of Surgery: 5/12/2025  N/A   Precautions:  Cancer  Referral Information:    Date of Evaluation: Req: 0, Auth: 0, Exp:     05/28/25 POC Auth Visits:          Today's Date   5/30/2025    Subjective  \"I'm doing better\"       Pain: 4/10 (across chest, R shld, R flank)     Objective  Decreased ROM B shld, cording noted R abdomen     Assessment  ROM improving    Goals (to be met in 10 visits)      LYMPHEDEMA GOALS Not Met Progressing Toward Partially Met Met   Improve chest tissue/scar mobility to WFL to allow patient to reach overhead without pain       Pt to be independent with self MLD, ROM exercises, and donning/doffing compression bandages/garments to facilitate swelling reduction and to be independent at self-management once discharged.       Increase B shoulder AROM to flex 165, abd 170, ER 90, IR to T9 to improve ease of dressing/bathing/and reaching overhead.       Increase B UE strength to at least 4/5 to improve ease of lifting and performing household chores.       Arm volume measurements to be completed to have baseline measurements for early lymphedema identification                          Plan  Cont as per plan. Progress ROM exercises as tolerate    Treatment Last 4 Visits  Treatment Day:         5/28/2025 5/30/2025   PT Treatment   Insurance Auth # and Certification Dates Certification From: 5/28/2025  To:8/26/2025 Certification From: 5/28/2025  To:8/26/2025   # of Visits Used/Approved 1/10 2/10   Therapeutic Exercise Supine AAROM B shld    Sitting:  - codmans x 5 B  - cane flex and abd x 5 each  - scap retr x 10  - shld horiz abd/add x 5  - shld flex x 5  - shld abd x 5 Diaphragmatic breathing    Reviewed AROM/HEP    Supine AAROM/manual stretching B shld   Manual Therapy STM B pect,  abds, axilla    MLD B axilla, chest, and trunk (avoided over chest incisions) STM B pect, axilla, trunk, abd  STM R abd/area of cording    MLD B axilla, chest, trunk     Compression:  - fabricated foam compression pad for R trunk/axilla area to wear under post-op bra and ACE wrap    Therapeutic Activity Explained Lymphedema; reviewed lymphatic system, informed patient of lymphedema precautions and risk reduction practices, and importance of skin care.     Reviewed eval findings  Educated in HEP        Therapeutic Exercise Min 15 10   Manual Therapy Min 25 35   Therapeutic Activity Min 15    Evaluation Min 30    Total of Timed Procedures 55 45   Total of Service Based 30 0   Total Treatment Time 85 45        HEP       Charges     mm2, ex1

## 2025-06-02 ENCOUNTER — OFFICE VISIT (OUTPATIENT)
Dept: PHYSICAL THERAPY | Facility: HOSPITAL | Age: 45
End: 2025-06-02
Payer: COMMERCIAL

## 2025-06-02 ENCOUNTER — OFFICE VISIT (OUTPATIENT)
Facility: CLINIC | Age: 45
End: 2025-06-02
Payer: COMMERCIAL

## 2025-06-02 DIAGNOSIS — Z90.13 ABSENCE OF BOTH BREASTS: Primary | ICD-10-CM

## 2025-06-02 PROCEDURE — 99024 POSTOP FOLLOW-UP VISIT: CPT | Performed by: PHYSICIAN ASSISTANT

## 2025-06-02 PROCEDURE — 97140 MANUAL THERAPY 1/> REGIONS: CPT

## 2025-06-02 NOTE — PROGRESS NOTES
Patient: Harper Ham (44 year old, female) Referring Provider:  Insurance:   Diagnosis: Invasive lobular carcinoma of breast in female (HCC) (C50.919)  Absence of both breasts (Z90.13) Suad Johnson  [employer] [place]   Date of Surgery: 5/12/2025  N/A   Precautions:  Cancer  Referral Information:    Date of Evaluation: Req: 0, Auth: 0, Exp:     05/28/25 POC Auth Visits:          Today's Date   6/2/2025    Subjective  Pt states that she is trying to do the exercises daily.  She didn't feel comfortable wearing the foam pad for R trunk.  \"It was too cumbersomb.\"       Pain: 4/10     Objective  MEASUREMENTS TAKEN: BUE AROM   Shoulder       5/28/2025 6/2/2025   Shoulder ROM/MMT   Rt Flexion Shoulder 75 130   Lt Flexion Shoulder 95 135   Rt Flexion Shoulder MMT Decreased    Lt Flexion Shoulder MMT Decreased    Rt Extension Shoulder 25 40   Lt Extension Shoulder 40 45   Rt Extension Shoulder MMT Decreased    Rt Extension Shoulder MMT Decreased    Rt Abduction Shoulder 70 110   Lt Abduction Shoulder 85 115   Rt Abduction Shoulder MMT (C5) Decreased    Lt Abduction Shoulder MMT (C5) Decreased    Rt IR Shoulder L4 T9   Lt IR Shoulder L1 T7   Rt IR Shoulder MMT Decreased    Lt IR Shoulder MMT Decreased    Rt ER Shoulder 70       at 70 degrees abd 80   Lt ER Shoulder 90 90   Rt ER Shoulder MMT Decreased    Lt ER Shoulder MMT Decreased    Rt Low Trap MMT Decreased    Lt Low Trap MMT Decreased    Rt Mid Trap MMT Decreased    Lt Mid Trap MMT Decreased    Rt SA MMT Decreased    Lt SA MMT Decreased         Assessment  Good improvement with ROM.  Pt encouraged to try to wear foam pad.  Also to use wash cloth on R trunk in shower to help with desensitization.    Goals (to be met in 10 visits)   LYMPHEDEMA GOALS Not Met Progressing Toward Partially Met Met   Improve chest tissue/scar mobility to WFL to allow patient to reach overhead without pain           Pt to be independent with self MLD, ROM exercises, and donning/doffing compression  bandages/garments to facilitate swelling reduction and to be independent at self-management once discharged.           Increase B shoulder AROM to flex 165, abd 170, ER 90, IR to T9 to improve ease of dressing/bathing/and reaching overhead.           Increase B UE strength to at least 4/5 to improve ease of lifting and performing household chores.           Arm volume measurements to be completed to have baseline measurements for early lymphedema identification                            Plan  Cont as per plan. Progress ROM exercises as tolerate    Treatment Last 4 Visits  Treatment Day: 3       5/28/2025 5/30/2025 6/2/2025   PT Treatment   Insurance Auth # and Certification Dates Certification From: 5/28/2025  To:8/26/2025 Certification From: 5/28/2025  To:8/26/2025 Certification From: 5/28/2025  To:8/26/2025   # of Visits Used/Approved 1/10 2/10 3/10   Therapeutic Exercise Supine AAROM B shld    Sitting:  - codmans x 5 B  - cane flex and abd x 5 each  - scap retr x 10  - shld horiz abd/add x 5  - shld flex x 5  - shld abd x 5 Diaphragmatic breathing    Reviewed AROM/HEP    Supine AAROM/manual stretching B shld Reviewed diaphragmatic breathing and AROM for HEP  -Cervical AROM with towel anchored around neck for added stretch.     Manual Therapy STM B pect, abds, axilla    MLD B axilla, chest, and trunk (avoided over chest incisions) STM B pect, axilla, trunk, abd  STM R abd/area of cording    MLD B axilla, chest, trunk     Compression:  - fabricated foam compression pad for R trunk/axilla area to wear under post-op bra and ACE wrap  MEASUREMENTS TAKEN: BUE AROM    STM B pect, axilla, trunk, abd  STM R abd/area of cording    MLD B axilla, chest, trunk     Compression:  -pt having difficulty wearing foam padding.  \"It is uncomfortable\"   Therapeutic Activity Explained Lymphedema; reviewed lymphatic system, informed patient of lymphedema precautions and risk reduction practices, and importance of skin care.      Reviewed eval findings  Educated in HEP      Discussed importance of applying lotion to chest and trunk (avoid incisions) and to use a washcloth in shower to areas that tissue is hypersensitive.   Therapeutic Exercise Min 15 10    Manual Therapy Min 25 35 43   Therapeutic Activity Min 15     Evaluation Min 30     Total of Timed Procedures 55 45 43   Total of Service Based 30 0 0   Total Treatment Time 85 45 43        HEP       Charges     MM3

## 2025-06-02 NOTE — PROGRESS NOTES
Harper Ham is a 44 year old female who presents today for a follow-up after bilateral nipple sparing mastectomy with right breast sentinel lymph node biopsy (Dr. Chapin), complex closure of bilateral breasts, spy (Dr. Meyers) on 5/12/2025.     She denies fever and chills. She denies nausea, vomiting, diarrhea or constipation.   Her pain is controlled.  She is here today for wound re-check.  She is attending lymphedema therapy and reports this is helpful.  She has been compliant with activity restrictions, dressing changes and compression.    Physical Exam     Breasts: Bilateral breast incisions clean, dry and intact without wound drainage or wound dehiscence.  Bilateral mastectomy skin is without erythema, ecchymosis.  No evidence of hematoma or seroma.  Bilateral NAC viable with resolving epidermolysis, right greater than left.    There were no vitals filed for this visit.      Assessment and Plan     Harper Ham is doing well s/p bilateral nipple sparing mastectomy with right breast sentinel lymph node biopsy (Dr. Chapin), complex closure of bilateral breasts, spy (Dr. Meyers) on 5/12/2025.     Bilateral breast incisions and NAC were redressed with Neosporin, Xeroform, Telfa.  ABD, Ace wrap and bra were placed.  She will change her dressings daily.  I anticipate another week of these dressing changes then likely can change to Vaseline or Aquaphor daily.  She will follow-up in 1 to 2 weeks for recheck.  She should continue with compression and activity restrictions.  She reports she is meeting with oncology on 6/5/2025.    She will follow up on 6/27 with Dr. Meyers. She was encouraged to contact our office with any questions or concerns.     Questions were answered. Patient understands.     The 21st Century Cures Act makes medical notes like these available to patients in the interest of transparency. Please be advised this is a medical document. Medical documents are intended to carry relevant information,  facts as evident, and the clinical opinion of the practitioner. The medical note is intended as peer to peer communication and may appear blunt or direct. It is written in medical language and may contain abbreviations or verbiage that are unfamiliar.

## 2025-06-05 ENCOUNTER — OFFICE VISIT (OUTPATIENT)
Dept: PHYSICAL THERAPY | Facility: HOSPITAL | Age: 45
End: 2025-06-05
Payer: COMMERCIAL

## 2025-06-05 ENCOUNTER — OFFICE VISIT (OUTPATIENT)
Age: 45
End: 2025-06-05
Attending: INTERNAL MEDICINE
Payer: COMMERCIAL

## 2025-06-05 VITALS
BODY MASS INDEX: 23 KG/M2 | DIASTOLIC BLOOD PRESSURE: 76 MMHG | TEMPERATURE: 97 F | RESPIRATION RATE: 16 BRPM | HEART RATE: 72 BPM | WEIGHT: 150 LBS | OXYGEN SATURATION: 97 % | SYSTOLIC BLOOD PRESSURE: 126 MMHG

## 2025-06-05 DIAGNOSIS — Z17.0 MALIGNANT NEOPLASM OF UPPER-OUTER QUADRANT OF RIGHT BREAST IN FEMALE, ESTROGEN RECEPTOR POSITIVE (HCC): Primary | ICD-10-CM

## 2025-06-05 DIAGNOSIS — R92.333 HETEROGENEOUSLY DENSE TISSUE OF BOTH BREASTS ON MAMMOGRAPHY: ICD-10-CM

## 2025-06-05 DIAGNOSIS — C50.911 INVASIVE LOBULAR CARCINOMA OF RIGHT BREAST IN FEMALE (HCC): ICD-10-CM

## 2025-06-05 DIAGNOSIS — N95.9 PREMENOPAUSAL PATIENT: ICD-10-CM

## 2025-06-05 DIAGNOSIS — Z90.13 STATUS POST BILATERAL MASTECTOMY: ICD-10-CM

## 2025-06-05 DIAGNOSIS — C50.411 MALIGNANT NEOPLASM OF UPPER-OUTER QUADRANT OF RIGHT BREAST IN FEMALE, ESTROGEN RECEPTOR POSITIVE (HCC): Primary | ICD-10-CM

## 2025-06-05 PROCEDURE — 97140 MANUAL THERAPY 1/> REGIONS: CPT | Performed by: PHYSICAL THERAPIST

## 2025-06-05 PROCEDURE — 97110 THERAPEUTIC EXERCISES: CPT | Performed by: PHYSICAL THERAPIST

## 2025-06-05 NOTE — PROGRESS NOTES
Patient: Harper Ham (44 year old, female) Referring Provider:  Insurance:   Diagnosis: Invasive lobular carcinoma of breast in female (HCC) (C50.919)  Absence of both breasts (Z90.13) Suad Johnson  [employer] [place]   Date of Surgery: 5/12/2025  N/A   Precautions:  Cancer  Referral Information:    Date of Evaluation: Req: 0, Auth: 0, Exp:     05/28/25 POC Auth Visits:          Today's Date   6/5/2025    Subjective  \"I'm able to do my hair better\"       Pain: 3/10 (R shld and axilla)     Objective  Decreased ROM B shld, cording R abd and R axilla (but mild), decreased tissue mobility chest     Assessment  Advanced ROM exercise    Goals (to be met in 10 visits)      LYMPHEDEMA GOALS Not Met Progressing Toward Partially Met Met   Improve chest tissue/scar mobility to WFL to allow patient to reach overhead without pain       Pt to be independent with self MLD, ROM exercises, and donning/doffing compression bandages/garments to facilitate swelling reduction and to be independent at self-management once discharged.       Increase B shoulder AROM to flex 165, abd 170, ER 90, IR to T9 to improve ease of dressing/bathing/and reaching overhead.       Increase B UE strength to at least 4/5 to improve ease of lifting and performing household chores.       Arm volume measurements to be completed to have baseline measurements for early lymphedema identification                              Plan  Cont as per plan. Progress ROM exercises as tolerate    Treatment Last 4 Visits  Treatment Day: 4       5/28/2025 5/30/2025 6/2/2025 6/5/2025   PT Treatment   Insurance Auth # and Certification Dates Certification From: 5/28/2025  To:8/26/2025 Certification From: 5/28/2025  To:8/26/2025 Certification From: 5/28/2025  To:8/26/2025 Certification From: 5/28/2025  To:8/26/2025   # of Visits Used/Approved 1/10 2/10 3/10 4/10   Therapeutic Exercise Supine AAROM B shld    Sitting:  - codmans x 5 B  - cane flex and abd x 5 each  - scap retr x 10  -  shld horiz abd/add x 5  - shld flex x 5  - shld abd x 5 Diaphragmatic breathing    Reviewed AROM/HEP    Supine AAROM/manual stretching B shld Reviewed diaphragmatic breathing and AROM for HEP  -Cervical AROM with towel anchored around neck for added stretch.   Supine R shld AAROM    Sidely shld horiz abd/add x 5 B  Sidely windmills CW/CCW x 10 each B  Supine lower trunk rotation x 5     Manual Therapy STM B pect, abds, axilla    MLD B axilla, chest, and trunk (avoided over chest incisions) STM B pect, axilla, trunk, abd  STM R abd/area of cording    MLD B axilla, chest, trunk     Compression:  - fabricated foam compression pad for R trunk/axilla area to wear under post-op bra and ACE wrap  MEASUREMENTS TAKEN: BUE AROM    STM B pect, axilla, trunk, abd  STM R abd/area of cording    MLD B axilla, chest, trunk     Compression:  -pt having difficulty wearing foam padding.  \"It is uncomfortable\" STM R deltoid, pect, and areas of cording (axilla and abd), STM abd    MLD strokes to R trunk, axilla, upper arm      Compression:  - pt wearing new sports bra: good compression except has \"cups\", discussed getting soft bra inserts   Therapeutic Activity Explained Lymphedema; reviewed lymphatic system, informed patient of lymphedema precautions and risk reduction practices, and importance of skin care.     Reviewed eval findings  Educated in HEP      Discussed importance of applying lotion to chest and trunk (avoid incisions) and to use a washcloth in shower to areas that tissue is hypersensitive.    Therapeutic Exercise Min 15 10  20   Manual Therapy Min 25 35 43 25   Therapeutic Activity Min 15      Evaluation Min 30      Total of Timed Procedures 55 45 43 45   Total of Service Based 30 0 0 0   Total Treatment Time 85 45 43 45        HEP       Charges     mm2, ex1

## 2025-06-05 NOTE — PATIENT INSTRUCTIONS
We will arrange for goserelin injections monthly for ovarian function suppression as part of endocrine therapy.   I will follow up with you 2 weeks after the second injection to check your hormone levels. We will draw the blood in the clinic prior to my visit.

## 2025-06-05 NOTE — PROGRESS NOTES
[facility] Hematology Oncology  Follow up Note    Patient Name: Harper Ham   YOB: 1980   Medical Record Number: [id number]   CSN: [id number]   Attending Physician: Esha Wise MD     Date of Visit: 6/5/2025     Chief Complaint:  Breast cancer     Oncologic History:    Harper Stanley a 44 year old White female with no significant medical history referred for evaluation of newly diagnosed breast cancer. Her oncologic history is as follows:     2/2025: patient self palpated a kenyetta in there right upper outer breast at the end of February.      3/18/25: bilateral diagnostic mammogram and right ultrasound revealed a 1.6 cm suspicious mass at 9:30 position, 5 cm from nipple, and a smaller adjacent 0.7 cm mass at 9:00, 6 cm from nipple, presumably similar pathology. No enlarged or abnormal lymph nodes in the right axilla.       3/26/25: MRI breast    3/28/25: US guided biopsy of R breast mass 9:00, 6 cm from nipple showed invasive lobular carcinoma, grade 2, ER 98%, IL 99%, HER2 2+ IHC, FISH pending, Ki-67 4%.   9:30 mass, 5 cm from nipple: invasive ductal carcinoma, grade 1, ER 90%, IL 99%, HER2 2+ IHC, FISH pending, Ki-67 4%.      4/1/25: 18F-FES PET/CT Mildly hypermetabolic lesion in the outer right breast with adjacent biopsy clip. No axillary or internal mammary hypermetabolic nodes. No distant disease     5/12/25: bilateral nipple sparing mastectomies with right axillary sentinel node biopsy and skin closure (Dr. Chapin/Dr. Meyers): 2 foci of invasive lobular and ductal carcinoma, grade II, 2.1 cm and 1.0 cm, ALH. all margins were negative, 0/1 lymph node positive. pT2(m)N0    Immediate reconstruction was aborted due to mastectomy skin flap hypoperfusion right greater than left- attributed to smoking history.     Interval History:  Post-operative follow-up: Patient returns for review of pathology results and adjuvant therapy discussion.  She tolerated surgery well and is recovering  favorably.  Reports minimal incisional pain and some mild discomfort.  No swelling, erythema, or drainage noted.  Menstrual cycles are regular; however, she reports possible perimenopausal symptoms including hot flashes and night sweats. She is considering delayed reconstruction with Dr. Meyers.     Performance Status: ECOG 0    Current Medications:  Medications - Current[1]       Review of Systems:  10 -point systems reviewed, all negative except as mentioned in the HPI/interval history.     Vital Signs:  /76 (BP Location: Left arm, Patient Position: Sitting, Cuff Size: adult)   Pulse 72   Temp 97.1 °F (36.2 °C) (Tympanic)   Resp 16   Wt 68 kg (150 lb)   LMP 04/18/2025 (Exact Date)   SpO2 97%   BMI 22.81 kg/m²     Physical Examination:  General: Aert and oriented x 3, not in acute distress.  Psych:  Mood and affect appropriate  HEENT: Non-icteric sclera. Oropharynx is clear.   Neck: No palpable lymphadenopathy. Neck is supple.  Breast: S/p bilateral nipple sparing mastectomies. Incisions are healing well with no edema or erythema.   Lymphatics: No palpable lymphadenopathy in the cervical, supraclavicular, axillary, or inguinal regions.  Chest: Clear to auscultation.  Heart: Regular rate and rhythm. S1S2 normal.  Abdomen: Soft, non tender.   Extremities: No peripheral edema.  Neurological: Grossly intact.     Laboratory:  Lab Results   Component Value Date    WBC 7.4 04/16/2025    RBC 4.43 04/16/2025    HGB 13.0 04/16/2025    HCT 39.8 04/16/2025    MCV 89.8 04/16/2025    MCH 29.3 04/16/2025    MCHC 32.7 04/16/2025    RDW 12.3 04/16/2025    .0 04/16/2025    MPV 9.2 12/17/2018     Lab Results   Component Value Date     04/18/2025    K 4.5 04/18/2025     04/18/2025    CO2 25.0 04/18/2025    BUN 10 04/18/2025    CREATSERUM 0.70 04/18/2025    GLU 91 04/18/2025    CA 8.9 04/18/2025    ALKPHO 53 04/18/2025    ALT 16 04/18/2025    AST 17 04/18/2025    BILT 0.4 04/18/2025    ALB 4.5 04/18/2025     TP 7.2 04/18/2025       Radiology:  NM SENTINEL NODE/LYMPHATIC  (CPT=78195)  Result Date: 5/12/2025  CONCLUSION:  Right axillary lymph node identified.   LOCATION:  [facility]   Dictated by (CST): Nando Ramirez MD on 5/12/2025 at 11:27 AM     Finalized by (CST): Nando Ramirez MD on 5/12/2025 at 11:27 AM       XR CHEST PA + LAT CHEST (DSS=21150)  Result Date: 4/18/2025  CONCLUSION: No acute cardiopulmonary disease.    Dictated by (CST): South Shaikh MD on 4/18/2025 at 8:54 AM     Finalized by (CST): South Shaikh MD on 4/18/2025 at 8:55 AM          MRI BREAST (W+WO) W/CAD BILAT (CPT=77049)  Result Date: 3/27/2025  CONCLUSION:    Continued surgical/oncologic management recommended for the following: -2.3 cm MRI mass corresponding to the 9:30 o'clock 5 cm from the nipple 1.6 cm sonographic mass, marked by a vision clip. -1.0 cm MRI mass corresponding to the 9 o'clock 6 cm from the nipple 0.7 cm sonographic mass, marked by a Q clip.  Extensive non mass enhancement with satellite lesions involving the majority of the right breast in the lower outer, lower central, outer central, upper outer, upper central, and central regions.  Given the continuity with biopsy-proven I LC, this is thought to represent the same process and is concerning for extensive multicentric disease.  If pathologic confirmation of multicentric involved is required, biopsy can be performed of extensive non mass enhancement measuring 7.3 x 6.1 by 4.3 cm (series 18664, image 25 and series 23120 image 59).   6 mm ovoid enhancement along the right internal mammary chain, thought to represent a small equivocal internal mammary chain lymph node.  Correlate with the clinical assessment.  Consider further evaluation with PET-CT.  1.9 cm cyst in the outer left breast, corresponding to physician area of palpable concern    BI-RADS CATEGORY: DIAGNOSTIC CATEGORY 4 - SUSPICIOUS   RECOMMENDATIONS:   MRI-GUIDED BREAST BIOPSY: RIGHT BREAST  x1 possibly   CLINICAL EVALUATION.   SURGICAL CONSULTATION.    PLEASE NOTE: A NORMAL MRI DOES NOT EXCLUDE THE POSSIBILITY OF BREAST CANCER.  A CLINICALLY SUSPICIOUS PALPABLE LUMP SHOULD BE BIOPSIED.         Dictated by (CST): Zoie Salamanca MD on 3/26/2025 at 2:52 PM     Finalized by (CST): Zoie Salamanca MD on 3/27/2025 at 10:05 AM          US BREAST LEFT LIMITED (CPT=76642)  Result Date: 3/26/2025  CONCLUSION:     Benign simple cyst measuring 1.6 cm corresponds to the left breast palpable abnormality.  Review of prior imaging demonstrates multiple benign simple cysts in the left breast, which corroborates this finding.  Recommend continued surgical/oncologic management of biopsy-proven malignancy in the right breast.  BI-RADS CATEGORY:  DIAGNOSTIC CATEGORY 2 - BENIGN FINDING:   RECOMMENDATIONS:  CLINICAL EVALUATION.   SURGICAL CONSULTATION.         Dictated by (CST): Ar Garcia MD on 3/26/2025 at 10:59 AM     Finalized by (CST): Ar Garcia MD on 3/26/2025 at 11:07 AM          US BREAST BIOPSY 2 SITE RIGHT (CPT=19083/58155)  Addendum Date: 3/24/2025  ADDENDUM: Addendum submitted as pathology results have become available.   Surgical/oncologic management recommended for biopsy-proven invasive lobular carcinoma on sampling of right 9 o'clock 6 cm and 9:30 o'clock 5 cm from the nipple masses.  Given patient age and lobular pathology, further evaluation with breast MRI is recommended.   Final Diagnosis: A. Right breast, 9:00, 6 cm from nipple; ultrasound-guided core needle biopsy: * Invasive lobular carcinoma, grade 2 of 3 (Peculiar Histologic Score 6/9: tubular differentiation 3/3, nuclear pleomorphism 2/3, mitotic activity 1/3) * The maximum length of tumor area in the tissue submitted is 5 mm * Background atypical lobular hyperplasia present  B. Right breast, 9:30, 5 cm from nipple; ultrasound-guided core needle biopsy: * Invasive lobular carcinoma with focal ductal differentiation, grade 1 of 3 (Jorgito Histologic  Score 5/9: tubular differentiation 2/3, nuclear pleomorphism 2/3, mitotic activity 1/3) * The maximum length of tumor area in the tissue submitted is 9.5 mm * Background atypical lobular hyperplasia present    Dictated by (CST): Zoie Salamanca MD on 3/24/2025 at 6:39 AM     Finalized by (CST): Zoie Salamanca MD on 3/24/2025 at 6:41 AM             Result Date: 3/24/2025  CONCLUSION:   Uneventful ultrasound-guided biopsies of the RIGHT breast masses. Post procedure mammogram demonstrates the clips in the appropriate position.   RECOMMENDATION:  Report will be updated once pathology results are finalized.    Dictated by (CST): Romulo Rivera DO on 3/18/2025 at 2:18 PM     Finalized by (CST): Romulo Rivera DO on 3/18/2025 at 2:20 PM      [address] [place] [address] [phone]    [facility] POST PROCEDURE IMAGE RIGHT (CPT=77065)  Result Date: 3/18/2025  PROCEDURE: [place] POST PROCEDURAL IMAGE RIGHT (CPT=77065)  COMPARISON: [facility], [place] [facility] LUCAS 2D+3D DIAGNOSTIC INGRID BILAT (ICZ=[id number]), 3/18/2025, 9:12 AM.  INDICATIONS: N63.10 Breast mass, right  BREAST COMPOSITION: Category C - The breasts are heterogeneously dense, which may obscure small masses.  TECHNIQUE: Routine post procedure mammogram images performed.   FINDINGS: Post procedure mammogram demonstrates the clips in the appropriate positions.  BI-RADS CATEGORY:  ASSESSMENT: POST-PROCEDURE MAMMOGRAM FOR MARKER PLACEMENT.     RECOMMENDATIONS:  Please refer to the same day breast procedure report for further details.   Dictated by (CST): Romulo Rivera DO on 3/18/2025 at 1:48 PM     Finalized by (CST): Romulo Rivera DO on 3/18/2025 at 1:51 PM      [address] [place], [place] [id number] [phone]    [facility] LUCAS 2D+3D DIAGNOSTIC INGRID  BILAT (ZLR=[id number])  Result Date: 3/18/2025  CONCLUSION:     1. At 9:30, 5 cm from the nipple, there is a 1.6 cm  mass which is highly suggestive of malignancy.  This probably correlates with the right breast upper outer quadrant architectural distortion noted on the mammogram.  Ultrasound-guided biopsy is recommended.  If postprocedural mammography clip placement does not correspond to the architectural distortion, additional stereotactic biopsy is recommended.  2. At 9 o'clock, 6 cm from the nipple, immediately adjacent to the mass at 9:30, 5 cm from the nipple, there is a 0.7 cm mass which is suspicious for malignancy.  This should be presumed similar pathology to the above mass.  3. No enlarged or abnormal appearing lymph nodes in the right axilla.    BI-RADS CATEGORY:   DIAGNOSTIC CATEGORY 5 - HIGHLY SUGGESTIVE OF MALIGNANCY.   RECOMMENDATIONS:  STEREOTACTIC BREAST BIOPSY: RIGHT BREAST X1 SITE   ULTRASOUND-GUIDED CORE BIOPSY: RIGHT BREAST X1 SITE       PLEASE NOTE: NORMAL MAMMOGRAM DOES NOT EXCLUDE THE POSSIBILITY OF BREAST CANCER.  A CLINICALLY SUSPICIOUS PALPABLE LUMP SHOULD BE BIOPSIED.   For patients over the age of 40, the target due date for the patient's next mammogram has been entered into a reminder system.   Patient received a discharge summary from the technologist after completion of exam. Findings and recommendations were discussed with the patient immediately following exam. Patient verbalized understanding.  Breast marker legend used on images  Triangle = Palpable lump [place] = Skin tag or mole BB = Nipple Linear chin = Scar Square = Pain    Dictated by (CST): Ar Garcia MD on 3/18/2025 at 11:34 AM     Finalized by (CST): Ar Garcia MD on 3/18/2025 at 12:27 PM          US BREAST RIGHT LIMITED (CPT=76642)  Result Date: 3/18/2025  PROCEDURE: US BREAST RIGHT LIMITED (CPT=76642)  COMPARISON: [facility], [facility] LUCAS 2D+3D DIAGNOSTIC [facility] BILAT (USE=[id number]), 3/18/2025, 9:12 AM.  [facility], US BREAST LEFT LIMITED  (CPT=76642), 9/28/2021, 9:30 AM.   INDICATIONS: RT breast palp  TECHNIQUE:  Breast ultrasound was performed with evaluation only on specific areas of concern.   FINDINGS: Ultrasound report is dictated under same-day diagnostic mammogram report.  Please refer to the separately dictated same day diagnostic mammogram report for findings and recommendations.       Dictated by (CST): rA Garcia MD on 3/18/2025 at 11:25 AM     Finalized by (CST): Ar Garcia MD on 3/18/2025 at 11:34 AM             Pathology:    A. Right axillary sentinel lymph node, #1, excision:  -One lymph node, negative for carcinoma (0/1).      B. Right base of nipple, excision:  -Unremarkable breast tissue, negative for carcinoma.      C. Right breast, mastectomy:  -Two foci of invasive carcinoma with mixed ductal and lobular features, grade 2, largest focus measures 21 mm (2.1 cm).   -Lobular neoplasia (atypical lobular hyperplasia/lobular carcinoma in situ) with associated microcalcifications.  -All margins of excision are widely free of invasive and in situ carcinoma.  -Focal lymphovascular invasion.   -Biopsy site changes are present.   -Unremarkable skeletal muscle.   -Benign breast tissue with associated microcalcifications.      D. Left breast, mastectomy:  -Breast tissue with columnar cell change, apocrine metaplasia, microcysts, and pseudoangiomatous stromal hyperplasia.   -Benign breast tissue with associated microcalcifications.      Impression and Plan:    Right breast multifocal invasive mixed ductal and lobular carcinoma, grade II, ER/OK positive, HER2 negative, Ki-67 4%, stage IIA    S/p bilateral mastectomies and right sentinel node biopsy 5/12/25 (Dr. Chapin): 2 foci of IDC, grade II, 2.1 cm and 1.0 cm, ALH. all margins were negative, 0/1 lymph node positive. pT2(m)N0  Oncotype Dx recurrence score 22, indicating 8% risk of distant recurrence with endocrine therapy and NO chemotherapy benefit.     We reviewed adjuvant endocrine therapy, carrying a nearly 40%  reduction in the risk of recurrence. Treatment options for premenopausal women, such as tamoxifen with or without ovarian suppression, or anastrozole with ovarian suppression, were discussed, with a minimum recommended duration of 5 years.    Given her age and recurrence score, I recommend ovarian function suppression (OFS) combined with an aromatase inhibitor (AI) for greater risk reduction, based on evidence from the SOFT and TEXT trials.    We reviewed the treatment regimen of goserelin 3.6 mg injections administered monthly with anastrozole daily for at least 5 years. We discussed potential side effects, including but not limited to fatigue, hot flashes, night sweats, myalgia, arthralgia, vaginal dryness, hair thinning, decreased bone density, and lipid abnormalities.    We also discussed that bilateral oophorectomy could be considered if the patient prefers, but from an oncologic standpoint, it is not indicated at this time.    The patient agrees to proceed with the plan. The first goserelin injection will be scheduled next week, and her hormone levels will be checked in 6 weeks ( 2 weeks after the second dose). If her estradiol levels are adequately suppressed to menopausal levels, we will initiate anastrozole.      70 minutes spent on this encounter, including review of data and medical records (pathology, imaging results), charting and documenting the encounter, with more than 50% of that time was spent counseling the patient and/or on coordination of care.  The diagnosis, prognosis, and general treatment was explained to the patient and her spouse. All questions answered to their satisfaction.       Esha Wise MD   [facility] H[facility]            [1]   Current Outpatient Medications:     HYDROcodone-acetaminophen 5-325 MG Oral Tab, Take 1-2 tablets by mouth every 6 (six) hours as needed for Pain., Disp: 20 tablet, Rfl: 0    ondansetron (ZOFRAN) 4 mg tablet, Take 1 tablet (4 mg total) by  mouth every 8 (eight) hours as needed for Nausea., Disp: 12 tablet, Rfl: 0    ZINC OR, Take by mouth daily., Disp: , Rfl:     vitamin B-12 50 MCG Oral Tab, Take 1 tablet (50 mcg total) by mouth daily., Disp: , Rfl:     KRILL OIL OR, Take by mouth., Disp: , Rfl:     Ascorbic Acid (VITAMIN C OR), Take 1,000 mg by mouth daily., Disp: , Rfl:     Ergocalciferol (VITAMIN D OR), Take 2,000 Int'l Units by mouth daily., Disp: , Rfl:

## 2025-06-06 PROBLEM — E28.39 SUPPRESSION OF OVARIAN SECRETION: Status: ACTIVE | Noted: 2025-06-06

## 2025-06-07 PROBLEM — Z90.13 STATUS POST BILATERAL MASTECTOMY: Status: ACTIVE | Noted: 2025-06-07

## 2025-06-10 ENCOUNTER — OFFICE VISIT (OUTPATIENT)
Dept: PHYSICAL THERAPY | Facility: HOSPITAL | Age: 45
End: 2025-06-10
Payer: COMMERCIAL

## 2025-06-10 PROCEDURE — 97140 MANUAL THERAPY 1/> REGIONS: CPT | Performed by: PHYSICAL THERAPIST

## 2025-06-10 NOTE — PROGRESS NOTES
Patient: Harper Ham (44 year old, female) Referring Provider:  Insurance:   Diagnosis: Invasive lobular carcinoma of breast in female (HCC) (C50.919)  Absence of both breasts (Z90.13) Suad Johnson  [employer] [place] PPO   Date of Surgery: 5/12/2025  N/A   Precautions:  Cancer  Referral Information:    Date of Evaluation: Req: 0, Auth: 0, Exp:     05/28/25 POC Auth Visits:          Today's Date   6/10/2025    Subjective  \"I did something to my neck\"       Pain: 2/10 (neck 6/10, R axilla 3/10)     Objective  Pt w/ new onset neck pain. Breast incisions mostly healed (discussed no longer need xerform but to still use lotion or aquaphor), Swelling noted over B pect- appears to be due to abd binder sliding       Assessment  Advanced ROM exercises, initiated scar massage    Goals (to be met in 10 visits)      LYMPHEDEMA GOALS Not Met Progressing Toward Partially Met Met   Improve chest tissue/scar mobility to WFL to allow patient to reach overhead without pain       Pt to be independent with self MLD, ROM exercises, and donning/doffing compression bandages/garments to facilitate swelling reduction and to be independent at self-management once discharged.       Increase B shoulder AROM to flex 165, abd 170, ER 90, IR to T9 to improve ease of dressing/bathing/and reaching overhead.       Increase B UE strength to at least 4/5 to improve ease of lifting and performing household chores.       Arm volume measurements to be completed to have baseline measurements for early lymphedema identification                                  Plan  Cont as per plan. Progress ROM exercises as tolerate. Will assess ROM next session    Treatment Last 4 Visits  Treatment Day: 5       5/30/2025 6/2/2025 6/5/2025 6/10/2025   PT Treatment   Insurance Auth # and Certification Dates Certification From: 5/28/2025  To:8/26/2025 Certification From: 5/28/2025  To:8/26/2025 Certification From: 5/28/2025  To:8/26/2025 Certification From: 5/28/2025  To:8/26/2025    # of Visits Used/Approved 2/10 3/10 4/10 5/10   Therapeutic Exercise Diaphragmatic breathing    Reviewed AROM/HEP    Supine AAROM/manual stretching B shld Reviewed diaphragmatic breathing and AROM for HEP  -Cervical AROM with towel anchored around neck for added stretch.   Supine R shld AAROM    Sidely shld horiz abd/add x 5 B  Sidely windmills CW/CCW x 10 each B  Supine lower trunk rotation x 5   Wall wash CW/CCW in flex x 10 each B   Manual Therapy STM B pect, axilla, trunk, abd  STM R abd/area of cording    MLD B axilla, chest, trunk     Compression:  - fabricated foam compression pad for R trunk/axilla area to wear under post-op bra and ACE wrap  MEASUREMENTS TAKEN: BUE AROM    STM B pect, axilla, trunk, abd  STM R abd/area of cording    MLD B axilla, chest, trunk     Compression:  -pt having difficulty wearing foam padding.  \"It is uncomfortable\" STM R deltoid, pect, and areas of cording (axilla and abd), STM abd    MLD strokes to R trunk, axilla, upper arm      Compression:  - pt wearing new sports bra: good compression except has \"cups\", discussed getting soft bra inserts STM B pect, chest, axilla    Initiated gentle scar and soft tissue massage B chest w/ Eucerine. Encouraged pt to do gentle self massage. Instr pt she no longer needs xerform and non-adherant dressings, cont w/ abd pads for comfort    Compression:  - pt wearing ace wrap and compression bra. Fabricated foam compression pads for B pect tendons.     Therapeutic Activity  Discussed importance of applying lotion to chest and trunk (avoid incisions) and to use a washcloth in shower to areas that tissue is hypersensitive.     Therapeutic Exercise Min 10  20 5   Manual Therapy Min 35 43 25 40   Total of Timed Procedures 45 43 45 45   Total of Service Based 0 0 0 0   Total Treatment Time 45 43 45 45        HEP       Charges     mm3

## 2025-06-12 ENCOUNTER — OFFICE VISIT (OUTPATIENT)
Dept: PHYSICAL THERAPY | Facility: HOSPITAL | Age: 45
End: 2025-06-12
Payer: COMMERCIAL

## 2025-06-12 ENCOUNTER — TELEPHONE (OUTPATIENT)
Age: 45
End: 2025-06-12

## 2025-06-12 ENCOUNTER — NURSE ONLY (OUTPATIENT)
Age: 45
End: 2025-06-12
Attending: INTERNAL MEDICINE
Payer: COMMERCIAL

## 2025-06-12 DIAGNOSIS — C50.411 MALIGNANT NEOPLASM OF UPPER-OUTER QUADRANT OF RIGHT BREAST IN FEMALE, ESTROGEN RECEPTOR POSITIVE (HCC): ICD-10-CM

## 2025-06-12 DIAGNOSIS — Z17.0 MALIGNANT NEOPLASM OF UPPER-OUTER QUADRANT OF RIGHT BREAST IN FEMALE, ESTROGEN RECEPTOR POSITIVE (HCC): ICD-10-CM

## 2025-06-12 DIAGNOSIS — E28.39 SUPPRESSION OF OVARIAN SECRETION: Primary | ICD-10-CM

## 2025-06-12 PROCEDURE — 97140 MANUAL THERAPY 1/> REGIONS: CPT | Performed by: PHYSICAL THERAPIST

## 2025-06-12 NOTE — PROGRESS NOTES
Patient: Harper Ham (44 year old, female) Referring Provider:  Insurance:   Diagnosis: Invasive lobular carcinoma of breast in female (HCC) (C50.919)  Absence of both breasts (Z90.13) Suad Johnson  [employer] [place] P[place]   Date of Surgery: 5/12/2025  N/A   Precautions:  Cancer  Referral Information:    Date of Evaluation: Req: 0, Auth: 0, Exp:     05/28/25 POC Auth Visits:          Today's Date   6/12/2025    Subjective  Pt late due to traffic. \"Neck is better\"       Pain: 2/10     Objective  Cording R abd, decreased ROM R shld, decreased strength       Assessment  Swelling of trunk decreasing (due to pt late, defer assessing ROM until next session)    Goals (to be met in 10 visits)      LYMPHEDEMA GOALS Not Met Progressing Toward Partially Met Met   Improve chest tissue/scar mobility to WFL to allow patient to reach overhead without pain       Pt to be independent with self MLD, ROM exercises, and donning/doffing compression bandages/garments to facilitate swelling reduction and to be independent at self-management once discharged.       Increase B shoulder AROM to flex 165, abd 170, ER 90, IR to T9 to improve ease of dressing/bathing/and reaching overhead.       Increase B UE strength to at least 4/5 to improve ease of lifting and performing household chores.       Arm volume measurements to be completed to have baseline measurements for early lymphedema identification                                      Plan  Cont as per plan. Progress ROM exercises as tolerate. Will assess ROM next session    Treatment Last 4 Visits  Treatment Day: 6       6/2/2025 6/5/2025 6/10/2025 6/12/2025   PT Treatment   Insurance Auth # and Certification Dates Certification From: 5/28/2025  To:8/26/2025 Certification From: 5/28/2025  To:8/26/2025 Certification From: 5/28/2025  To:8/26/2025 Certification From: 5/28/2025  To:8/26/2025   # of Visits Used/Approved 3/10 4/10 5/10 6/10   Therapeutic Exercise Reviewed diaphragmatic breathing and AROM  for HEP  -Cervical AROM with towel anchored around neck for added stretch.   Supine R shld AAROM    Sidely shld horiz abd/add x 5 B  Sidely windmills CW/CCW x 10 each B  Supine lower trunk rotation x 5   Wall wash CW/CCW in flex x 10 each B Doorway pect stretches x 5  Doorway shld flex slides x 5   Manual Therapy MEASUREMENTS TAKEN: BUE AROM    STM B pect, axilla, trunk, abd  STM R abd/area of cording    MLD B axilla, chest, trunk     Compression:  -pt having difficulty wearing foam padding.  \"It is uncomfortable\" STM R deltoid, pect, and areas of cording (axilla and abd), STM abd    MLD strokes to R trunk, axilla, upper arm      Compression:  - pt wearing new sports bra: good compression except has \"cups\", discussed getting soft bra inserts STM B pect, chest, axilla    Initiated gentle scar and soft tissue massage B chest w/ Eucerine. Encouraged pt to do gentle self massage. Instr pt she no longer needs xerform and non-adherant dressings, cont w/ abd pads for comfort    Compression:  - pt wearing ace wrap and compression bra. Fabricated foam compression pads for B pect tendons.   STM B pect, chest, axilla    Manual stretching R abd/cording    Gentle scar and soft tissue massage B chest w/ Eucerine.     MLD strokes to R trunk, axilla, upper arm      Compression:  - Discussed weaning from ACE wrap. Pt wearing compression bra, inserted pt's foam prosthesis- provides good compression of chest/trunk. Discussed to also try wearing her smaller sized compression bra (wear only if not painful). Discussed if unable to tolerate wearing foam prosthesis to remove and put ACE back on as the foam prosthesis does provide compression of chest     Therapeutic Activity Discussed importance of applying lotion to chest and trunk (avoid incisions) and to use a washcloth in shower to areas that tissue is hypersensitive.      Therapeutic Exercise Min  20 5 5   Manual Therapy Min 43 25 40 30   Total of Timed Procedures 43 45 45 35   Total  of Service Based 0 0 0 0   Total Treatment Time 43 45 45 35        HEP       Charges     mm2

## 2025-06-12 NOTE — TELEPHONE ENCOUNTER
Called patient - unable to reach LM on Vm lupron is approved and have appointment for tomorrow 6/13 at 3:00 pm held asked to please call back.  Number provided for call back.

## 2025-06-12 NOTE — PROGRESS NOTES
Pt here to start zoladex injections  However, she reports lymphedema issues post-mastectomy and a \"cording\" that drains to her abdomen  She states that she and  discussed a shot that goes into the buttocks or arm, not the abdomen    I clarified this with Dr Wise's office - Marisa WINSTON spoke with  and messaged me back  Pt to start Lupron IM instead of the Zoladex; zoladex discontinued.  I informed pt  Prior auth contacted by Marisa WINSTON - Michelle WOODARD said that her insurance has not approved Lupron as yet   Marisa WINSTON will call pt once Lupron is authorized to schedule the medication  I let Harper and her mom know - they were very agreeable and thankful  I printed out and provided info on Lupron for them    Discharged stable to home - Marisa to contact pt  Gait steady, indep

## 2025-06-13 ENCOUNTER — NURSE ONLY (OUTPATIENT)
Age: 45
End: 2025-06-13
Attending: INTERNAL MEDICINE
Payer: COMMERCIAL

## 2025-06-13 DIAGNOSIS — Z17.0 MALIGNANT NEOPLASM OF UPPER-OUTER QUADRANT OF RIGHT BREAST IN FEMALE, ESTROGEN RECEPTOR POSITIVE (HCC): Primary | ICD-10-CM

## 2025-06-13 DIAGNOSIS — E28.39 SUPPRESSION OF OVARIAN SECRETION: ICD-10-CM

## 2025-06-13 DIAGNOSIS — C50.411 MALIGNANT NEOPLASM OF UPPER-OUTER QUADRANT OF RIGHT BREAST IN FEMALE, ESTROGEN RECEPTOR POSITIVE (HCC): Primary | ICD-10-CM

## 2025-06-16 ENCOUNTER — TELEPHONE (OUTPATIENT)
Dept: SURGERY | Facility: CLINIC | Age: 45
End: 2025-06-16

## 2025-06-16 ENCOUNTER — OFFICE VISIT (OUTPATIENT)
Dept: SURGERY | Facility: CLINIC | Age: 45
End: 2025-06-16
Payer: COMMERCIAL

## 2025-06-16 DIAGNOSIS — Z90.13 ABSENCE OF BOTH BREASTS: Primary | ICD-10-CM

## 2025-06-16 PROCEDURE — 99024 POSTOP FOLLOW-UP VISIT: CPT

## 2025-06-16 NOTE — TELEPHONE ENCOUNTER
Calling pt in regards to scheduling surgery.  Informed pt that I have 11/12/2025 available at [facility] with Dr. Meyers.  Pt verbalized understanding and in agreement with date and location.  All questions answered.   Encouraged pt to call or [employer] message office with any other questions or concerns.

## 2025-06-16 NOTE — PATIENT INSTRUCTIONS
THIS IS NOT A PRE-OP    Surgeon:         Dr. Dejan Meyers                                        Tel:         [phone]                                  Fax:        [phone]    Surgery/Procedure: Delayed breast reconstruction with placement of bilateral breast tissue expanders and acellular dermal matrix, 2.5 hours, general anesthesia, outpatient     Dx Code:     Hospital:  [facility]: [address] [place] [address]           [phone]  [facility]: [address], [place] [address]               [phone]    1. Someone will need to drive you to and from the hospital if your procedure is outpatient.    2.Do not drink alcohol or smoke 24 hours prior to your procedure.    3. Bring a picture ID and your insurance card.    4. You will be contacted by the hospital the day before to confirm the procedure time and location.     5. Do not take any herbal supplements or blood thinners at least one week before your procedure/surgery. This includes NSAID's (aspirin, baby aspirin, Motrin, Ibuprofen, Aleve, Advil, Naproxen, etc), Fish oil, vitamin E, turmeric, CoQ10, or green tea supplements, etc. *TYLENOL or acetaminophen is ok to take*    6. PRE-OPERATIVE TESTING: History and physical with medical clearance is REQUIRED within 30 days of the surgery date and is mandatory per Dr. Meyers. *If this is not done, your surgery will be postponed. To avoid delays with your clearance, please ensure your PCP appointment is at least 14 days prior to your surgical date.*  MEDICAL CLEARANCE WITH  ____  CBC  CMP  EKG (within 90 days)    7. If you take Coumadin, Plavix, Xarelto, or Eliquis, please contact your prescribing physician for special instructions on how long to hold. If you take insulin, contact your primary care physician for special instructions.     8. Please inform us if you start or change any medications at least one week before surgery (ex: blood thinners, weight loss medications,  diabetic medications, herbal supplements, etc)    9. Does patient have diagnosis of sleep apnea?    [   ] Yes     [   ]  No    Consent obtained  Photos taken on ______

## 2025-06-16 NOTE — PROGRESS NOTES
Grace Heller was seen and treated in our emergency department on 12/29/2022. She may return to work on . If you have any questions or concerns, please don't hesitate to call.       Obinna Mary, DO Harper Ham is a 44 year old female who presents today for a follow-up after bilateral nipple sparing mastectomy with right breast sentinel lymph node biopsy (Dr. Chapin), complex closure of bilateral breasts, spy (Dr. Meyers) on 5/12/2025.     She continues to deny fever and chills. She denies nausea, vomiting, diarrhea or constipation.   Her pain is controlled.  Patient continues to attend her lymphedema therapy.    Physical Exam     Breasts: Bilateral breast incisions are clean, dry, intact.  No evidence of hematoma or seroma bilaterally.  Bilateral mastectomy skin is without erythema.  Bilateral nipples are viable.  Right sentinel lymph node biopsy incision is clean, dry, intact with no evidence of hematoma or seroma.    There were no vitals filed for this visit.      Assessment and Plan     Harper Ham is doing well s/p bilateral nipple sparing mastectomy with right breast sentinel lymph node biopsy (Dr. Chapin), complex closure of bilateral breasts, spy (Dr. Meyers) on 5/12/2025.     Patient is here today for wound check.  The patient may now discontinue the enhancement dressing.  The patient may apply a thin layer of Vaseline or Aquaphor to her incisions daily.    ABD pads were placed in the patient's Ace wrap per her comfort.  Ace wrap and surgical bra were applied.  Compression activity guidelines were reviewed with the patient and her mother.  The patient is following up for wound check with Dr. Meyers on 6-27.  I will look for a expander surgical date for patient.  She was encouraged to contact the office with any additional questions or concerns.    Questions were answered. Patient understands.     The 21st Century Cures Act makes medical notes like these available to patients in the interest of transparency. Please be advised this is a medical document. Medical documents are intended to carry relevant information, facts as evident, and the clinical opinion of the practitioner. The medical note is  intended as peer to peer communication and may appear blunt or direct. It is written in medical language and may contain abbreviations or verbiage that are unfamiliar.     Tammy Silva, APRN  6/16/2025  11:32 AM

## 2025-06-17 ENCOUNTER — OFFICE VISIT (OUTPATIENT)
Dept: PHYSICAL THERAPY | Facility: HOSPITAL | Age: 45
End: 2025-06-17
Payer: COMMERCIAL

## 2025-06-17 PROCEDURE — 97110 THERAPEUTIC EXERCISES: CPT

## 2025-06-17 PROCEDURE — 97140 MANUAL THERAPY 1/> REGIONS: CPT

## 2025-06-17 NOTE — PROGRESS NOTES
Patient: Harper Ham (44 year old, female) Referring Provider:  Insurance:   Diagnosis: Invasive lobular carcinoma of breast in female (HCC) (C50.919)  Absence of both breasts (Z90.13) Suad Johnson  [employer] [place] PPO   Date of Surgery: 5/12/2025  N/A   Precautions:  Cancer  Referral Information:    Date of Evaluation: Req: 0, Auth: 0, Exp:     05/28/25 POC Auth Visits:          Today's Date   6/17/2025    Subjective  Pt feeling better.  \"I have been driving a little and it has been OK.\"       Pain: 2/10     Objective  MEASUREMENTS TAKEN: BUE AROM/MMT   Shoulder       5/28/2025 6/2/2025 6/17/2025   Shoulder ROM/MMT   Rt Flexion Shoulder 75 130 140   Lt Flexion Shoulder 95 135 155   Rt Flexion Shoulder MMT Decreased  4+   Lt Flexion Shoulder MMT Decreased  4+   Rt Extension Shoulder 25 40 50   Lt Extension Shoulder 40 45 55   Rt Extension Shoulder MMT Decreased     Rt Extension Shoulder MMT Decreased     Rt Abduction Shoulder 70 110 140   Lt Abduction Shoulder 85 115 155   Rt Abduction Shoulder MMT (C5) Decreased  4+   Lt Abduction Shoulder MMT (C5) Decreased  4+   Rt IR Shoulder L4 T9 T7   Lt IR Shoulder L1 T7 T7   Rt IR Shoulder MMT Decreased  4+   Lt IR Shoulder MMT Decreased  4+   Rt ER Shoulder 70       at 70 degrees abd 80 85   Lt ER Shoulder 90 90 90   Rt ER Shoulder MMT Decreased  4+   Lt ER Shoulder MMT Decreased  4+   Rt Low Trap MMT Decreased     Lt Low Trap MMT Decreased     Rt Mid Trap MMT Decreased     Lt Mid Trap MMT Decreased     Rt SA MMT Decreased     Lt SA MMT Decreased          Assessment  ROM and strength improving.  Pt able to complete more ADL with less difficulty.    Goals (to be met in 10 visits)     LYMPHEDEMA GOALS Not Met Progressing Toward Partially Met Met   Improve chest tissue/scar mobility to WFL to allow patient to reach overhead without pain           Pt to be independent with self MLD, ROM exercises, and donning/doffing compression bandages/garments to facilitate swelling reduction and  to be independent at self-management once discharged.           Increase B shoulder AROM to flex 165, abd 170, ER 90, IR to T9 to improve ease of dressing/bathing/and reaching overhead.    x       Increase B UE strength to at least 4/5 to improve ease of lifting and performing household chores.        x   Arm volume measurements to be completed to have baseline measurements for early lymphedema identification                                Plan  Cont as per plan. Progress ROM exercises as tolerate.    Treatment Last 4 Visits  Treatment Day: 7       6/5/2025 6/10/2025 6/12/2025 6/17/2025   PT Treatment   Insurance Auth # and Certification Dates Certification From: 5/28/2025  To:8/26/2025 Certification From: 5/28/2025  To:8/26/2025 Certification From: 5/28/2025  To:8/26/2025 Certification From: 5/28/2025  To:8/26/2025   # of Visits Used/Approved 4/10 5/10 6/10 7/10   Therapeutic Exercise Supine R shld AAROM    Sidely shld horiz abd/add x 5 B  Sidely windmills CW/CCW x 10 each B  Supine lower trunk rotation x 5   Wall wash CW/CCW in flex x 10 each B Doorway pect stretches x 5  Doorway shld flex slides x 5 MEASUREMENTS TAKEN today: B shoulder AROM/MMT    Doorway pect stretches x 5  Doorway shld flex slides x 5  Wall washes x 10 CW/CCW BUE   Manual Therapy STM R deltoid, pect, and areas of cording (axilla and abd), STM abd    MLD strokes to R trunk, axilla, upper arm      Compression:  - pt wearing new sports bra: good compression except has \"cups\", discussed getting soft bra inserts STM B pect, chest, axilla    Initiated gentle scar and soft tissue massage B chest w/ Eucerine. Encouraged pt to do gentle self massage. Instr pt she no longer needs xerform and non-adherant dressings, cont w/ abd pads for comfort    Compression:  - pt wearing ace wrap and compression bra. Fabricated foam compression pads for B pect tendons.   STM B pect, chest, axilla    Manual stretching R abd/cording    Gentle scar and soft tissue massage  B chest w/ Eucerine.     MLD strokes to R trunk, axilla, upper arm      Compression:  - Discussed weaning from ACE wrap. Pt wearing compression bra, inserted pt's foam prosthesis- provides good compression of chest/trunk. Discussed to also try wearing her smaller sized compression bra (wear only if not painful). Discussed if unable to tolerate wearing foam prosthesis to remove and put ACE back on as the foam prosthesis does provide compression of chest   STM B pect, chest, axilla    Manual stretching R abd/cording    Gentle scar and soft tissue massage B chest w/ Eucerine.     MLD strokes to R trunk, axilla, upper arm   Therapeutic Exercise Min 20 5 5 23   Manual Therapy Min 25 40 30 32   Total of Timed Procedures 45 45 35 55   Total of Service Based 0 0 0 0   Total Treatment Time 45 45 35 55        HEP       Charges     MM2, THereEx2

## 2025-06-20 ENCOUNTER — OFFICE VISIT (OUTPATIENT)
Dept: PHYSICAL THERAPY | Facility: HOSPITAL | Age: 45
End: 2025-06-20
Payer: COMMERCIAL

## 2025-06-20 PROCEDURE — 97140 MANUAL THERAPY 1/> REGIONS: CPT

## 2025-06-20 PROCEDURE — 97530 THERAPEUTIC ACTIVITIES: CPT

## 2025-06-20 NOTE — PROGRESS NOTES
Patient: Harper Ham (44 year old, female) Referring Provider:  Insurance:   Diagnosis: Invasive lobular carcinoma of breast in female (HCC) (C50.919)  Absence of both breasts (Z90.13) Suad Johnson  [employer] [place]   Date of Surgery: 5/12/2025  N/A   Precautions:  Cancer  Referral Information:    Date of Evaluation: Req: 0, Auth: 0, Exp:     05/28/25 POC Auth Visits:          Today's Date   6/20/2025    Subjective  Pt continues to feel better.  Still having pain in R lateral trunk area of cording.       Pain: 2/10     Objective  Cording present in R trunk near drain scar       Assessment  ROM and strength improving.  Pt able to complete more ADL with less difficulty.    Goals (to be met in 10 visits)     LYMPHEDEMA GOALS Not Met Progressing Toward Partially Met Met   Improve chest tissue/scar mobility to WFL to allow patient to reach overhead without pain           Pt to be independent with self MLD, ROM exercises, and donning/doffing compression bandages/garments to facilitate swelling reduction and to be independent at self-management once discharged.           Increase B shoulder AROM to flex 165, abd 170, ER 90, IR to T9 to improve ease of dressing/bathing/and reaching overhead.    x       Increase B UE strength to at least 4/5 to improve ease of lifting and performing household chores.        x   Arm volume measurements to be completed to have baseline measurements for early lymphedema identification                                    Plan  Cont as per plan. Progress ROM exercises as tolerate.    Treatment Last 4 Visits  Treatment Day: 8       6/10/2025 6/12/2025 6/17/2025 6/20/2025   PT Treatment   Insurance Auth # and Certification Dates Certification From: 5/28/2025  To:8/26/2025 Certification From: 5/28/2025  To:8/26/2025 Certification From: 5/28/2025  To:8/26/2025 Certification From: 5/28/2025  To:8/26/2025   # of Visits Used/Approved 5/10 6/10 7/10 8/10   Therapeutic Exercise Wall wash CW/CCW in flex x 10  each B Doorway pect stretches x 5  Doorway shld flex slides x 5 MEASUREMENTS TAKEN today: B shoulder AROM/MMT    Doorway pect stretches x 5  Doorway shld flex slides x 5  Wall washes x 10 CW/CCW BUE    Manual Therapy STM B pect, chest, axilla    Initiated gentle scar and soft tissue massage B chest w/ Eucerine. Encouraged pt to do gentle self massage. Instr pt she no longer needs xerform and non-adherant dressings, cont w/ abd pads for comfort    Compression:  - pt wearing ace wrap and compression bra. Fabricated foam compression pads for B pect tendons.   STM B pect, chest, axilla    Manual stretching R abd/cording    Gentle scar and soft tissue massage B chest w/ Eucerine.     MLD strokes to R trunk, axilla, upper arm      Compression:  - Discussed weaning from ACE wrap. Pt wearing compression bra, inserted pt's foam prosthesis- provides good compression of chest/trunk. Discussed to also try wearing her smaller sized compression bra (wear only if not painful). Discussed if unable to tolerate wearing foam prosthesis to remove and put ACE back on as the foam prosthesis does provide compression of chest   STM B pect, chest, axilla    Manual stretching R abd/cording    Gentle scar and soft tissue massage B chest w/ Eucerine.     MLD strokes to R trunk, axilla, upper arm STM B pect, chest, axilla    Manual stretching R abd/cording    Gentle scar and soft tissue massage B chest w/ Eucerine.     MLD strokes to R trunk, axilla, upper arm   Therapeutic Activity    Discussed importance of actual exercises and Stretching vs day to day house chores etc.  It is important to do the stretches to get to the end range of her motion.   Therapeutic Exercise Min 5 5 23    Manual Therapy Min 40 30 32 35   Therapeutic Activity Min    15   Total of Timed Procedures 45 35 55 50   Total of Service Based 0 0 0 0   Total Treatment Time 45 35 55 50        HEP       Charges     MM2, there Act1

## 2025-06-27 ENCOUNTER — OFFICE VISIT (OUTPATIENT)
Dept: SURGERY | Facility: CLINIC | Age: 45
End: 2025-06-27
Payer: COMMERCIAL

## 2025-06-27 DIAGNOSIS — Z90.13 ABSENCE OF BOTH BREASTS: Primary | ICD-10-CM

## 2025-06-27 NOTE — PROGRESS NOTES
Harper Ham is a 44 year old female who presents today in follow-up after undergoing bilateral mastectomy.  Immediate reconstruction was not performed based on malperfusion on SPY imaging.    Physical Examination:  Breasts: Bilateral breast incisions are well-healed.  The skin envelope has softened significantly.    Assessment and Plan:  Patient is doing well.  We discussed the plan for delayed reconstruction with tissue expander and acellular dermal matrix. The risks of surgery including but not limited to bleeding, infection, scarring, delayed wound healing, seroma, asymmetry, implant infection/extrusion requiring removal, injury to adjacent structures, capsular contracture, ALCL, breast implant associated illness, need for implant exchange, and need for further surgery were reviewed. The expected post-operative course was discussed including the need for activity limitation, drains, and suture removal.      Multiple questions were answered to the patient's satisfaction. No guarantees as to outcome were offered. The patient expresses understanding and wishes to proceed with delayed tissue expander reconstruction. A total of 20 minutes was spent reviewing the patient's history and diagnostic testing, examining the patient, reviewing reconstructive options, and coordinating the patient's care.

## 2025-06-27 NOTE — PATIENT INSTRUCTIONS
Surgeon:         Dr. Dejan Meyers                                        Tel:         [phone]                                  Fax:        [phone]    Surgery/Procedure: Delayed breast reconstruction with placement of bilateral breast tissue expanders and acellular dermal matrix. 1.5 hours, general anesthesia, outpatient.       Dx Code: Z90.13    Hospital:  [facility]: [address] [place] [id number]           [phone]  Surgery Date:  11/12/2025    1. Someone will need to drive you to and from the hospital if your procedure is outpatient.    2.Do not drink alcohol or smoke 24 hours prior to your procedure.    3. Bring a picture ID and your insurance card.    4. You will be contacted by the hospital the day before to confirm the procedure time and location.     5. Do not take any herbal supplements or blood thinners at least one week before your procedure/surgery. This includes NSAID's (aspirin, baby aspirin, Motrin, Ibuprofen, Aleve, Advil, Naproxen, etc), Fish oil, vitamin E, turmeric, CoQ10, or green tea supplements, etc. *TYLENOL or acetaminophen is ok to take*    6. PRE-OPERATIVE TESTING: History and physical with medical clearance is REQUIRED within 30 days of the surgery date and is mandatory per Dr. Meyers. *If this is not done, your surgery will be postponed. To avoid delays with your clearance, please ensure your PCP appointment is at least 14 days prior to your surgical date.*  MEDICAL CLEARANCE WITH DR. Julian  CBC  CMP  EKG (within 90 days)    7. If you take Coumadin, Plavix, Xarelto, or Eliquis, please contact your prescribing physician for special instructions on how long to hold. If you take insulin, contact your primary care physician for special instructions.     8. Please inform us if you start or change any medications at least one week before surgery (ex: blood thinners, weight loss medications, diabetic medications, herbal supplements, etc)    9. Does patient have  diagnosis of sleep apnea?    [   ] Yes     [ X ]  No    Consent obtained  Photos taken on 6/27/2025

## 2025-07-10 ENCOUNTER — TELEPHONE (OUTPATIENT)
Dept: PHYSICAL THERAPY | Facility: HOSPITAL | Age: 45
End: 2025-07-10

## 2025-07-10 ENCOUNTER — APPOINTMENT (OUTPATIENT)
Facility: LOCATION | Age: 45
End: 2025-07-10
Attending: INTERNAL MEDICINE

## 2025-07-11 ENCOUNTER — NURSE ONLY (OUTPATIENT)
Age: 45
End: 2025-07-11
Attending: INTERNAL MEDICINE
Payer: COMMERCIAL

## 2025-07-11 ENCOUNTER — OFFICE VISIT (OUTPATIENT)
Dept: PHYSICAL THERAPY | Facility: HOSPITAL | Age: 45
End: 2025-07-11
Attending: SURGERY
Payer: COMMERCIAL

## 2025-07-11 DIAGNOSIS — C50.411 MALIGNANT NEOPLASM OF UPPER-OUTER QUADRANT OF RIGHT BREAST IN FEMALE, ESTROGEN RECEPTOR POSITIVE (HCC): Primary | ICD-10-CM

## 2025-07-11 DIAGNOSIS — E28.39 SUPPRESSION OF OVARIAN SECRETION: ICD-10-CM

## 2025-07-11 DIAGNOSIS — Z17.0 MALIGNANT NEOPLASM OF UPPER-OUTER QUADRANT OF RIGHT BREAST IN FEMALE, ESTROGEN RECEPTOR POSITIVE (HCC): Primary | ICD-10-CM

## 2025-07-11 PROCEDURE — 97140 MANUAL THERAPY 1/> REGIONS: CPT | Performed by: PHYSICAL THERAPIST

## 2025-07-11 NOTE — PROGRESS NOTES
Patient: Harper Ham (44 year old, female) Referring Provider:  Insurance:   Diagnosis: Invasive lobular carcinoma of breast in female (HCC) (C50.919)  Absence of both breasts (Z90.13) Suad Johnson  [employer]   Date of Surgery: 5/12/2025  N/A   Precautions:  Cancer  Referral Information:    Date of Evaluation: Req: 0, Auth: 0, Exp:     05/28/25 POC Auth Visits:          Today's Date   7/11/2025    Subjective  \"I feel so much better\"       Pain: 0/10 (No pain, just tightness, soreness every once in a while)     Objective      Shoulder       6/2/2025 6/17/2025 7/11/2025   Shoulder ROM/MMT   Rt Flexion Shoulder 130 140 165   Lt Flexion Shoulder 135 155 165   Rt Flexion Shoulder MMT  4+ 4+   Lt Flexion Shoulder MMT  4+ 4+   Rt Extension Shoulder 40 50 55   Lt Extension Shoulder 45 55 55   Rt Extension Shoulder MMT   4+   Rt Extension Shoulder MMT   4+   Rt Abduction Shoulder 110 140 170   Lt Abduction Shoulder 115 155 170   Rt Abduction Shoulder MMT (C5)  4+ 4+   Lt Abduction Shoulder MMT (C5)  4+ 4+   Rt IR Shoulder T9 T7 T7   Lt IR Shoulder T7 T7 T7   Rt IR Shoulder MMT  4+ 5   Lt IR Shoulder MMT  4+ 5   Rt ER Shoulder 80 85 90   Lt ER Shoulder 90 90 90   Rt ER Shoulder MMT  4+ 5   Lt ER Shoulder MMT  4+ 5   Rt Low Trap MMT   4   Lt Low Trap MMT   4   Rt Mid Trap MMT   4+   Lt Mid Trap MMT   4+   Rt SA MMT   4+   Lt SA MMT   4+     Edema/Tissue       5/28/2025 7/11/2025   Edema/Tissue Observations   RUE Locations  Right Axilla;Right Upper Arm       Deferred arm volume measurements due to pt having limited ROM and significant pain Right Upper Arm   Edema/Tissue Observations: Rt Axilla swelling    Edema/Tissue Observations: Right upper arm swelling swelling       mild swelling   LUE Locations  --        Deferred arm volume measurements due to pt having limited ROM and significant pain    Trunk Locations --        Steri-strips w/ xerform gauze over intact chest incisions, no bleeding or drainage noted. Pt wearing  compression ACE wrap around chest w/ post-op bra. No noted swelling at this time. Decreased scar and chest tissue mobility. --        no noted trunk swelling         7/11/2025     1:00 PM   Lymphedema Calculations   DATE MEASURED 7/11/2025   LOCATION/MEASUREMENTS RUE   PATIENT POSITION  supine   LIMB POSITION 75 degress abd   STARTING POINT 18.5   RIGHT HAND VOLUME 19.6 cm across palm   LEFT HAND VOLUME 19.2 cm across palm   MEASUREMENT A 15.7   MEASUREMENT B 16.4   MEASUREMENT C 19.6   MEASUREMENT D 22.3   MEASUREMENT E 24   MEASUREMENT F 25.4   MEASUREMENT G 26.1   MEASUREMENT H 27.2   MEASUREMENT I 29   MEASUREMENT J 31.5    TOTAL VOLUME  2087.06031   DATE MEASURED 7/11/2025   LOCATION/MEASUREMENTS LUE   MEASUREMENT A 15.6   MEASUREMENT B 16.4   MEASUREMENT C 19.1   MEASUREMENT D 21.8   MEASUREMENT E 23.8   MEASUREMENT F 25.6   MEASUREMENT G 25.9   MEASUREMENT H 26.8   MEASUREMENT I 28.5   MEASUREMENT J 31    TOTAL VOLUME  2040.27441   % DIFFERENCE 2.58651            Assessment  improved ROM and strength, no noted swelling    Goals (to be met in 10 visits)      LYMPHEDEMA GOALS Not Met Progressing Toward Partially Met Met   Improve chest tissue/scar mobility to WFL to allow patient to reach overhead without pain       Pt to be independent with self MLD, ROM exercises, and donning/doffing compression bandages/garments to facilitate swelling reduction and to be independent at self-management once discharged.       Increase B shoulder AROM to flex 165, abd 170, ER 90, IR to T9 to improve ease of dressing/bathing/and reaching overhead.       Increase B UE strength to at least 4/5 to improve ease of lifting and performing household chores.       Arm volume measurements to be completed to have baseline measurements for early lymphedema identification                                          Plan  Pt to attempt self management x 4-6 weeks, then will return to re-assess and for pre-sx exercise instr    Treatment Last 4  Visits  Treatment Day: 9       6/12/2025 6/17/2025 6/20/2025 7/11/2025   PT Treatment   Insurance Auth # and Certification Dates Certification From: 5/28/2025  To:8/26/2025 Certification From: 5/28/2025  To:8/26/2025 Certification From: 5/28/2025  To:8/26/2025 Certification From: 5/28/2025  To:8/26/2025   # of Visits Used/Approved 6/10 7/10 8/10 9/10   Therapeutic Exercise Doorway pect stretches x 5  Doorway shld flex slides x 5 MEASUREMENTS TAKEN today: B shoulder AROM/MMT    Doorway pect stretches x 5  Doorway shld flex slides x 5  Wall washes x 10 CW/CCW BUE     Manual Therapy STM B pect, chest, axilla    Manual stretching R abd/cording    Gentle scar and soft tissue massage B chest w/ Eucerine.     MLD strokes to R trunk, axilla, upper arm      Compression:  - Discussed weaning from ACE wrap. Pt wearing compression bra, inserted pt's foam prosthesis- provides good compression of chest/trunk. Discussed to also try wearing her smaller sized compression bra (wear only if not painful). Discussed if unable to tolerate wearing foam prosthesis to remove and put ACE back on as the foam prosthesis does provide compression of chest   STM B pect, chest, axilla    Manual stretching R abd/cording    Gentle scar and soft tissue massage B chest w/ Eucerine.     MLD strokes to R trunk, axilla, upper arm STM B pect, chest, axilla    Manual stretching R abd/cording    Gentle scar and soft tissue massage B chest w/ Eucerine.     MLD strokes to R trunk, axilla, upper arm Re-assess ROM, strength, arm volume    STM B chest    MLD: BUE/trunk, instr given on self MLD     Therapeutic Activity   Discussed importance of actual exercises and Stretching vs day to day house chores etc.  It is important to do the stretches to get to the end range of her motion.    Therapeutic Exercise Min 5 23     Manual Therapy Min 30 32 35 55   Therapeutic Activity Min   15    Total of Timed Procedures 35 55 50 55   Total of Service Based 0 0 0 0   Total  Treatment Time 35 55 50 55        HEP       Charges     mm4

## 2025-07-23 ENCOUNTER — OFFICE VISIT (OUTPATIENT)
Facility: LOCATION | Age: 45
End: 2025-07-23
Attending: INTERNAL MEDICINE
Payer: COMMERCIAL

## 2025-07-23 ENCOUNTER — NURSE ONLY (OUTPATIENT)
Facility: LOCATION | Age: 45
End: 2025-07-23
Attending: INTERNAL MEDICINE
Payer: COMMERCIAL

## 2025-07-23 VITALS
HEART RATE: 75 BPM | SYSTOLIC BLOOD PRESSURE: 114 MMHG | DIASTOLIC BLOOD PRESSURE: 70 MMHG | RESPIRATION RATE: 16 BRPM | OXYGEN SATURATION: 97 %

## 2025-07-23 DIAGNOSIS — E28.39 SUPPRESSION OF OVARIAN SECRETION: ICD-10-CM

## 2025-07-23 DIAGNOSIS — N95.9 PREMENOPAUSAL PATIENT: ICD-10-CM

## 2025-07-23 DIAGNOSIS — Z17.0 MALIGNANT NEOPLASM OF UPPER-OUTER QUADRANT OF RIGHT BREAST IN FEMALE, ESTROGEN RECEPTOR POSITIVE (HCC): Primary | ICD-10-CM

## 2025-07-23 DIAGNOSIS — Z90.13 STATUS POST BILATERAL MASTECTOMY: ICD-10-CM

## 2025-07-23 DIAGNOSIS — Z51.81 ENCOUNTER FOR MONITORING LUPRON THERAPY: ICD-10-CM

## 2025-07-23 DIAGNOSIS — C50.411 MALIGNANT NEOPLASM OF UPPER-OUTER QUADRANT OF RIGHT BREAST IN FEMALE, ESTROGEN RECEPTOR POSITIVE (HCC): Primary | ICD-10-CM

## 2025-07-23 DIAGNOSIS — Z79.818 ENCOUNTER FOR MONITORING LUPRON THERAPY: ICD-10-CM

## 2025-07-23 DIAGNOSIS — F19.94 DRUG-INDUCED MOOD DISORDER (HCC): ICD-10-CM

## 2025-07-23 DIAGNOSIS — Z91.89 AT RISK FOR OSTEOPENIA: ICD-10-CM

## 2025-07-23 LAB
ESTRADIOL SERPL-MCNC: 26.6 PG/ML
FSH SERPL-ACNC: 4.7 MIU/ML
LH SERPL-ACNC: 0.5 MIU/ML

## 2025-07-23 NOTE — PROGRESS NOTES
[facility] Hematology Oncology  Follow up Note    Patient Name: Harper Ham   YOB: 1980   Medical Record Number: [id number]   CSN: [id number]   Attending Physician: Esha Wise MD     Harper Ham verbally consented to be recorded using ambient AI listening technology and understand that they can each withdraw their consent to this listening technology at any point by asking the clinician to turn off or pause the recording.      Date of Visit: 7/23/2025     Chief Complaint:  Breast cancer     Oncologic History:    Harper Stanley a 44 year old White female with no significant medical history referred for evaluation of newly diagnosed breast cancer. Her oncologic history is as follows:     2/2025: patient self palpated a kenyetta in there right upper outer breast at the end of February.      3/18/25: bilateral diagnostic mammogram and right ultrasound revealed a 1.6 cm suspicious mass at 9:30 position, 5 cm from nipple, and a smaller adjacent 0.7 cm mass at 9:00, 6 cm from nipple, presumably similar pathology. No enlarged or abnormal lymph nodes in the right axilla.       3/26/25: MRI breast    3/28/25: US guided biopsy of R breast mass 9:00, 6 cm from nipple showed invasive lobular carcinoma, grade 2, ER 98%, MS 99%, HER2 2+ IHC, FISH pending, Ki-67 4%.   9:30 mass, 5 cm from nipple: invasive ductal carcinoma, grade 1, ER 90%, MS 99%, HER2 2+ IHC, FISH pending, Ki-67 4%.      4/1/25: 18F-FES PET/CT Mildly hypermetabolic lesion in the outer right breast with adjacent biopsy clip. No axillary or internal mammary hypermetabolic nodes. No distant disease     5/12/25: bilateral nipple sparing mastectomies with right axillary sentinel node biopsy and skin closure (Dr. Chapin/Dr. Meyers): 2 foci of invasive lobular and ductal carcinoma, grade II, 2.1 cm and 1.0 cm, ALH. all margins were negative, 0/1 lymph node positive. pT2(m)N0    Immediate reconstruction was aborted due to mastectomy skin flap  hypoperfusion right greater than left- attributed to smoking history.     6/13/25: initiated lupron 3.75 mg monthly      Interval History:  History of Present Illness  Harper Ham is a 44 year old female with breast cancer undergoing ovarian suppression therapy who presents for follow up and management of menopausal symptoms.    She began ovarian suppression therapy in mid June, receiving her second shot on July 11. Since starting therapy, she has experienced amenorrhea.    She experiences frequent hot flashes characterized by intense sweating, exacerbated by physical activity and warm environments. She manages these by adjusting the thermostat at work and using manual fans.    She reports significant mood changes, including increased irritability and episodes of rage, which are new for her. To manage these symptoms, she has resumed cannabis use, which she finds stabilizes her mood.    She experiences joint stiffness, particularly in her hands, ankles, knees, and hips, which she attributes to the treatment. She performs physical therapy exercises at home to alleviate stiffness, focusing on her shoulders and elbows.    She has not noticed significant hair thinning or hair fall beyond what she experiences due to her thyroid condition, and she uses thickening shampoo and conditioner.    She is concerned about osteoporosis risk, especially given her mother's recent diagnosis at age 69. She is aware of her clumsiness and the potential for increased fracture risk over time.    No significant night sweats, as she regulates her sleeping environment with organic sheets and a fan. She wakes up a couple of times at night but returns to sleep easily. No depressive symptoms, but increased irritability and mood changes are present.       Performance Status: ECOG 0    Current Medications:  Medications - Current[1]       Review of Systems:  10 -point systems reviewed, all negative except as mentioned in the HPI/interval history.      Vital Signs:  /70 (BP Location: Right arm, Patient Position: Sitting, Cuff Size: adult)   Pulse 75   Resp 16   LMP 04/18/2025 (Exact Date)   SpO2 97%     Physical Examination:  General: Aert and oriented x 3, not in acute distress.  Psych:  Mood and affect appropriate  HEENT: Non-icteric sclera. Oropharynx is clear.   Neck: No palpable lymphadenopathy. Neck is supple.  Breast: S/p bilateral nipple sparing mastectomies.   Chest: Clear to auscultation.  Heart: Regular rate and rhythm.  Abdomen: Soft, non tender.   Extremities: No peripheral edema.  Neurological: Grossly intact.     Laboratory:  Lab Results   Component Value Date    WBC 7.4 04/16/2025    RBC 4.43 04/16/2025    HGB 13.0 04/16/2025    HCT 39.8 04/16/2025    MCV 89.8 04/16/2025    MCH 29.3 04/16/2025    MCHC 32.7 04/16/2025    RDW 12.3 04/16/2025    .0 04/16/2025    MPV 9.2 12/17/2018     Lab Results   Component Value Date     04/18/2025    K 4.5 04/18/2025     04/18/2025    CO2 25.0 04/18/2025    BUN 10 04/18/2025    CREATSERUM 0.70 04/18/2025    GLU 91 04/18/2025    CA 8.9 04/18/2025    ALKPHO 53 04/18/2025    ALT 16 04/18/2025    AST 17 04/18/2025    BILT 0.4 04/18/2025    ALB 4.5 04/18/2025    TP 7.2 04/18/2025     Lab Results   Component Value Date/Time    ESTRA 26.6 07/23/2025 02:40 PM       Impression and Plan:  Assessment & Plan  Right breast multifocal invasive mixed ductal and lobular carcinoma, grade II, ER/ME positive, HER2 negative, Ki-67 4%, stage IIA  S/p bilateral mastectomies and right sentinel node biopsy 5/12/25 (Dr. Chapin): 2 foci of IDC, grade II, 2.1 cm and 1.0 cm, ALH. all margins were negative, 0/1 lymph node positive. pT2(m)N0  Oncotype Dx recurrence score 22, indicating 8% risk of distant recurrence with endocrine therapy and no chemotherapy benefit  Given her age and recurrence score, ovarian function suppression (OFS) + aromatase inhibitor (AI) was recommended for greater risk reduction, based  on evidence from the SOFT and TEXT trials.  - Started lupron 3.75 mg 6/13/25- continue monthly dosing   - Start anastrozole after the third shot  - Check estradiol level next month  - Consider tamoxifen if anastrozole is not tolerated    Drug-induced menopausal symptoms  Experiencing menopausal symptoms due to ovarian suppression, including hot flashes, night sweats, and joint stiffness. Symptoms are expected and may improve over time but are more intense in the summer and initial months of treatment. Anastrozole may exacerbate symptoms.  - Consider supportive medications for hot flashes if symptoms worsen    Irritability due to treatment  Significant irritability and mood changes attributed to hormonal changes from ovarian suppression, impacting interactions with family. Cannabis use has been helpful in managing irritability.  - Refer to [facility] for therapy  - Consider medications if mood changes persist    Risk of osteoporosis  Concerned about osteoporosis risk due to early menopause induced by treatment and family history of osteoporosis.  - Order DEXA scan to assess baseline bone density  - Discuss treatment options if osteopenia is detected       56 minutes spent on this encounter, more than 50% of that time was spent counseling the patient and/or on coordination of care.  The diagnosis, prognosis, and general treatment was explained to the patient. All questions answered to her satisfaction.       Esha Wise MD   [facility] Hematology Oncology            [1]   Current Outpatient Medications:     goserelin acetate 3.6 MG Subcutaneous Implant, Inject 3.6 mg into the skin every 30 (thirty) days., Disp: , Rfl:     ZINC OR, Take by mouth daily., Disp: , Rfl:     vitamin B-12 50 MCG Oral Tab, Take 1 tablet (50 mcg total) by mouth daily., Disp: , Rfl:     KRILL OIL OR, Take by mouth., Disp: , Rfl:     Ascorbic Acid (VITAMIN C OR), Take 1,000 mg by mouth daily., Disp: , Rfl:     Ergocalciferol (VITAMIN D OR),  Take 2,000 Int'l Units by mouth daily., Disp: , Rfl:

## 2025-08-07 ENCOUNTER — APPOINTMENT (OUTPATIENT)
Facility: LOCATION | Age: 45
End: 2025-08-07
Attending: INTERNAL MEDICINE
Payer: COMMERCIAL

## 2025-08-08 ENCOUNTER — NURSE ONLY (OUTPATIENT)
Facility: LOCATION | Age: 45
End: 2025-08-08
Attending: FAMILY MEDICINE

## 2025-08-08 DIAGNOSIS — E28.39 SUPPRESSION OF OVARIAN SECRETION: ICD-10-CM

## 2025-08-08 DIAGNOSIS — Z17.0 MALIGNANT NEOPLASM OF UPPER-OUTER QUADRANT OF RIGHT BREAST IN FEMALE, ESTROGEN RECEPTOR POSITIVE (HCC): Primary | ICD-10-CM

## 2025-08-08 DIAGNOSIS — C50.411 MALIGNANT NEOPLASM OF UPPER-OUTER QUADRANT OF RIGHT BREAST IN FEMALE, ESTROGEN RECEPTOR POSITIVE (HCC): Primary | ICD-10-CM

## 2025-08-10 ENCOUNTER — TELEPHONE (OUTPATIENT)
Age: 45
End: 2025-08-10

## 2025-08-20 ENCOUNTER — LAB ENCOUNTER (OUTPATIENT)
Dept: LAB | Facility: HOSPITAL | Age: 45
End: 2025-08-20
Attending: INTERNAL MEDICINE

## 2025-08-20 ENCOUNTER — OFFICE VISIT (OUTPATIENT)
Dept: PHYSICAL THERAPY | Facility: HOSPITAL | Age: 45
End: 2025-08-20
Attending: SURGERY

## 2025-08-20 DIAGNOSIS — E28.39 SUPPRESSION OF OVARIAN SECRETION: ICD-10-CM

## 2025-08-20 LAB — ESTRADIOL SERPL-MCNC: 16 PG/ML

## 2025-08-20 PROCEDURE — 97140 MANUAL THERAPY 1/> REGIONS: CPT | Performed by: PHYSICAL THERAPIST

## 2025-08-20 PROCEDURE — 82670 ASSAY OF TOTAL ESTRADIOL: CPT

## 2025-08-20 PROCEDURE — 36415 COLL VENOUS BLD VENIPUNCTURE: CPT

## 2025-08-22 ENCOUNTER — OFFICE VISIT (OUTPATIENT)
Dept: PHYSICAL THERAPY | Facility: HOSPITAL | Age: 45
End: 2025-08-22
Attending: SURGERY

## 2025-08-22 DIAGNOSIS — I89.0 LYMPHEDEMA: Primary | ICD-10-CM

## 2025-08-22 PROCEDURE — 97530 THERAPEUTIC ACTIVITIES: CPT | Performed by: PHYSICAL THERAPIST

## 2025-08-22 PROCEDURE — 97140 MANUAL THERAPY 1/> REGIONS: CPT | Performed by: PHYSICAL THERAPIST

## 2025-08-26 ENCOUNTER — OFFICE VISIT (OUTPATIENT)
Facility: LOCATION | Age: 45
End: 2025-08-26
Attending: INTERNAL MEDICINE

## 2025-08-26 VITALS
TEMPERATURE: 98 F | WEIGHT: 158 LBS | SYSTOLIC BLOOD PRESSURE: 110 MMHG | HEIGHT: 68 IN | OXYGEN SATURATION: 99 % | BODY MASS INDEX: 23.95 KG/M2 | HEART RATE: 68 BPM | DIASTOLIC BLOOD PRESSURE: 77 MMHG | RESPIRATION RATE: 16 BRPM

## 2025-08-26 DIAGNOSIS — Z51.81 ENCOUNTER FOR MONITORING LUPRON THERAPY: ICD-10-CM

## 2025-08-26 DIAGNOSIS — Z79.818 ENCOUNTER FOR MONITORING LUPRON THERAPY: ICD-10-CM

## 2025-08-26 DIAGNOSIS — E28.39 SUPPRESSION OF OVARIAN SECRETION: ICD-10-CM

## 2025-08-26 DIAGNOSIS — C80.1 ANXIETY ASSOCIATED WITH CANCER DIAGNOSIS (HCC): ICD-10-CM

## 2025-08-26 DIAGNOSIS — Z90.13 STATUS POST BILATERAL MASTECTOMY: ICD-10-CM

## 2025-08-26 DIAGNOSIS — Z17.0 MALIGNANT NEOPLASM OF UPPER-OUTER QUADRANT OF RIGHT BREAST IN FEMALE, ESTROGEN RECEPTOR POSITIVE (HCC): Primary | ICD-10-CM

## 2025-08-26 DIAGNOSIS — N95.1 VASOMOTOR SYMPTOMS DUE TO MENOPAUSE: ICD-10-CM

## 2025-08-26 DIAGNOSIS — F41.1 ANXIETY ASSOCIATED WITH CANCER DIAGNOSIS (HCC): ICD-10-CM

## 2025-08-26 DIAGNOSIS — C50.911 INVASIVE LOBULAR CARCINOMA OF RIGHT BREAST IN FEMALE (HCC): ICD-10-CM

## 2025-08-26 DIAGNOSIS — C50.411 MALIGNANT NEOPLASM OF UPPER-OUTER QUADRANT OF RIGHT BREAST IN FEMALE, ESTROGEN RECEPTOR POSITIVE (HCC): Primary | ICD-10-CM

## 2025-08-26 RX ORDER — ANASTROZOLE 1 MG/1
1 TABLET ORAL DAILY
Qty: 30 TABLET | Refills: 2 | Status: SHIPPED | OUTPATIENT
Start: 2025-08-26

## 2025-08-27 ENCOUNTER — OFFICE VISIT (OUTPATIENT)
Dept: PHYSICAL THERAPY | Facility: HOSPITAL | Age: 45
End: 2025-08-27
Attending: SURGERY

## 2025-08-27 PROCEDURE — 97140 MANUAL THERAPY 1/> REGIONS: CPT | Performed by: PHYSICAL THERAPIST

## 2025-08-27 PROCEDURE — 97110 THERAPEUTIC EXERCISES: CPT | Performed by: PHYSICAL THERAPIST

## 2025-08-29 ENCOUNTER — APPOINTMENT (OUTPATIENT)
Dept: PHYSICAL THERAPY | Facility: HOSPITAL | Age: 45
End: 2025-08-29
Attending: SURGERY

## 2025-08-29 ENCOUNTER — TELEPHONE (OUTPATIENT)
Dept: PHYSICAL THERAPY | Facility: HOSPITAL | Age: 45
End: 2025-08-29

## (undated) DEVICE — SOLUTION IRRIG 1000ML 0.9% NACL USP BTL

## (undated) DEVICE — GLOVE SUR 6.5 SENSICARE PI PIP CRM PWD F

## (undated) DEVICE — 3M™ IOBAN™ 2 ANTIMICROBIAL INCISE DRAPE 6651EZ: Brand: IOBAN™ 2

## (undated) DEVICE — AEGIS 1" DISK 4MM HOLE, PEEL OPEN: Brand: MEDLINE

## (undated) DEVICE — SYRINGE MED 50ML LL TIP DISP

## (undated) DEVICE — STANDARD HYPODERMIC NEEDLE,POLYPROPYLENE HUB: Brand: MONOJECT

## (undated) DEVICE — SOL H2O 1000ML BTL

## (undated) DEVICE — EVACUATOR SUR 100CC SIL BLB WND

## (undated) DEVICE — SYRINGE MED 5ML STD CLR PLAS LL TIP N CTRL

## (undated) DEVICE — CABLE BPLR L12FT FLYING LD DISP

## (undated) DEVICE — SUT PDS II 2-0 27IN SH ABSRB VLT L26MM 1/2

## (undated) DEVICE — GLOVE SUR 7.5 SENSICARE PI PIP CRM PWD F

## (undated) DEVICE — BNDG,ELSTC,MATRIX,STRL,6"X5YD,LF,HOOK&LP: Brand: MEDLINE

## (undated) DEVICE — TROCAR: Brand: KII FIOS FIRST ENTRY

## (undated) DEVICE — SHEATH GUID RADAR LOC FOR BRST CA SUR SAVI

## (undated) DEVICE — SUT MCRYL 4-0 18IN PS-2 ABSRB UD 19MM 3/8 CIR

## (undated) DEVICE — TROCAR: Brand: KII SLEEVE

## (undated) DEVICE — GAUZE,SPONGE,FLUFF,6"X6.75",STRL,5/TRAY: Brand: MEDLINE

## (undated) DEVICE — 3M™ STERI-DRAPE™ INSTRUMENT POUCH 1018: Brand: STERI-DRAPE™

## (undated) DEVICE — DRAIN SUR 15FR L3/16IN DIA4.7MM SIL RND

## (undated) DEVICE — 3M™ STERI-STRIP™ REINFORCED ADHESIVE SKIN CLOSURES, R1548, 1 IN X 5 IN (25 MM X 125 MM), 4 STRIPS/ENVELOPE: Brand: 3M™ STERI-STRIP™

## (undated) DEVICE — GLOVE SUR 8 SENSICARE PI PIP CRM PWD F

## (undated) DEVICE — 12 ML SYRINGE LUER-LOCK TIP: Brand: MONOJECT

## (undated) DEVICE — #15 STERILE STAINLESS BLADE: Brand: STERILE STAINLESS BLADES

## (undated) DEVICE — 4-WAY HIGH FLOW STOPCOCK W/ROTATING LUER: Brand: ICU MEDICAL

## (undated) DEVICE — WECK HORIZON SMALL YELLOW CLIP DISP

## (undated) DEVICE — VIOLET BRAIDED (POLYGLACTIN 910), SYNTHETIC ABSORBABLE SUTURE: Brand: COATED VICRYL

## (undated) DEVICE — SUT PROL 2-0 30IN SH NABSRB BLU L26MM 1/2 CIR

## (undated) DEVICE — ENCORE® LATEX ACCLAIM SIZE 7, STERILE LATEX POWDER-FREE SURGICAL GLOVE: Brand: ENCORE

## (undated) DEVICE — SUT PERMA- 2-0 30IN SH NABSRB BLK L26MM 1/

## (undated) DEVICE — 6 ML SYRINGE LUER-LOCK TIP: Brand: MONOJECT

## (undated) DEVICE — COVER,LIGHT,CAMERA,HARD,1/PK,STRL: Brand: MEDLINE

## (undated) DEVICE — DRAPE TAPE: Brand: CONVERTORS

## (undated) DEVICE — ZZ-CONVERTED-TO-524825-ADHESIVE SKIN TOP FOR WND CLSR DERMBND ADV

## (undated) DEVICE — SLEEVE COMPR MD KNEE LEN SGL USE KENDALL SCD

## (undated) DEVICE — DRAPE,TOWEL,LARGE,INVISISHIELD: Brand: MEDLINE

## (undated) DEVICE — LAPAROTOMY SPONGE - RF AND X-RAY DETECTABLE PRE-WASHED: Brand: SITUATE

## (undated) DEVICE — VIAL LABORATORY SPY

## (undated) DEVICE — SOLUTION PREP 4OZ 10% POVIDONE IOD SCR TOP

## (undated) DEVICE — UNDYED BRAIDED (POLYGLACTIN 910), SYNTHETIC ABSORBABLE SUTURE: Brand: COATED VICRYL

## (undated) DEVICE — COVER PRB 5X96IN W/ GEL ULTRACOVER

## (undated) DEVICE — BREAST-HERNIA-PORT CDS-LF: Brand: MEDLINE INDUSTRIES, INC.

## (undated) DEVICE — INSULATED BLADE ELECTRODE: Brand: EDGE

## (undated) DEVICE — 60 ML SYRINGE LUER-LOCK TIP: Brand: MONOJECT

## (undated) DEVICE — LAPAROSCOPY: Brand: MEDLINE INDUSTRIES, INC.

## (undated) DEVICE — MARKER SKIN PREP-RESISTANT ST: Brand: MEDLINE INDUSTRIES, INC.

## (undated) DEVICE — SUTURE VICRYL 3-0 SH

## (undated) DEVICE — SOL  .9 1000ML BTL

## (undated) DEVICE — SYRINGE BULB 50/CS 48/PLT: Brand: MEDEGEN MEDICAL PRODUCTS, LLC

## (undated) DEVICE — RETRACTOR SUR WIDE FLAT LP LTWT PHOTONGUIDE

## (undated) DEVICE — SYRINGE MED 10ML LL TIP W/O SFTY DISP

## (undated) DEVICE — XEROFORM OCCLUSIVE GAUZE STRIP OVERWRAP, 3% BISMUTH TRIBROMOPHENATE IN PETROLATUM BLEND: Brand: XEROFORM

## (undated) DEVICE — LUBRICANT ELECTRD 4ML ANTISTICK SOL W/ FOAM

## (undated) DEVICE — WECK HORIZON MED BLUE CLIP DISP

## (undated) DEVICE — SUT MCRYL 4-0 27IN ABSRB UD 19MM PS-2 3/8

## (undated) DEVICE — ENCORE® LATEX MICRO SIZE 7.5, STERILE LATEX POWDER-FREE SURGICAL GLOVE: Brand: ENCORE

## (undated) DEVICE — MANIPULATOR CATH ESCP KRNR

## (undated) DEVICE — ANTIBACTERIAL UNDYED BRAIDED (POLYGLACTIN 910), SYNTHETIC ABSORBABLE SUTURE: Brand: COATED VICRYL

## (undated) DEVICE — LIGASURE LAP MARYLAND 37CM

## (undated) NOTE — LETTER
7/19/2021              Henry Hartley        [address] [facility]         Dear Ike Tolentino,   According to our records, you are due soon for your annual mammography screening.       Mammograms are the best method to detect breast cancer

## (undated) NOTE — LETTER
8/6/2020          To Whom It May Concern:    Devonte Holguin is currently under my medical care and may not return to work at this time. Please excuse Cassidy Scott for 2 days. She may return to work on Monday August 10th, 2020.   Activity is restricted as follo

## (undated) NOTE — ED AVS SNAPSHOT
[facility] Emergency Department    [address]     [place] [address]    Phone:  [phone]    Fax:  [phone]           Rosalba Alas   MRN: [id number]    Department:  [facility] Emergency Department   Date of Visit:  1/17/ URINARY TRACT INFECTIONS IN WOMEN (ENGLISH)      Disclosure     Insurance plans vary and the physician(s) referred by the ER may not be covered by your plan.  Please contact your insurance company to determine coverage and benefits available for follow-up If you have been prescribed any medication(s), please fill your prescription right away and begin taking the medication(s) as directed.   If you believe that any of the medications or instructions on this list is different from what your Primary Care doctor coverage. Patient [address] is a Federal Navigator program that can help with your Affordable Care Act coverage, as well as all types of Medicaid plans.   To get signed up and covered, please call [phone] and ask to get set up for an insuran

## (undated) NOTE — MR AVS SNAPSHOT
[facility] SPECIALTY [facility] [place] [address] [address] [address] [phone]               Thank you for choosing us for your health care visit with Nurse. We are glad to serve you and happy to provide you with this summary of your visit. information, go to [email or web address] and click on the Sign Up Now link in the [employer] box. Enter your CareToSave Activation Code exactly as it appears below along with your Zip Code and Date of Birth to complete the sign-up process.  If you do

## (undated) NOTE — ED AVS SNAPSHOT
[facility] Emergency Department    [address]     [place] [address]    Phone:  [phone]    Fax:  [phone]           Michael Amairani   MRN: [id number]    Department:  [facility] Emergency Department   Date of Visit:  1/17/ and Class Registration line at [phone] or find a doctor online by visiting [email or web address].    IF THERE IS ANY CHANGE OR WORSENING OF YOUR CONDITION, CALL YOUR PRIMARY CARE PHYSICIAN AT ONCE OR RETURN IMMEDIATELY TO [address] [place] [address].     If

## (undated) NOTE — LETTER
AUTHORIZATION FOR SURGICAL OPERATION OR OTHER PROCEDURE    1.  I hereby authorize Dr. Esther Branham, and [facility] staff assigned to my case to perform the following operation and/or procedure at the [facility]:    IUD removal ___________________ Patient signature:  ___________________________________________________             Relationship to Patient:           []  Parent    Responsible person                          []  Spouse  In case of minor or                    [] Other  _____________   In

## (undated) NOTE — ED AVS SNAPSHOT
[facility] AND [facility] in [facility] [address]  [address] [place] [facility]\""    Phone:  [phone]    Fax:  [phone]           Dipti Shanks   MRN: [id number]    Department:  [facility] in [address] [place] [address]   Date of Visit: deductible, co-payment, or co-insurance and for other services not covered under your health insurance plan. Please contact your insurance company and physician's office to determine coverage and benefits available for follow-up care and referrals.      It continue to take your medications as instructed by your Primary Care doctor until you can check with your doctor. Please bring the medication list to your next doctor's appointment.     Any imaging studies and labs completed today can be reviewed in your M and ask to get set up for an insurance coverage that is in-network with [facility]. [employer]     Sign up for [employer], your secure online medical record.   [employer] will allow you to access patient instructions from your recent visit,  view

## (undated) NOTE — LETTER
AUTHORIZATION FOR SURGICAL OPERATION OR OTHER PROCEDURE    1. I hereby authorize Dr. Cherie Dexter, and [address] [place] [address] staff assigned to my case to perform the following operation and/or procedure at the [address] [place] [address]:    ___________________________________acupuncture       2. My physician has explained the nature and purpose of the operation or other procedure, possible alternative methods of treatment, the risks involved, and the possibility of complication to me. I acknowledge that no guarantee has been made as to the result that may be obtained. 3.  I recognize that, during the course of this operation, or other procedure, unforseen conditions may necessitate additional or different procedure than those listed above. I, therefore, further authorize and request that the above named physician, his/her physician assistants or designees perform such procedures as are, in his/her professional opinion, necessary and desirable. 4.  Any tissue or organs removed in the operation or other procedure may be disposed of by and at the discretion of the [address] [place] [address] and [facility]. 5.  I understand that in the event of a medical emergency, I will be transported by local paramedics to [facility] or other hospital emergency department. 6.  I certify that I have read and fully understand the above consent to operation and/or other procedure. 7.  I acknowledge that my physician has explained sedation/analgesia administration to me including the risks and benefits. I consent to the administration of sedation/analgesia as may be necessary or desirable in the judgement of my physician. Witness signature: ___________________________________________________ Date:  ______/______/_____                    Time:  ________ A. M.  P.M.        Patient Name:  ______________________________________________________  (please print)      Patient signature: ___________________________________________________             Relationship to Patient:           []  Parent    Responsible person                          []  Spouse  In case of minor or                    [] Other  _____________   Incompetent name:  __________________________________________________                               (please print)      _____________      Responsible person  In case of minor or  Incompetent signature:  _______________________________________________    Statement of Physician  My signature below affirms that prior to the time of the procedure, I have explained to the patient and/or his/her guardian, the risks and benefits involved in the proposed treatment and any reasonable alternative to the proposed treatment. I have also explained the risks and benefits involved in the refusal of the proposed treatment and have answered the patient's questions.                         Date:  ______/______/_______  Provider                      Signature:  __________________________________________________________       Time:  ___________ A.M    P.M.

## (undated) NOTE — MR AVS SNAPSHOT
[facility]  [address] [place] [address] [phone]               Thank you for choosing us for your health care visit with Abhinav Kim MD.  We are glad to serve you and happy to provide you with this summary of your visit.   Ple Assoc Dx:  Hyperthyroidism [E05.90]           Free T3 (Triiodothryronine)    Complete by: Feb 02, 2017 (Approximate)    Assoc Dx:  Hyperthyroidism [E05.90]           Free T4, (Free Thyroxine)    Complete by:   Feb 02, 2017 (Approximate)    Assoc Dx:  Asha Mcdermott

## (undated) NOTE — LETTER
[address]  [facility]       08/07/20        Patient: Juan Jose Huddleston   YOB: 1980   Date of Visit: 8/7/2020       Dear   Sarah Phillips PA-C,      Thank you for referring Juan Jose Huddleston to my practice

## (undated) NOTE — MR AVS SNAPSHOT
After Visit Summary   6/18/2021    Stefany Chavez    MRN: [id number]           Visit Information     Date & Time  6/18/2021  8:50 AM Provider  Ryan Razo, Kaiser Blanco MD [address] [place] [address]  Dept.  Phone  [phone] CUSTOM]  6/18/2021 6/18/2022      Imaging Scheduling Instructions     Around June 18, 2021   Imaging:   INGRID SCREENING BILAT (AQX=[id number])    Instructions:  Your order will generate a \"Scheduling Ticket\" that will be available in [employer] to schedule on your EXPRESS CARE  Gwendolyn Reeves [address]  Monday – Friday  8:00 am – 8:00 pm   Saturday – Sunday  8:00 am – 4:00 pm    WALK-IN CARE  Primary Care Providers  Treatment for acute illness   or injury that are   non-life-threatening.   Also available b

## (undated) NOTE — LETTER
[facility] ULTRASOUND [facility] FOR [facility]  [address] [place] [address]  [phone]  [phone]  Authorization for Imaging Procedure  Date of Procedure:     I hereby authorize Dr. Rivera , my physician and his/her assistants (if applicable), which may include medical students, residents, and/or fellows, to perform the following procedure and administer such anesthesia as may be determined necessary by my physician: ULTRASOUND GUIDED RIGHT  BREAST BIOPSY TIMES   TWO 2 SITES WITH CLIP PLACEMENT TO EACH SITE RIGHT  on [place].   2.  I recognize that during the procedure, unforeseen conditions may necessitate additional or different procedures than those listed above. I, therefore, further authorize and request that the above-named physician, assistants, or designees perform such procedures as are, in their judgment, necessary and desirable.    3.  My physician has discussed prior to my procedure the potential benefits, risks and side effects of this procedure; the likelihood of achieving goals; and potential problems that might occur during recuperation. They also discussed reasonable alternatives to the procedure, including risks, benefits, and side effects related to the alternatives and risks related to not receiving this procedure. I have had all my questions answered and I acknowledge that no guarantee has been made as to the result that may be obtained.    4.  Should the need arise during my procedure, which includes change of level of care prior to discharge, I also consent to the administration of blood and/or blood products. Further, I understand that despite careful testing and screening of blood or blood products by collecting agencies, I may still be subject to ill effects as a result of receiving a blood transfusion and/or blood products. The following are some, but not all, of the potential risks that can occur: fever and allergic reactions, hemolytic reactions, transmission  of diseases such as Hepatitis, AIDS and Cytomegalovirus (CMV) and fluid overload. In the event that I wish to have an autologous transfusion of my own blood, or a directed donor transfusion, I will discuss this with my physician.  Check only if Refusing Blood or Blood Products  I understand refusal of blood or blood products as deemed necessary by my physician may have serious consequences to my condition to include possible death. I hereby assume responsibility for my refusal and release the hospital, its personnel, and my physicians from any responsibility for the consequences of my refusal.   [  ] Patient Refuses Blood      5.  I authorize the use of any specimen, organs, tissues, body parts or foreign objects that may be removed from my body during the procedure for diagnosis, research or teaching purposes and their subsequent disposal by hospital authorities. I also authorize the release of specimen test results and/or written reports to my treating physician on the hospital medical staff or other referring or consulting physicians involved in my care, at the discretion of the Pathologist or my treating physician.    6.  I consent to the photographing or videotaping of the procedures to be performed, including appropriate portions of my body for medical, scientific, or educational purposes, provided my identity is not revealed by the pictures or by descriptive texts accompanying them. If the procedure has been photographed/videotaped, the physician will obtain the original picture, image, videotape or CD. The hospital will not be responsible for storage, release or maintenance of the picture, image, tape or CD.   7.  I consent to the presence of a  or observers in the operating room as deemed necessary by my physician or their designees.    8.  I recognize that in the event my procedure results in extended X-Ray/fluoroscopy time, I may develop a skin reaction.    9.  If I have a Do Not Attempt  Resuscitation (DNAR) order in place, that status will be suspended while in the operating room, procedural suite, and during the recovery period unless otherwise explicitly stated by me (or a person authorized to consent on my behalf). The performing physician or my attending physician will determine when the applicable recovery period ends for purposes of reinstating the DNAR order.  10.  I acknowledge that my physician has explained sedation/analgesia administration to me including the risk and benefits I consent to the administration of sedation/analgesia as may be necessary or desirable in the judgment of my physician.      I CERTIFY THAT I HAVE READ AND FULLY UNDERSTAND THE ABOVE CONSENT FOR THE PROCEDURE.   Signature of Patient: _____________________________________________________________  Responsible person in case of minor, unconscious: ____________________________________  Relationship to patient:  __________________________________________________________  Signature of Witness: _______________________________Date: _________Time: __________    Statement of Physician: My signature below affirms that prior to the time of the procedure, I have explained to the patient and/or her guardian, the risks and benefits involved in the proposed treatment and any reasonable alternative to the proposed treatment. I have also explained the risks and benefits involved in the refusal of the proposed treatment and have answered the patient's questions. If I have a significant financial interest in a co-management agreement or a significant financial interest in any product or implant, or other significant relationship used in the procedure/surgery, I have disclosed this and had a discussion with my patient.  Signature of Physician:   _________________________________Date:_____________Time:________    Patient Name: Harper Ham : 10/11/1980  Printed: 2025   Medical Record #: [id number]

## (undated) NOTE — LETTER
January 3, 2019         Kaila Headings, [facility]      Patient: Bridget Dean   YOB: 1980   Date of Visit: 1/3/2019       Dear Dr. Faith Marroquin MD,    I saw your patient, Bridget Dean, on 1/3/2019.  Enclosed i

## (undated) NOTE — LETTER
MINOR CASE LETTER      6/18/2021        Dear Jayro Saleh are having a operative laparoscopy, right salpingectomy, left salpingectomy on Wednesday (6/23/21) at [address] [facility] would need to arrive 2 hours prior to surgery which is 7:00am.    Do not eat or drink anything (including water) after midnight the night before surgery. If your procedure is scheduled later in the day, then nothing by mouth for 6 hours before arrival to the hospital.    Fransisco Ramos are to call this office if you have any cold or flu symptoms 2 days before your scheduled surgery. Please avoid aspirin 7 days before surgery. Avoid Ibuprofen, Motrin, Aleve, or Naprosyn for 3 days before surgery. You will be contacted by PreAdmission Testing (PAT) usually within the week before surgery. They will take a short medical history and let you know if any preoperative testing is needed. They will be entering your COVID-19 test to be done 72 hours prior to surgery. Contact your insurance company to let them know you are having a [id number] done. Call our office if any pre-certification or pre-authorization is required. If you have an HMO or EPO insurance, we will initiate the referral for you. Please make arrangements for someone to drive you home after your surgery. Please feel free to contact our office at  if you have any questions regarding these instructions or your procedure. Sincerely,          Lala Mixon.  Delores Funk MD  [facility], [address] [place] [address]   [id number]  [facility]  [address] [facility] [address] [phone]

## (undated) NOTE — MR AVS SNAPSHOT
[facility] SPECIALTY [facility] [place] [address] [address] [id number] [phone]               Thank you for choosing us for your health care visit with Bev Florez MD.  We are glad to serve you and happy to provide you with this summary of y Vitamin D3 3000 units Tabs   Take 3,000 Units by mouth daily.                 Where to Get Your Medications      These medications were sent to [place] [address] [address], [phone], [phone]

## (undated) NOTE — LETTER
Date & Time: 8/5/2020, 11:09 PM  Patient: Jadyn Zaman  Encounter Provider(s):    Indu Carter MD       To Whom It May Concern:    Jadyn Zaman was seen and treated in our department on 8/5/2020. She can return to work on Saturday, August 8 .     I

## (undated) NOTE — MR AVS SNAPSHOT
[address], [address]  [address] [facility]  [phone]               Thank you for choosing us for your health care visit with Sadei Moya.  MD Eliel.  We are glad to serve you and happy to provide you with this summary Take 1 tablet by mouth every 4 (four) hours as needed. Commonly known as:  NORCO           MULTIVITAMIN OR   Take  by mouth. VITAMIN C OR   Take  by mouth. Vitamin D3 3000 UNITS Tabs   Take 3,000 Units by mouth daily. clinic staff will provide you with the phone number you should call to schedule your appointment.      If you are confident that your benefit plan will not require a referral or authorization, such as [place], please feel free to schedule your [place] information, go to [email or web address] and click on the Sign Up Now link in the [employer] box. Enter your haku Activation Code exactly as it appears below along with your Zip Code and Date of Birth to complete the sign-up process.  If you do